# Patient Record
Sex: FEMALE | Race: WHITE | Employment: OTHER | URBAN - METROPOLITAN AREA
[De-identification: names, ages, dates, MRNs, and addresses within clinical notes are randomized per-mention and may not be internally consistent; named-entity substitution may affect disease eponyms.]

---

## 2019-06-05 ENCOUNTER — LAB VISIT (OUTPATIENT)
Dept: LAB | Facility: HOSPITAL | Age: 29
End: 2019-06-05
Attending: OBSTETRICS & GYNECOLOGY
Payer: MEDICARE

## 2019-06-05 ENCOUNTER — OFFICE VISIT (OUTPATIENT)
Dept: OBSTETRICS AND GYNECOLOGY | Facility: CLINIC | Age: 29
End: 2019-06-05
Payer: MEDICARE

## 2019-06-05 VITALS — HEIGHT: 63 IN | WEIGHT: 175.06 LBS | BODY MASS INDEX: 31.02 KG/M2

## 2019-06-05 DIAGNOSIS — N91.4 SECONDARY OLIGOMENORRHEA: Primary | ICD-10-CM

## 2019-06-05 DIAGNOSIS — N91.4 SECONDARY OLIGOMENORRHEA: ICD-10-CM

## 2019-06-05 DIAGNOSIS — N89.8 VAGINAL DISCHARGE: ICD-10-CM

## 2019-06-05 LAB
B-HCG UR QL: NEGATIVE
CTP QC/QA: YES
TSH SERPL DL<=0.005 MIU/L-ACNC: 1.94 UIU/ML (ref 0.4–4)

## 2019-06-05 PROCEDURE — 84146 ASSAY OF PROLACTIN: CPT

## 2019-06-05 PROCEDURE — 88175 CYTOPATH C/V AUTO FLUID REDO: CPT

## 2019-06-05 PROCEDURE — 36415 COLL VENOUS BLD VENIPUNCTURE: CPT

## 2019-06-05 PROCEDURE — 84443 ASSAY THYROID STIM HORMONE: CPT

## 2019-06-05 PROCEDURE — 83001 ASSAY OF GONADOTROPIN (FSH): CPT

## 2019-06-05 PROCEDURE — 99213 OFFICE O/P EST LOW 20 MIN: CPT | Mod: PBBFAC | Performed by: OBSTETRICS & GYNECOLOGY

## 2019-06-05 PROCEDURE — 87624 HPV HI-RISK TYP POOLED RSLT: CPT

## 2019-06-05 PROCEDURE — 99205 OFFICE O/P NEW HI 60 MIN: CPT | Mod: S$PBB,,, | Performed by: OBSTETRICS & GYNECOLOGY

## 2019-06-05 PROCEDURE — 99999 PR PBB SHADOW E&M-EST. PATIENT-LVL III: CPT | Mod: PBBFAC,,, | Performed by: OBSTETRICS & GYNECOLOGY

## 2019-06-05 PROCEDURE — 99999 PR PBB SHADOW E&M-EST. PATIENT-LVL III: ICD-10-PCS | Mod: PBBFAC,,, | Performed by: OBSTETRICS & GYNECOLOGY

## 2019-06-05 PROCEDURE — 99205 PR OFFICE/OUTPT VISIT, NEW, LEVL V, 60-74 MIN: ICD-10-PCS | Mod: S$PBB,,, | Performed by: OBSTETRICS & GYNECOLOGY

## 2019-06-05 PROCEDURE — 87660 TRICHOMONAS VAGIN DIR PROBE: CPT

## 2019-06-05 PROCEDURE — 81025 URINE PREGNANCY TEST: CPT | Mod: PBBFAC | Performed by: OBSTETRICS & GYNECOLOGY

## 2019-06-05 RX ORDER — TOPIRAMATE 200 MG/1
200 TABLET ORAL NIGHTLY
Refills: 5 | COMMUNITY
Start: 2019-03-23 | End: 2022-07-11 | Stop reason: SDUPTHER

## 2019-06-05 RX ORDER — OMEPRAZOLE 40 MG/1
40 CAPSULE, DELAYED RELEASE ORAL DAILY
Refills: 5 | COMMUNITY
Start: 2019-03-09 | End: 2021-01-21

## 2019-06-05 RX ORDER — ARIPIPRAZOLE 5 MG/1
5 TABLET ORAL
COMMUNITY
End: 2021-01-21

## 2019-06-05 RX ORDER — BUPROPION HYDROCHLORIDE 150 MG/1
TABLET, EXTENDED RELEASE ORAL
Refills: 0 | COMMUNITY
Start: 2019-03-25

## 2019-06-05 NOTE — PROGRESS NOTES
"History & Physical  Gynecology      SUBJECTIVE:     Chief Complaint: Amenorrhea       History of Present Illness:  Ms. Akins is a 28 y/o female who presents to clinic to discuss menstrual ireegularirty. She reports that she has not had a period since February.    Periods lasts 4-5 days and are monthly but irregularly. She reports that her periods were very all over the place. She reports that although her periods were monthly they were very all over the place. She could not predict when her periods were going to come. She reports that her cycles were every 28 days up until she was started on an OCP last year which she stopped because she ran out mid . She reports that she ran out because she could not see the gynecologist as he was in [facility]. The OCPs allowed her to have a cycle every 3 months and the flow was not as heavy.       Since then her period has been irregular. In February, her period stopped. She reports that she did not start a new medication. 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 No pertinent surgical history.  OB History        0    Para   0    Term   0       0    AB   0    Living   0       SAB   0    TAB   0    Ectopic   0    Multiple   0    Live Births   0               Family History   Problem Relation Age of Onset    Mental illness Mother      Social History     Tobacco Use    Smoking status: Never Smoker   Substance Use Topics    Alcohol use: Yes     Frequency: Monthly or less     Comment: wine every now and then     Drug use: No       Current Outpatient Medications   Medication Sig    " ARIPiprazole (ABILIFY) 5 MG Tab Take 5 mg by mouth.    buPROPion (WELLBUTRIN SR) 150 MG TBSR 12 hr tablet TAKE 2 TABLETS BY MOUTH EVERY MORNING AND TAKE 1 TABLET EVERY EVENING    omeprazole (PRILOSEC) 40 MG capsule Take 40 mg by mouth once daily.    oxcarbazepine (TRILEPTAL) 300 MG Tab Take 150 mg by mouth 2 (two) times daily.    topiramate (TOPAMAX) 200 MG Tab Take 200 mg by mouth every evening.    venlafaxine (EFFEXOR-XR) 75 MG 24 hr capsule Take 75 mg by mouth once daily.     No current facility-administered medications for this visit.      Review of Systems:  Review of Systems   Constitutional: Negative for chills and fever.   Respiratory: Negative for cough and wheezing.    Cardiovascular: Negative for chest pain and palpitations.   Gastrointestinal: Negative for abdominal pain, nausea and vomiting.   Genitourinary: Positive for menstrual problem. Negative for dysuria, frequency, hematuria, pelvic pain, vaginal bleeding, vaginal discharge and vaginal pain.   Psychiatric/Behavioral: The patient is nervous/anxious.         OBJECTIVE:     Physical Exam:  Physical Exam   Constitutional: She is oriented to person, place, and time. She appears well-developed and well-nourished. No distress.   Cardiovascular: Normal rate.   Pulmonary/Chest: Effort normal.   Genitourinary: Vaginal discharge found.   Genitourinary Comments: Unable to tolerate bimanual exam, thick white vaginal discharge in vault   Neurological: She is alert and oriented to person, place, and time.   Skin: Skin is warm and dry.   Nursing note and vitals reviewed.      ASSESSMENT:       ICD-10-CM ICD-9-CM    1. Secondary oligomenorrhea N91.4 626.1 TSH      POCT urine pregnancy      Prolactin      Follicle stimulating hormone      HPV High Risk Genotypes, PCR      Liquid-based pap smear, screening   2. Vaginal discharge N89.8 623.5 Vaginosis Screen by DNA Probe        Plan:      Patsy Camargo was seen today for amenorrhea.    Diagnoses and all orders for  this visit:    Secondary oligomenorrhea  -     TSH; Future  -     POCT urine pregnancy  -     Prolactin; Future  -     Follicle stimulating hormone; Future  -     HPV High Risk Genotypes, PCR  -     Liquid-based pap smear, screening  - Discussed etiologies of menstrual irregularities. Medications, weight changes, and stress could be the cause of recent cycle changes. Discussed with patient of restarting OCPs to regulate periods if labs are normal.     Vaginal discharge  -     Vaginosis Screen by DNA Probe        Orders Placed This Encounter   Procedures    Vaginosis Screen by DNA Probe    HPV High Risk Genotypes, PCR    TSH    Prolactin    Follicle stimulating hormone    POCT urine pregnancy       Follow up in about 1 month (around 7/3/2019) for WWE.    Counseling time: 30 minutes    Olaf Mckeon

## 2019-06-05 NOTE — PATIENT INSTRUCTIONS
Understanding the Normal Menstrual Cycle  Having a period (menstruation) is a normal, healthy part of being a woman. Its also part of the menstrual cycle, a process that makes it possible for women to become pregnant. The first day of your period is the first day of your menstrual cycle.   A woman who has irregular cycles can become pregnant during bleeding. This may not be a true menstrual period.   An egg is released    Eggs are female reproductive cells stored in the ovaries. During each cycle, a woman's hormones trigger an egg, (usually 1), to mature and be released from an ovary. This is called ovulation. The egg then travels from the ovary to a fallopian tube.  The egg travels through a tube  The egg moves through the fallopian tube toward the uterus. If sperm are present in the tube, the egg may be fertilized, and pregnancy could result.  The uterine lining grows thicker  The lining of the uterus is made up of blood, tissue, and fluid. During each cycle, hormones cause the lining to thicken. This helps prepare the uterus to receive and nourish a fertilized egg.   The egg and lining are shed  If pregnancy doesnt happen, the egg and thickened lining of the uterus are no longer needed. They are then shed through the vagina. This is called a menstrual period.  How long is each cycle?  It is normal for a cycle to take 21 to 34 days. For teenagers, the time between periods might be as much as 45 days. For adults, it will be around a month from the first day of one period to the first day of the next. Thats why you may hear women talk about a monthly cycle, even though cycle length can vary from one month to another, and anywhere from 3 to 5 weeks is normal. Not everyone has a 28-day cycle.    How long does a period last?  Its normal for a period to last 2 to 7 days. Talk to your healthcare provider if your period lasts longer than 7 days for 2 cycles in a row.  Date Last Reviewed: 12/1/2016  © 2260-5174 [employer]. [address] [place] [address], [place] [address]. All rights reserved. This information is not intended as a substitute for professional medical care. Always follow your healthcare professional's instructions.

## 2019-06-06 DIAGNOSIS — B37.31 YEAST VAGINITIS: Primary | ICD-10-CM

## 2019-06-06 LAB
BACTERIAL VAGINOSIS DNA: NEGATIVE
CANDIDA GLABRATA DNA: NEGATIVE
CANDIDA KRUSEI DNA: NEGATIVE
CANDIDA RRNA VAG QL PROBE: POSITIVE
FSH SERPL-ACNC: 5.2 MIU/ML
PROLACTIN SERPL IA-MCNC: 17.6 NG/ML (ref 5.2–26.5)
T VAGINALIS RRNA GENITAL QL PROBE: NEGATIVE

## 2019-06-06 RX ORDER — FLUCONAZOLE 150 MG/1
150 TABLET ORAL DAILY
Qty: 1 TABLET | Refills: 0 | Status: SHIPPED | OUTPATIENT
Start: 2019-06-06 | End: 2019-06-07

## 2019-06-07 ENCOUNTER — TELEPHONE (OUTPATIENT)
Dept: OBSTETRICS AND GYNECOLOGY | Facility: CLINIC | Age: 29
End: 2019-06-07

## 2019-06-07 NOTE — TELEPHONE ENCOUNTER
Patient informed.  Wants the birth control.  Message forwarded.     ----- Message from Olaf Mckeon MD sent at 6/6/2019  5:06 PM CDT -----  Please inform patient that all of her blood work is normal so if she would like to start birth control to regulate her period just please let me know and I will send to her pharmacy.    Dr. Marshall

## 2019-06-07 NOTE — TELEPHONE ENCOUNTER
----- Message from Olaf Mckeon MD sent at 6/6/2019  5:06 PM CDT -----  Please inform patient that all of her blood work is normal so if she would like to start birth control to regulate her period just please let me know and I will send to her pharmacy.    Dr. Marshall

## 2019-06-07 NOTE — TELEPHONE ENCOUNTER
Call attempt to pt unsuccessful regarding normal test reults. LM for pt to return call to office.

## 2019-06-11 LAB
HPV HR 12 DNA CVX QL NAA+PROBE: NEGATIVE
HPV16 AG SPEC QL: NEGATIVE
HPV18 DNA SPEC QL NAA+PROBE: NEGATIVE

## 2019-06-12 DIAGNOSIS — N92.6 MENSTRUAL IRREGULARITY: Primary | ICD-10-CM

## 2019-06-13 ENCOUNTER — TELEPHONE (OUTPATIENT)
Dept: OBSTETRICS AND GYNECOLOGY | Facility: CLINIC | Age: 29
End: 2019-06-13

## 2019-06-13 RX ORDER — NORETHINDRONE ACETATE AND ETHINYL ESTRADIOL .02; 1 MG/1; MG/1
1 TABLET ORAL DAILY
Qty: 90 TABLET | Refills: 3 | Status: SHIPPED | OUTPATIENT
Start: 2019-06-13 | End: 2021-01-21

## 2019-06-13 NOTE — TELEPHONE ENCOUNTER
----- Message from Olaf Mckeon MD sent at 6/13/2019 12:07 AM CDT -----  Please inform patient that ocps have been sent.

## 2019-06-21 ENCOUNTER — TELEPHONE (OUTPATIENT)
Dept: OBSTETRICS AND GYNECOLOGY | Facility: CLINIC | Age: 29
End: 2019-06-21

## 2019-06-21 NOTE — TELEPHONE ENCOUNTER
Called pt per  request wanted to know if pt picked up RX for B/c and if it is helping with the bleeding Lm asking pt to call office

## 2019-08-05 ENCOUNTER — TELEPHONE (OUTPATIENT)
Dept: PSYCHIATRY | Facility: CLINIC | Age: 29
End: 2019-08-05

## 2019-08-05 NOTE — TELEPHONE ENCOUNTER
Patient called to schedule new patient appointment which we are not able to do at this time as she does not have [facility] or establised with any [facility] providers.

## 2019-11-15 ENCOUNTER — HOSPITAL ENCOUNTER (EMERGENCY)
Facility: OTHER | Age: 29
Discharge: PSYCHIATRIC OR SUBSTANCE USE FACILITY | End: 2019-11-15
Attending: EMERGENCY MEDICINE
Payer: MEDICARE

## 2019-11-15 VITALS
WEIGHT: 160 LBS | RESPIRATION RATE: 14 BRPM | BODY MASS INDEX: 28.35 KG/M2 | OXYGEN SATURATION: 99 % | HEIGHT: 63 IN | DIASTOLIC BLOOD PRESSURE: 87 MMHG | TEMPERATURE: 99 F | SYSTOLIC BLOOD PRESSURE: 119 MMHG | HEART RATE: 109 BPM

## 2019-11-15 DIAGNOSIS — R45.851 SUICIDAL IDEATIONS: Primary | ICD-10-CM

## 2019-11-15 DIAGNOSIS — R00.0 TACHYCARDIA: ICD-10-CM

## 2019-11-15 DIAGNOSIS — F31.9 BIPOLAR AFFECTIVE DISORDER, REMISSION STATUS UNSPECIFIED: ICD-10-CM

## 2019-11-15 LAB
ALBUMIN SERPL BCP-MCNC: 3.9 G/DL (ref 3.5–5.2)
ALP SERPL-CCNC: 59 U/L (ref 55–135)
ALT SERPL W/O P-5'-P-CCNC: 34 U/L (ref 10–44)
AMPHET+METHAMPHET UR QL: NEGATIVE
ANION GAP SERPL CALC-SCNC: 13 MMOL/L (ref 8–16)
APAP SERPL-MCNC: <3 UG/ML (ref 10–20)
AST SERPL-CCNC: 20 U/L (ref 10–40)
B-HCG UR QL: NEGATIVE
BARBITURATES UR QL SCN>200 NG/ML: NEGATIVE
BASOPHILS # BLD AUTO: 0.04 K/UL (ref 0–0.2)
BASOPHILS NFR BLD: 0.7 % (ref 0–1.9)
BENZODIAZ UR QL SCN>200 NG/ML: NEGATIVE
BILIRUB SERPL-MCNC: 0.2 MG/DL (ref 0.1–1)
BILIRUB UR QL STRIP: NEGATIVE
BUN SERPL-MCNC: 6 MG/DL (ref 6–20)
BZE UR QL SCN: NEGATIVE
CALCIUM SERPL-MCNC: 9.2 MG/DL (ref 8.7–10.5)
CANNABINOIDS UR QL SCN: NEGATIVE
CHLORIDE SERPL-SCNC: 107 MMOL/L (ref 95–110)
CLARITY UR: ABNORMAL
CO2 SERPL-SCNC: 17 MMOL/L (ref 23–29)
COLOR UR: YELLOW
CREAT SERPL-MCNC: 0.9 MG/DL (ref 0.5–1.4)
CREAT UR-MCNC: 124.8 MG/DL (ref 15–325)
CTP QC/QA: YES
DIFFERENTIAL METHOD: ABNORMAL
EOSINOPHIL # BLD AUTO: 0.1 K/UL (ref 0–0.5)
EOSINOPHIL NFR BLD: 1.8 % (ref 0–8)
ERYTHROCYTE [DISTWIDTH] IN BLOOD BY AUTOMATED COUNT: 15.9 % (ref 11.5–14.5)
EST. GFR  (AFRICAN AMERICAN): >60 ML/MIN/1.73 M^2
EST. GFR  (NON AFRICAN AMERICAN): >60 ML/MIN/1.73 M^2
ETHANOL SERPL-MCNC: <10 MG/DL
GLUCOSE SERPL-MCNC: 91 MG/DL (ref 70–110)
GLUCOSE UR QL STRIP: NEGATIVE
HCT VFR BLD AUTO: 39.3 % (ref 37–48.5)
HGB BLD-MCNC: 12.8 G/DL (ref 12–16)
HGB UR QL STRIP: NEGATIVE
IMM GRANULOCYTES # BLD AUTO: 0.02 K/UL (ref 0–0.04)
IMM GRANULOCYTES NFR BLD AUTO: 0.4 % (ref 0–0.5)
KETONES UR QL STRIP: NEGATIVE
LEUKOCYTE ESTERASE UR QL STRIP: NEGATIVE
LYMPHOCYTES # BLD AUTO: 1.3 K/UL (ref 1–4.8)
LYMPHOCYTES NFR BLD: 23.7 % (ref 18–48)
MCH RBC QN AUTO: 27.4 PG (ref 27–31)
MCHC RBC AUTO-ENTMCNC: 32.6 G/DL (ref 32–36)
MCV RBC AUTO: 84 FL (ref 82–98)
METHADONE UR QL SCN>300 NG/ML: NEGATIVE
MONOCYTES # BLD AUTO: 0.6 K/UL (ref 0.3–1)
MONOCYTES NFR BLD: 11.1 % (ref 4–15)
NEUTROPHILS # BLD AUTO: 3.5 K/UL (ref 1.8–7.7)
NEUTROPHILS NFR BLD: 62.3 % (ref 38–73)
NITRITE UR QL STRIP: NEGATIVE
NRBC BLD-RTO: 0 /100 WBC
OPIATES UR QL SCN: NEGATIVE
PCP UR QL SCN>25 NG/ML: NEGATIVE
PH UR STRIP: 8 [PH] (ref 5–8)
PLATELET # BLD AUTO: 249 K/UL (ref 150–350)
PMV BLD AUTO: 8.1 FL (ref 9.2–12.9)
POTASSIUM SERPL-SCNC: 3.3 MMOL/L (ref 3.5–5.1)
PROT SERPL-MCNC: 7.1 G/DL (ref 6–8.4)
PROT UR QL STRIP: NEGATIVE
RBC # BLD AUTO: 4.68 M/UL (ref 4–5.4)
SODIUM SERPL-SCNC: 137 MMOL/L (ref 136–145)
SP GR UR STRIP: 1.01 (ref 1–1.03)
TOXICOLOGY INFORMATION: NORMAL
TSH SERPL DL<=0.005 MIU/L-ACNC: 1.66 UIU/ML (ref 0.4–4)
URN SPEC COLLECT METH UR: ABNORMAL
UROBILINOGEN UR STRIP-ACNC: 1 EU/DL
WBC # BLD AUTO: 5.61 K/UL (ref 3.9–12.7)

## 2019-11-15 PROCEDURE — 80329 ANALGESICS NON-OPIOID 1 OR 2: CPT

## 2019-11-15 PROCEDURE — 85025 COMPLETE CBC W/AUTO DIFF WBC: CPT

## 2019-11-15 PROCEDURE — 93005 ELECTROCARDIOGRAM TRACING: CPT

## 2019-11-15 PROCEDURE — 84443 ASSAY THYROID STIM HORMONE: CPT

## 2019-11-15 PROCEDURE — 80307 DRUG TEST PRSMV CHEM ANLYZR: CPT

## 2019-11-15 PROCEDURE — 93010 ELECTROCARDIOGRAM REPORT: CPT | Mod: ,,, | Performed by: INTERNAL MEDICINE

## 2019-11-15 PROCEDURE — 93010 EKG 12-LEAD: ICD-10-PCS | Mod: ,,, | Performed by: INTERNAL MEDICINE

## 2019-11-15 PROCEDURE — 81003 URINALYSIS AUTO W/O SCOPE: CPT | Mod: 59

## 2019-11-15 PROCEDURE — 80053 COMPREHEN METABOLIC PANEL: CPT

## 2019-11-15 PROCEDURE — 99285 EMERGENCY DEPT VISIT HI MDM: CPT | Mod: 25

## 2019-11-15 PROCEDURE — 81025 URINE PREGNANCY TEST: CPT | Performed by: EMERGENCY MEDICINE

## 2019-11-15 PROCEDURE — 80320 DRUG SCREEN QUANTALCOHOLS: CPT

## 2019-11-15 PROCEDURE — 25000003 PHARM REV CODE 250: Performed by: EMERGENCY MEDICINE

## 2019-11-15 RX ORDER — LORAZEPAM 1 MG/1
1 TABLET ORAL
Status: COMPLETED | OUTPATIENT
Start: 2019-11-15 | End: 2019-11-15

## 2019-11-15 RX ORDER — PANTOPRAZOLE SODIUM 40 MG/1
40 TABLET, DELAYED RELEASE ORAL
Status: COMPLETED | OUTPATIENT
Start: 2019-11-15 | End: 2019-11-15

## 2019-11-15 RX ORDER — BUPROPION HYDROCHLORIDE 150 MG/1
150 TABLET, EXTENDED RELEASE ORAL
Status: COMPLETED | OUTPATIENT
Start: 2019-11-15 | End: 2019-11-15

## 2019-11-15 RX ADMIN — PANTOPRAZOLE SODIUM 40 MG: 40 TABLET, DELAYED RELEASE ORAL at 02:11

## 2019-11-15 RX ADMIN — LORAZEPAM 1 MG: 1 TABLET ORAL at 12:11

## 2019-11-15 RX ADMIN — BUPROPION HYDROCHLORIDE 150 MG: 150 TABLET, EXTENDED RELEASE ORAL at 03:11

## 2019-11-15 NOTE — ED NOTES
Pt remains in policy paper scrubs, resting in stretcher comfortably. Pt remains calm and cooperative with staff, aaox4, speaking approprietly in clear full sentences at this time. Respirations remain spontaneous, even and non labored. Toileting needs addressed at this time, pt states will call nurse for assistance. Patient denies pains or needs at this time. No signs of distress noted at this time. NELSON Yael remains at bedside in direct visual contact, charting per protocol every 15 minutes. No equipment or belongings are in the patients room. Pt aware of plan of current care. Will continue to monitor closely.

## 2019-11-15 NOTE — ED NOTES
Medications:  [id number] (20tablets)  Nasal spray 50mcg  Omeprazole 40mg (1)  latuda 20mg (30)  topiramate 200mg (54)  Oxcarbazepine 600mg(75)  Oxcarbazepine 600mg(5)  latuda 20mg(2)  Oxcarbazepine 600mg (98)  Estradiol tablets(1)  Estradiol(9)  Bupropion HCL(45)  Lorazepam(113)    Pharmacy ticket number [id number]

## 2019-11-15 NOTE — ED PROVIDER NOTES
"Encounter Date: 11/15/2019    SCRIBE #1 NOTE: I, Elizabeth Vangmayehu, am scribing for, and in the presence of, Dr. Qureshi.       History     Chief Complaint   Patient presents with    Suicidal     Pt reports being suicidal and thinking about different ways to do it such as slitting my wrist or driving my car off of a bridge. Pt denies AH or VH, denies HI     Time seen by provider: 11:44 AM    This is a 29 y.o. female with hx of bipolar disorder with depression who presents due to SI today. She states that she has been thinking of hurting herself for "a while now." She reports that she has been feeling depressed, states that she hasn't wanted to eat and is sleeping more. She states that she is on medications for depression and bipolar disorder, but "they're not helping." Pt reports taking her medications this morning. Pt does see a psychiatrist who she states suggested she go to a hospital. She is requesting to leave now, stating "it's my body, it's my choice."     The history is provided by the patient.     Review of patient's allergies indicates:   Allergen Reactions    Lamictal [lamotrigine]      Past Medical History:   Diagnosis Date    Bipolar affective     Bipolar disorder with depression     Anxiety disorder    Irritable bowel syndrome      No past surgical history on file.  Family History   Problem Relation Age of Onset    Mental illness Mother      Social History     Tobacco Use    Smoking status: Never Smoker   Substance Use Topics    Alcohol use: Yes     Frequency: Monthly or less     Comment: wine every now and then     Drug use: No     Review of Systems   Constitutional: Negative for chills and fever.   HENT: Negative for congestion, rhinorrhea and sore throat.    Respiratory: Negative for cough and shortness of breath.    Cardiovascular: Negative for chest pain.   Gastrointestinal: Negative for abdominal pain, diarrhea, nausea and vomiting.   Endocrine: Negative for polyuria.   Genitourinary: " Negative for decreased urine volume and dysuria.   Musculoskeletal: Negative for back pain.   Skin: Negative for rash.   Allergic/Immunologic: Negative for immunocompromised state.   Neurological: Negative for dizziness and weakness.   Hematological: Does not bruise/bleed easily.   Psychiatric/Behavioral: Positive for suicidal ideas. Negative for confusion and hallucinations.        Positive for feeling depressed.       Physical Exam     Initial Vitals [11/15/19 1114]   BP Pulse Resp Temp SpO2   128/87 (!) 136 18 98.8 °F (37.1 °C) 99 %      MAP       --         Physical Exam    Nursing note and vitals reviewed.  Constitutional: She appears well-developed and well-nourished. No distress.   Tearful.   HENT:   Head: Normocephalic and atraumatic.   Right Ear: External ear normal.   Left Ear: External ear normal.   Eyes: EOM are normal. Pupils are equal, round, and reactive to light. Right eye exhibits no discharge. Left eye exhibits no discharge.   Neck: Normal range of motion. Neck supple.   Cardiovascular: Regular rhythm, normal heart sounds and intact distal pulses. Tachycardia present.    Pulmonary/Chest: Breath sounds normal. No respiratory distress.   Abdominal: Soft. Bowel sounds are normal. She exhibits no distension. There is no tenderness.   Musculoskeletal: Normal range of motion. She exhibits no edema.   Neurological: She is alert and oriented to person, place, and time. She has normal strength. No cranial nerve deficit.   Skin: Skin is warm and dry. No rash noted. No erythema.         ED Course   Procedures  Labs Reviewed   CBC W/ AUTO DIFFERENTIAL - Abnormal; Notable for the following components:       Result Value    RDW 15.9 (*)     MPV 8.1 (*)     All other components within normal limits   COMPREHENSIVE METABOLIC PANEL - Abnormal; Notable for the following components:    Potassium 3.3 (*)     CO2 17 (*)     All other components within normal limits   URINALYSIS, REFLEX TO URINE CULTURE - Abnormal;  Notable for the following components:    Appearance, UA Hazy (*)     All other components within normal limits    Narrative:     Preferred Collection Type->Urine, Clean Catch   ACETAMINOPHEN LEVEL - Abnormal; Notable for the following components:    Acetaminophen (Tylenol), Serum <3.0 (*)     All other components within normal limits   TSH   DRUG SCREEN PANEL, URINE EMERGENCY    Narrative:     Preferred Collection Type->Urine, Clean Catch   ALCOHOL,MEDICAL (ETHANOL)   POCT URINE PREGNANCY     EKG Readings: (Independently Interpreted)   Rhythm: Sinus Tachycardia. Heart Rate: 113.   No arrhythmia. No acute ischemia.         Medical Decision Making:   History:   Old Medical Records: I decided to obtain old medical records.  Initial Assessment:       29-year-old female history of bipolar, depression presents with SI.  Prior to my evaluation, patient became tearful and agitated and requested to leave and required security to direct her back to her room.  I was able to calm her down and she provided limited history, stating that she has had worsening depression despite compliance with her medications, and SI.  She has required psychiatric admission in the past but not in years.  She does follow with a psychiatrist who directed her to the ER for admission.  Patient denies any acute physical complaints, and does not appear intoxicated.  She is now calm and cooperative; she takes Ativan p.r.n. Anxiety, so will give 1 dose now.    Basic labs checked with no acute findings except for mild hypokalemia; no elevated alcohol and no drugs on Utox.   Patient remained cooperative ED with no further episodes of agitation.   She is medically clear for psychiatric evaluation and admission.       Independently Interpreted Test(s):   I have ordered and independently interpreted EKG Reading(s) - see prior notes  Clinical Tests:   Lab Tests: Ordered and Reviewed  Medical Tests: Ordered and Reviewed            Scribe Attestation:   Scribe  #1: I performed the above scribed service and the documentation accurately describes the services I performed. I attest to the accuracy of the note.    Attending Attestation:           Physician Attestation for Scribe:  Physician Attestation Statement for Scribe #1: I, Dr. Quershi, reviewed documentation, as scribed by Elizabeth Tony in my presence, and it is both accurate and complete.                               Clinical Impression:     1. Suicidal ideations    2. Tachycardia    3. Bipolar affective disorder, remission status unspecified                              Ramone Qureshi MD  11/15/19 1649

## 2019-11-15 NOTE — ED NOTES
Pt given ice water and new pair of paper scrub pants. Tom (Risk Sitter) at bedside maintaining direct observation.

## 2019-11-15 NOTE — ED NOTES
Pt explained transfer process. Pt v/u     Pt signed transfer form and pt rights form. Copy given to pt

## 2019-11-15 NOTE — ED NOTES
Pt moved to ED bed 9. All harmful objects removed from room. Pt undressed and changed into paper scrubs per unit policy. All pt belongings and valuables brought to nursing station and given to security. ED sitter Yael at bedside for direct observation. PEC precautions maintained. Will continue to monitor.

## 2019-11-15 NOTE — ED NOTES
Pt escorted to room 2 with triage nurse. ED sitter chris at bedside for direct observation until further evaluation by provider. PEC precautions initiated until further evaluation by provider

## 2019-11-15 NOTE — ED NOTES
"Two patient Identifiers for Patsy Akins checked and correct    Pt presents to the ED for suicidal thoughts. Pt states she keeps having repetative thoughts of need to escape her life and ways to kill herself such as "slit her wrists" or "jump in front of a car". Pt denies previous attempts, intention to act on thoughts or working out the details of a suicide attempt. Pt admits to previous self harm. Pt states her home life such as dying grandmother, unsupportive aunt, and dog barking at her is triggering her stress. Pt states feeling trapped. Pt states she is seeing psych healthcare practitioner regularly, and states she is taking her meds as ordered. Pt agrees to treatment and states familiar the process.     LOC/Affect: Pt A&Ox4. Pt affect is anxious and tearful.   Appearance: Pt in no acute distress at present time. Pt is clean and well groomed.  Skin: Skin warm, dry & intact. Color consistent with ethnicity. Mucous membranes moist. No breakdown or brusing noted.   Musculoskeletal: Patient ROM intact MAEW, no obvious swelling or deformities noted.   Respiratory: Respirations spontaneous, even, and non-labored. Visible chest rise noted. Airway is open and patent. No accessory muscle use noted. Breath sounds CTA  Cardiac: All peripheral pulses present. No Bilateral lower extremity edema. Cap refill is <3 seconds. ST on cardiac monitoring.  Neurologic: Pupils 3mm PERRL. Motor and sensation intact. Speech is clear and appropriate. Eyes open spontaneously, follows commands, facial expression symmetrical.  Abdomen: Abdomen soft, non-tender to palpation. No distention noted. ABS all quads.   : Pt reports no dysuria or hematuria.     Will continue to monitor  "

## 2019-11-15 NOTE — ED NOTES
Pt became very upset, stating she wants to leave. Pt began yelling and crying, then attempted to walk out the room. Dr. Reynolds at bedside along with risk sitter, RNx2, and security outside doorway. Dr. Reynolds able to calm patient and redirect her back into stretcher. Pt continues to cry, but cooperative with MD and agreeing with POC. Urine collected.

## 2019-11-15 NOTE — ED NOTES
Belongings:  Stuffed animal  Several notebooks  Pencil pouch    Coconut oil  Hairbrush  Makeup pouch  Sweater  Earphones  Black purse  Scissors  Sunglasses  Fidget Clicker  Ear plugs  Bra  Pants   shoes    Valuables:  Wallet: ID, debit cards, gift cards,library card.  Keys  Phone    Valuable ticket number [id number]

## 2019-11-15 NOTE — ED NOTES
Pt lying on stretcher calm but slightly tearful. Cooperative with blood draw. Requesting Ativan and states she will try to eat something. MD notified and new medication order received. terry Conley, remains at bedside maintaining direct visual observation.

## 2019-11-16 PROBLEM — I10 ESSENTIAL HYPERTENSION: Status: ACTIVE | Noted: 2019-11-16

## 2019-11-16 PROBLEM — E87.6 HYPOKALEMIA: Status: ACTIVE | Noted: 2019-11-16

## 2019-11-16 PROBLEM — K21.9 GASTROESOPHAGEAL REFLUX DISEASE WITHOUT ESOPHAGITIS: Status: ACTIVE | Noted: 2019-11-16

## 2019-11-16 PROBLEM — K58.8 OTHER IRRITABLE BOWEL SYNDROME: Status: ACTIVE | Noted: 2019-11-16

## 2020-07-01 ENCOUNTER — HOSPITAL ENCOUNTER (EMERGENCY)
Facility: HOSPITAL | Age: 30
Discharge: HOME | End: 2020-07-01
Attending: EMERGENCY MEDICINE
Payer: MEDICARE

## 2020-07-01 VITALS
RESPIRATION RATE: 18 BRPM | HEIGHT: 63 IN | TEMPERATURE: 99 F | OXYGEN SATURATION: 98 % | WEIGHT: 125 LBS | DIASTOLIC BLOOD PRESSURE: 70 MMHG | BODY MASS INDEX: 22.15 KG/M2 | SYSTOLIC BLOOD PRESSURE: 110 MMHG | HEART RATE: 120 BPM

## 2020-07-01 DIAGNOSIS — F41.9 ANXIETY: Primary | ICD-10-CM

## 2020-07-01 DIAGNOSIS — F43.10 POST TRAUMATIC STRESS DISORDER (PTSD): ICD-10-CM

## 2020-07-01 DIAGNOSIS — F31.9 BIPOLAR 1 DISORDER: ICD-10-CM

## 2020-07-01 PROCEDURE — 25000003 PHARM REV CODE 250: Performed by: EMERGENCY MEDICINE

## 2020-07-01 PROCEDURE — 99283 EMERGENCY DEPT VISIT LOW MDM: CPT

## 2020-07-01 RX ORDER — VORTIOXETINE 10 MG/1
10 TABLET, FILM COATED ORAL
COMMUNITY

## 2020-07-01 RX ORDER — LORAZEPAM 0.5 MG/1
0.5 TABLET ORAL
Status: COMPLETED | OUTPATIENT
Start: 2020-07-01 | End: 2020-07-01

## 2020-07-01 RX ORDER — LORAZEPAM 0.5 MG/1
1 TABLET ORAL
Status: DISCONTINUED | OUTPATIENT
Start: 2020-07-01 | End: 2020-07-01

## 2020-07-01 RX ADMIN — LORAZEPAM 0.5 MG: 0.5 TABLET ORAL at 06:07

## 2020-07-01 NOTE — DISCHARGE INSTRUCTIONS
Please follow-up with your psychiatrist, Dr. Mcgill, as soon as possible.  You may also follow up with the clinic above.  Continue your Ativan.  Return immediately if you get worse or if new problems develop.  Rest.
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
## 2020-07-02 NOTE — ED NOTES
Received report from DAFNE Garcia, pt resting comfortably, in no acute distress,pt denies SI or HI.  Bed locked and in lowest position and call light in reach.  Will cont to monitor.

## 2020-07-02 NOTE — ED NOTES
Pt ambulated well with strong steady gait, in no acute distress, denies SI or HI, verbalized understanding of d/c instructions.
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
## 2020-08-01 ENCOUNTER — HOSPITAL ENCOUNTER (EMERGENCY)
Facility: HOSPITAL | Age: 30
Discharge: PSYCHIATRIC OR SUBSTANCE USE FACILITY | End: 2020-08-02
Attending: EMERGENCY MEDICINE
Payer: MEDICARE

## 2020-08-01 VITALS
SYSTOLIC BLOOD PRESSURE: 105 MMHG | OXYGEN SATURATION: 99 % | RESPIRATION RATE: 16 BRPM | HEART RATE: 86 BPM | TEMPERATURE: 98 F | DIASTOLIC BLOOD PRESSURE: 71 MMHG

## 2020-08-01 DIAGNOSIS — E83.51 HYPOCALCEMIA: ICD-10-CM

## 2020-08-01 DIAGNOSIS — R00.0 TACHYCARDIA: ICD-10-CM

## 2020-08-01 DIAGNOSIS — R45.851 SUICIDAL IDEATION: Primary | ICD-10-CM

## 2020-08-01 LAB
ALBUMIN SERPL BCP-MCNC: 3.4 G/DL (ref 3.5–5.2)
ALP SERPL-CCNC: 49 U/L (ref 55–135)
ALT SERPL W/O P-5'-P-CCNC: 11 U/L (ref 10–44)
AMPHET+METHAMPHET UR QL: NEGATIVE
ANION GAP SERPL CALC-SCNC: 6 MMOL/L (ref 8–16)
APAP SERPL-MCNC: <3 UG/ML (ref 10–20)
AST SERPL-CCNC: 13 U/L (ref 10–40)
B-HCG UR QL: NEGATIVE
BARBITURATES UR QL SCN>200 NG/ML: NEGATIVE
BASOPHILS # BLD AUTO: 0.05 K/UL (ref 0–0.2)
BASOPHILS NFR BLD: 1 % (ref 0–1.9)
BENZODIAZ UR QL SCN>200 NG/ML: NEGATIVE
BILIRUB SERPL-MCNC: 0.2 MG/DL (ref 0.1–1)
BILIRUB UR QL STRIP: NEGATIVE
BUN SERPL-MCNC: 5 MG/DL (ref 6–20)
BZE UR QL SCN: NEGATIVE
CALCIUM SERPL-MCNC: 8.3 MG/DL (ref 8.7–10.5)
CANNABINOIDS UR QL SCN: NEGATIVE
CHLORIDE SERPL-SCNC: 107 MMOL/L (ref 95–110)
CLARITY UR REFRACT.AUTO: CLEAR
CO2 SERPL-SCNC: 25 MMOL/L (ref 23–29)
COLOR UR AUTO: ABNORMAL
CREAT SERPL-MCNC: 0.6 MG/DL (ref 0.5–1.4)
CREAT UR-MCNC: 249 MG/DL (ref 15–325)
CTP QC/QA: YES
DIFFERENTIAL METHOD: ABNORMAL
EOSINOPHIL # BLD AUTO: 0.1 K/UL (ref 0–0.5)
EOSINOPHIL NFR BLD: 2 % (ref 0–8)
ERYTHROCYTE [DISTWIDTH] IN BLOOD BY AUTOMATED COUNT: 13.8 % (ref 11.5–14.5)
EST. GFR  (AFRICAN AMERICAN): >60 ML/MIN/1.73 M^2
EST. GFR  (NON AFRICAN AMERICAN): >60 ML/MIN/1.73 M^2
ETHANOL SERPL-MCNC: <10 MG/DL
GLUCOSE SERPL-MCNC: 80 MG/DL (ref 70–110)
GLUCOSE UR QL STRIP: NEGATIVE
HCT VFR BLD AUTO: 36.4 % (ref 37–48.5)
HGB BLD-MCNC: 11.5 G/DL (ref 12–16)
HGB UR QL STRIP: NEGATIVE
IMM GRANULOCYTES # BLD AUTO: 0.01 K/UL (ref 0–0.04)
IMM GRANULOCYTES NFR BLD AUTO: 0.2 % (ref 0–0.5)
KETONES UR QL STRIP: NEGATIVE
LEUKOCYTE ESTERASE UR QL STRIP: ABNORMAL
LYMPHOCYTES # BLD AUTO: 1.8 K/UL (ref 1–4.8)
LYMPHOCYTES NFR BLD: 37 % (ref 18–48)
MCH RBC QN AUTO: 29.6 PG (ref 27–31)
MCHC RBC AUTO-ENTMCNC: 31.6 G/DL (ref 32–36)
MCV RBC AUTO: 94 FL (ref 82–98)
METHADONE UR QL SCN>300 NG/ML: NEGATIVE
MICROSCOPIC COMMENT: NORMAL
MONOCYTES # BLD AUTO: 0.5 K/UL (ref 0.3–1)
MONOCYTES NFR BLD: 9.1 % (ref 4–15)
NEUTROPHILS # BLD AUTO: 2.5 K/UL (ref 1.8–7.7)
NEUTROPHILS NFR BLD: 50.7 % (ref 38–73)
NITRITE UR QL STRIP: NEGATIVE
NRBC BLD-RTO: 0 /100 WBC
OPIATES UR QL SCN: NEGATIVE
PCP UR QL SCN>25 NG/ML: NEGATIVE
PH UR STRIP: 7 [PH] (ref 5–8)
PLATELET # BLD AUTO: 211 K/UL (ref 150–350)
PMV BLD AUTO: 8.3 FL (ref 9.2–12.9)
POTASSIUM SERPL-SCNC: 3.5 MMOL/L (ref 3.5–5.1)
PROT SERPL-MCNC: 6.2 G/DL (ref 6–8.4)
PROT UR QL STRIP: NEGATIVE
RBC # BLD AUTO: 3.88 M/UL (ref 4–5.4)
RBC #/AREA URNS AUTO: 1 /HPF (ref 0–4)
SARS-COV-2 RDRP RESP QL NAA+PROBE: NEGATIVE
SODIUM SERPL-SCNC: 138 MMOL/L (ref 136–145)
SP GR UR STRIP: 1.02 (ref 1–1.03)
SQUAMOUS #/AREA URNS AUTO: 3 /HPF
TOXICOLOGY INFORMATION: NORMAL
TSH SERPL DL<=0.005 MIU/L-ACNC: 1.35 UIU/ML (ref 0.4–4)
URN SPEC COLLECT METH UR: ABNORMAL
WBC # BLD AUTO: 4.95 K/UL (ref 3.9–12.7)
WBC #/AREA URNS AUTO: 1 /HPF (ref 0–5)

## 2020-08-01 PROCEDURE — 80329 ANALGESICS NON-OPIOID 1 OR 2: CPT

## 2020-08-01 PROCEDURE — 81025 URINE PREGNANCY TEST: CPT | Performed by: PHYSICIAN ASSISTANT

## 2020-08-01 PROCEDURE — 80307 DRUG TEST PRSMV CHEM ANLYZR: CPT

## 2020-08-01 PROCEDURE — 81001 URINALYSIS AUTO W/SCOPE: CPT

## 2020-08-01 PROCEDURE — 99285 PR EMERGENCY DEPT VISIT,LEVEL V: ICD-10-PCS | Mod: ,,, | Performed by: PHYSICIAN ASSISTANT

## 2020-08-01 PROCEDURE — U0002 COVID-19 LAB TEST NON-CDC: HCPCS

## 2020-08-01 PROCEDURE — 80320 DRUG SCREEN QUANTALCOHOLS: CPT

## 2020-08-01 PROCEDURE — 93010 EKG 12-LEAD: ICD-10-PCS | Mod: ,,, | Performed by: INTERNAL MEDICINE

## 2020-08-01 PROCEDURE — 93005 ELECTROCARDIOGRAM TRACING: CPT

## 2020-08-01 PROCEDURE — 93010 ELECTROCARDIOGRAM REPORT: CPT | Mod: ,,, | Performed by: INTERNAL MEDICINE

## 2020-08-01 PROCEDURE — 85025 COMPLETE CBC W/AUTO DIFF WBC: CPT

## 2020-08-01 PROCEDURE — 99285 EMERGENCY DEPT VISIT HI MDM: CPT | Mod: 25

## 2020-08-01 PROCEDURE — 84443 ASSAY THYROID STIM HORMONE: CPT

## 2020-08-01 PROCEDURE — 80053 COMPREHEN METABOLIC PANEL: CPT

## 2020-08-01 PROCEDURE — 99285 EMERGENCY DEPT VISIT HI MDM: CPT | Mod: ,,, | Performed by: PHYSICIAN ASSISTANT

## 2020-08-01 RX ORDER — CALCIUM CARBONATE 200(500)MG
200 TABLET,CHEWABLE ORAL
Status: DISCONTINUED | OUTPATIENT
Start: 2020-08-01 | End: 2020-08-02 | Stop reason: HOSPADM
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
## 2020-08-02 NOTE — ED NOTES
Patient transferred to [place] with [facility]. Belongings from [facility] given to [facility]. Sent with paperwork and PEC

## 2020-08-02 NOTE — ED NOTES
Bed: [facility]  Expected date:   Expected time:   Means of arrival:   Comments:
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
## 2021-01-21 ENCOUNTER — TELEPHONE (OUTPATIENT)
Dept: INTERNAL MEDICINE | Facility: CLINIC | Age: 31
End: 2021-01-21

## 2021-01-21 ENCOUNTER — LAB VISIT (OUTPATIENT)
Dept: LAB | Facility: HOSPITAL | Age: 31
End: 2021-01-21
Payer: MEDICARE

## 2021-01-21 ENCOUNTER — OFFICE VISIT (OUTPATIENT)
Dept: INTERNAL MEDICINE | Facility: CLINIC | Age: 31
End: 2021-01-21
Payer: MEDICARE

## 2021-01-21 ENCOUNTER — PATIENT MESSAGE (OUTPATIENT)
Dept: INTERNAL MEDICINE | Facility: CLINIC | Age: 31
End: 2021-01-21

## 2021-01-21 ENCOUNTER — IMMUNIZATION (OUTPATIENT)
Dept: INTERNAL MEDICINE | Facility: CLINIC | Age: 31
End: 2021-01-21
Payer: MEDICARE

## 2021-01-21 VITALS
HEART RATE: 98 BPM | DIASTOLIC BLOOD PRESSURE: 60 MMHG | RESPIRATION RATE: 20 BRPM | OXYGEN SATURATION: 98 % | HEIGHT: 63 IN | SYSTOLIC BLOOD PRESSURE: 108 MMHG | WEIGHT: 116.88 LBS | BODY MASS INDEX: 20.71 KG/M2

## 2021-01-21 DIAGNOSIS — K21.9 GASTROESOPHAGEAL REFLUX DISEASE WITHOUT ESOPHAGITIS: ICD-10-CM

## 2021-01-21 DIAGNOSIS — F31.9 BIPOLAR 1 DISORDER, DEPRESSED: ICD-10-CM

## 2021-01-21 DIAGNOSIS — E55.9 VITAMIN D DEFICIENCY, UNSPECIFIED: ICD-10-CM

## 2021-01-21 DIAGNOSIS — R79.9 ABNORMAL FINDING OF BLOOD CHEMISTRY, UNSPECIFIED: ICD-10-CM

## 2021-01-21 DIAGNOSIS — M54.9 CHRONIC BACK PAIN, UNSPECIFIED BACK LOCATION, UNSPECIFIED BACK PAIN LATERALITY: ICD-10-CM

## 2021-01-21 DIAGNOSIS — Z00.00 PREVENTATIVE HEALTH CARE: Primary | ICD-10-CM

## 2021-01-21 DIAGNOSIS — G89.29 CHRONIC BACK PAIN, UNSPECIFIED BACK LOCATION, UNSPECIFIED BACK PAIN LATERALITY: ICD-10-CM

## 2021-01-21 DIAGNOSIS — Z00.00 PREVENTATIVE HEALTH CARE: ICD-10-CM

## 2021-01-21 DIAGNOSIS — E87.6 HYPOKALEMIA: Primary | ICD-10-CM

## 2021-01-21 PROBLEM — K58.8 OTHER IRRITABLE BOWEL SYNDROME: Status: RESOLVED | Noted: 2019-11-16 | Resolved: 2021-01-21

## 2021-01-21 LAB
25(OH)D3+25(OH)D2 SERPL-MCNC: 30 NG/ML (ref 30–96)
ALBUMIN SERPL BCP-MCNC: 3.9 G/DL (ref 3.5–5.2)
ALP SERPL-CCNC: 58 U/L (ref 55–135)
ALT SERPL W/O P-5'-P-CCNC: 13 U/L (ref 10–44)
ANION GAP SERPL CALC-SCNC: 6 MMOL/L (ref 8–16)
AST SERPL-CCNC: 14 U/L (ref 10–40)
BASOPHILS # BLD AUTO: 0.04 K/UL (ref 0–0.2)
BASOPHILS NFR BLD: 0.9 % (ref 0–1.9)
BILIRUB SERPL-MCNC: 0.3 MG/DL (ref 0.1–1)
BUN SERPL-MCNC: 9 MG/DL (ref 6–20)
CALCIUM SERPL-MCNC: 8.8 MG/DL (ref 8.7–10.5)
CHLORIDE SERPL-SCNC: 107 MMOL/L (ref 95–110)
CHOLEST SERPL-MCNC: 190 MG/DL (ref 120–199)
CHOLEST/HDLC SERPL: 3.3 {RATIO} (ref 2–5)
CO2 SERPL-SCNC: 23 MMOL/L (ref 23–29)
CREAT SERPL-MCNC: 0.8 MG/DL (ref 0.5–1.4)
DIFFERENTIAL METHOD: ABNORMAL
EOSINOPHIL # BLD AUTO: 0.1 K/UL (ref 0–0.5)
EOSINOPHIL NFR BLD: 1.3 % (ref 0–8)
ERYTHROCYTE [DISTWIDTH] IN BLOOD BY AUTOMATED COUNT: 13 % (ref 11.5–14.5)
EST. GFR  (AFRICAN AMERICAN): >60 ML/MIN/1.73 M^2
EST. GFR  (NON AFRICAN AMERICAN): >60 ML/MIN/1.73 M^2
ESTIMATED AVG GLUCOSE: 88 MG/DL (ref 68–131)
GLUCOSE SERPL-MCNC: 80 MG/DL (ref 70–110)
HBA1C MFR BLD HPLC: 4.7 % (ref 4–5.6)
HCT VFR BLD AUTO: 39.2 % (ref 37–48.5)
HCV AB SERPL QL IA: NEGATIVE
HDLC SERPL-MCNC: 58 MG/DL (ref 40–75)
HDLC SERPL: 30.5 % (ref 20–50)
HGB BLD-MCNC: 12.3 G/DL (ref 12–16)
HIV 1+2 AB+HIV1 P24 AG SERPL QL IA: NEGATIVE
IMM GRANULOCYTES # BLD AUTO: 0.01 K/UL (ref 0–0.04)
IMM GRANULOCYTES NFR BLD AUTO: 0.2 % (ref 0–0.5)
LDLC SERPL CALC-MCNC: 111 MG/DL (ref 63–159)
LYMPHOCYTES # BLD AUTO: 1.3 K/UL (ref 1–4.8)
LYMPHOCYTES NFR BLD: 27.7 % (ref 18–48)
MCH RBC QN AUTO: 30.1 PG (ref 27–31)
MCHC RBC AUTO-ENTMCNC: 31.4 G/DL (ref 32–36)
MCV RBC AUTO: 96 FL (ref 82–98)
MONOCYTES # BLD AUTO: 0.5 K/UL (ref 0.3–1)
MONOCYTES NFR BLD: 11.3 % (ref 4–15)
NEUTROPHILS # BLD AUTO: 2.8 K/UL (ref 1.8–7.7)
NEUTROPHILS NFR BLD: 58.6 % (ref 38–73)
NONHDLC SERPL-MCNC: 132 MG/DL
NRBC BLD-RTO: 0 /100 WBC
PLATELET # BLD AUTO: 226 K/UL (ref 150–350)
PMV BLD AUTO: 8.5 FL (ref 9.2–12.9)
POTASSIUM SERPL-SCNC: 3.1 MMOL/L (ref 3.5–5.1)
PROT SERPL-MCNC: 6.9 G/DL (ref 6–8.4)
RBC # BLD AUTO: 4.08 M/UL (ref 4–5.4)
SODIUM SERPL-SCNC: 136 MMOL/L (ref 136–145)
TRIGL SERPL-MCNC: 105 MG/DL (ref 30–150)
TSH SERPL DL<=0.005 MIU/L-ACNC: 1.02 UIU/ML (ref 0.4–4)
WBC # BLD AUTO: 4.7 K/UL (ref 3.9–12.7)

## 2021-01-21 PROCEDURE — 82306 VITAMIN D 25 HYDROXY: CPT

## 2021-01-21 PROCEDURE — 99214 PR OFFICE/OUTPT VISIT, EST, LEVL IV, 30-39 MIN: ICD-10-PCS | Mod: S$PBB,,, | Performed by: NURSE PRACTITIONER

## 2021-01-21 PROCEDURE — 90686 IIV4 VACC NO PRSV 0.5 ML IM: CPT | Mod: PBBFAC

## 2021-01-21 PROCEDURE — 83036 HEMOGLOBIN GLYCOSYLATED A1C: CPT

## 2021-01-21 PROCEDURE — 99999 PR PBB SHADOW E&M-EST. PATIENT-LVL IV: CPT | Mod: PBBFAC,,, | Performed by: NURSE PRACTITIONER

## 2021-01-21 PROCEDURE — 99214 OFFICE O/P EST MOD 30 MIN: CPT | Mod: PBBFAC,25 | Performed by: NURSE PRACTITIONER

## 2021-01-21 PROCEDURE — 85025 COMPLETE CBC W/AUTO DIFF WBC: CPT

## 2021-01-21 PROCEDURE — 80053 COMPREHEN METABOLIC PANEL: CPT

## 2021-01-21 PROCEDURE — 36415 COLL VENOUS BLD VENIPUNCTURE: CPT

## 2021-01-21 PROCEDURE — 99214 OFFICE O/P EST MOD 30 MIN: CPT | Mod: S$PBB,,, | Performed by: NURSE PRACTITIONER

## 2021-01-21 PROCEDURE — 86803 HEPATITIS C AB TEST: CPT

## 2021-01-21 PROCEDURE — 84443 ASSAY THYROID STIM HORMONE: CPT

## 2021-01-21 PROCEDURE — 99999 PR PBB SHADOW E&M-EST. PATIENT-LVL IV: ICD-10-PCS | Mod: PBBFAC,,, | Performed by: NURSE PRACTITIONER

## 2021-01-21 PROCEDURE — 86703 HIV-1/HIV-2 1 RESULT ANTBDY: CPT

## 2021-01-21 PROCEDURE — 80061 LIPID PANEL: CPT

## 2021-01-21 RX ORDER — OMEPRAZOLE 20 MG/1
20 CAPSULE, DELAYED RELEASE ORAL DAILY
Qty: 30 CAPSULE | Refills: 2 | Status: SHIPPED | OUTPATIENT
Start: 2021-01-21 | End: 2021-04-30

## 2021-01-21 RX ORDER — LORAZEPAM 0.5 MG/1
0.5 TABLET ORAL EVERY 6 HOURS PRN
Qty: 20 TABLET | Refills: 0
Start: 2021-01-21 | End: 2023-11-02

## 2021-01-21 RX ORDER — FERROUS SULFATE 325(65) MG
325 TABLET ORAL
Qty: 30 TABLET | Refills: 2 | Status: SHIPPED | OUTPATIENT
Start: 2021-01-21 | End: 2021-04-30

## 2021-01-21 RX ORDER — POTASSIUM CHLORIDE 750 MG/1
TABLET, EXTENDED RELEASE ORAL
Qty: 30 TABLET | Refills: 0 | Status: SHIPPED | OUTPATIENT
Start: 2021-01-21 | End: 2021-02-02

## 2021-02-02 RX ORDER — POTASSIUM CHLORIDE 750 MG/1
10 TABLET, EXTENDED RELEASE ORAL DAILY
Qty: 30 TABLET | Refills: 0 | Status: SHIPPED | OUTPATIENT
Start: 2021-02-02 | End: 2021-02-02

## 2021-02-02 RX ORDER — POTASSIUM CHLORIDE 750 MG/1
10 TABLET, EXTENDED RELEASE ORAL DAILY
Qty: 30 TABLET | Refills: 0 | Status: SHIPPED | OUTPATIENT
Start: 2021-02-02 | End: 2021-04-20

## 2021-02-18 ENCOUNTER — PATIENT MESSAGE (OUTPATIENT)
Dept: INTERNAL MEDICINE | Facility: CLINIC | Age: 31
End: 2021-02-18

## 2021-03-02 ENCOUNTER — TELEPHONE (OUTPATIENT)
Dept: SPINE | Facility: CLINIC | Age: 31
End: 2021-03-02

## 2021-04-15 ENCOUNTER — PATIENT MESSAGE (OUTPATIENT)
Dept: RESEARCH | Facility: HOSPITAL | Age: 31
End: 2021-04-15

## 2021-04-16 ENCOUNTER — PATIENT MESSAGE (OUTPATIENT)
Dept: INTERNAL MEDICINE | Facility: CLINIC | Age: 31
End: 2021-04-16

## 2021-04-20 ENCOUNTER — TELEPHONE (OUTPATIENT)
Dept: INTERNAL MEDICINE | Facility: CLINIC | Age: 31
End: 2021-04-20

## 2021-04-20 ENCOUNTER — LAB VISIT (OUTPATIENT)
Dept: LAB | Facility: HOSPITAL | Age: 31
End: 2021-04-20
Attending: NURSE PRACTITIONER
Payer: MEDICARE

## 2021-04-20 DIAGNOSIS — E87.6 HYPOKALEMIA: ICD-10-CM

## 2021-04-20 LAB — POTASSIUM SERPL-SCNC: 3.8 MMOL/L (ref 3.5–5.1)

## 2021-04-20 PROCEDURE — 36415 COLL VENOUS BLD VENIPUNCTURE: CPT | Performed by: NURSE PRACTITIONER

## 2021-04-20 PROCEDURE — 84132 ASSAY OF SERUM POTASSIUM: CPT | Performed by: NURSE PRACTITIONER

## 2021-04-20 RX ORDER — POTASSIUM CHLORIDE 750 MG/1
10 TABLET, EXTENDED RELEASE ORAL DAILY
Qty: 30 TABLET | Refills: 0 | Status: SHIPPED | OUTPATIENT
Start: 2021-04-20 | End: 2023-11-02

## 2021-04-22 ENCOUNTER — PATIENT MESSAGE (OUTPATIENT)
Dept: INTERNAL MEDICINE | Facility: CLINIC | Age: 31
End: 2021-04-22

## 2021-04-22 RX ORDER — POTASSIUM CHLORIDE 750 MG/1
10 TABLET, EXTENDED RELEASE ORAL DAILY
Qty: 30 TABLET | Refills: 0 | OUTPATIENT
Start: 2021-04-22

## 2021-04-28 ENCOUNTER — PATIENT MESSAGE (OUTPATIENT)
Dept: RESEARCH | Facility: HOSPITAL | Age: 31
End: 2021-04-28

## 2021-05-13 ENCOUNTER — PATIENT MESSAGE (OUTPATIENT)
Dept: INTERNAL MEDICINE | Facility: CLINIC | Age: 31
End: 2021-05-13

## 2021-05-15 ENCOUNTER — PATIENT MESSAGE (OUTPATIENT)
Dept: INTERNAL MEDICINE | Facility: CLINIC | Age: 31
End: 2021-05-15

## 2021-05-16 ENCOUNTER — PATIENT MESSAGE (OUTPATIENT)
Dept: INTERNAL MEDICINE | Facility: CLINIC | Age: 31
End: 2021-05-16

## 2021-05-18 ENCOUNTER — TELEPHONE (OUTPATIENT)
Dept: PRIMARY CARE CLINIC | Facility: CLINIC | Age: 31
End: 2021-05-18

## 2021-05-18 ENCOUNTER — OFFICE VISIT (OUTPATIENT)
Dept: INTERNAL MEDICINE | Facility: CLINIC | Age: 31
End: 2021-05-18
Payer: MEDICARE

## 2021-05-18 ENCOUNTER — OUTPATIENT CASE MANAGEMENT (OUTPATIENT)
Dept: ADMINISTRATIVE | Facility: OTHER | Age: 31
End: 2021-05-18

## 2021-05-18 ENCOUNTER — LAB VISIT (OUTPATIENT)
Dept: LAB | Facility: HOSPITAL | Age: 31
End: 2021-05-18
Payer: MEDICARE

## 2021-05-18 ENCOUNTER — TELEPHONE (OUTPATIENT)
Dept: INTERNAL MEDICINE | Facility: CLINIC | Age: 31
End: 2021-05-18

## 2021-05-18 VITALS — SYSTOLIC BLOOD PRESSURE: 110 MMHG | DIASTOLIC BLOOD PRESSURE: 60 MMHG | BODY MASS INDEX: 20.7 KG/M2 | HEIGHT: 63 IN

## 2021-05-18 DIAGNOSIS — I10 ESSENTIAL HYPERTENSION: Primary | ICD-10-CM

## 2021-05-18 DIAGNOSIS — R79.89 OTHER SPECIFIED ABNORMAL FINDINGS OF BLOOD CHEMISTRY: ICD-10-CM

## 2021-05-18 DIAGNOSIS — Z59.9 FINANCIAL DIFFICULTIES: ICD-10-CM

## 2021-05-18 DIAGNOSIS — I10 ESSENTIAL HYPERTENSION: ICD-10-CM

## 2021-05-18 DIAGNOSIS — F31.9 BIPOLAR 1 DISORDER, DEPRESSED: ICD-10-CM

## 2021-05-18 LAB
ANION GAP SERPL CALC-SCNC: 11 MMOL/L (ref 8–16)
BASOPHILS # BLD AUTO: 0.06 K/UL (ref 0–0.2)
BASOPHILS NFR BLD: 0.7 % (ref 0–1.9)
BUN SERPL-MCNC: 9 MG/DL (ref 6–20)
CALCIUM SERPL-MCNC: 9.9 MG/DL (ref 8.7–10.5)
CHLORIDE SERPL-SCNC: 106 MMOL/L (ref 95–110)
CO2 SERPL-SCNC: 21 MMOL/L (ref 23–29)
CREAT SERPL-MCNC: 0.8 MG/DL (ref 0.5–1.4)
DIFFERENTIAL METHOD: ABNORMAL
EOSINOPHIL # BLD AUTO: 0.1 K/UL (ref 0–0.5)
EOSINOPHIL NFR BLD: 0.9 % (ref 0–8)
ERYTHROCYTE [DISTWIDTH] IN BLOOD BY AUTOMATED COUNT: 12.3 % (ref 11.5–14.5)
EST. GFR  (AFRICAN AMERICAN): >60 ML/MIN/1.73 M^2
EST. GFR  (NON AFRICAN AMERICAN): >60 ML/MIN/1.73 M^2
GLUCOSE SERPL-MCNC: 86 MG/DL (ref 70–110)
HCT VFR BLD AUTO: 42 % (ref 37–48.5)
HGB BLD-MCNC: 14.4 G/DL (ref 12–16)
IMM GRANULOCYTES # BLD AUTO: 0.03 K/UL (ref 0–0.04)
IMM GRANULOCYTES NFR BLD AUTO: 0.3 % (ref 0–0.5)
IRON SERPL-MCNC: 185 UG/DL (ref 30–160)
LYMPHOCYTES # BLD AUTO: 1.7 K/UL (ref 1–4.8)
LYMPHOCYTES NFR BLD: 20.1 % (ref 18–48)
MCH RBC QN AUTO: 33 PG (ref 27–31)
MCHC RBC AUTO-ENTMCNC: 34.3 G/DL (ref 32–36)
MCV RBC AUTO: 96 FL (ref 82–98)
MONOCYTES # BLD AUTO: 0.4 K/UL (ref 0.3–1)
MONOCYTES NFR BLD: 4.1 % (ref 4–15)
NEUTROPHILS # BLD AUTO: 6.4 K/UL (ref 1.8–7.7)
NEUTROPHILS NFR BLD: 73.9 % (ref 38–73)
NRBC BLD-RTO: 0 /100 WBC
PLATELET # BLD AUTO: 260 K/UL (ref 150–450)
PMV BLD AUTO: 8.5 FL (ref 9.2–12.9)
POTASSIUM SERPL-SCNC: 3.9 MMOL/L (ref 3.5–5.1)
RBC # BLD AUTO: 4.37 M/UL (ref 4–5.4)
SATURATED IRON: 55 % (ref 20–50)
SODIUM SERPL-SCNC: 138 MMOL/L (ref 136–145)
TOTAL IRON BINDING CAPACITY: 337 UG/DL (ref 250–450)
TRANSFERRIN SERPL-MCNC: 228 MG/DL (ref 200–375)
WBC # BLD AUTO: 8.64 K/UL (ref 3.9–12.7)

## 2021-05-18 PROCEDURE — 85025 COMPLETE CBC W/AUTO DIFF WBC: CPT | Performed by: NURSE PRACTITIONER

## 2021-05-18 PROCEDURE — 99214 PR OFFICE/OUTPT VISIT, EST, LEVL IV, 30-39 MIN: ICD-10-PCS | Mod: S$PBB,,, | Performed by: NURSE PRACTITIONER

## 2021-05-18 PROCEDURE — 80048 BASIC METABOLIC PNL TOTAL CA: CPT | Performed by: NURSE PRACTITIONER

## 2021-05-18 PROCEDURE — 36415 COLL VENOUS BLD VENIPUNCTURE: CPT | Performed by: NURSE PRACTITIONER

## 2021-05-18 PROCEDURE — 84466 ASSAY OF TRANSFERRIN: CPT | Performed by: NURSE PRACTITIONER

## 2021-05-18 PROCEDURE — 99999 PR PBB SHADOW E&M-EST. PATIENT-LVL IV: ICD-10-PCS | Mod: PBBFAC,,, | Performed by: NURSE PRACTITIONER

## 2021-05-18 PROCEDURE — 99214 OFFICE O/P EST MOD 30 MIN: CPT | Mod: PBBFAC | Performed by: NURSE PRACTITIONER

## 2021-05-18 PROCEDURE — 99214 OFFICE O/P EST MOD 30 MIN: CPT | Mod: S$PBB,,, | Performed by: NURSE PRACTITIONER

## 2021-05-18 PROCEDURE — 99999 PR PBB SHADOW E&M-EST. PATIENT-LVL IV: CPT | Mod: PBBFAC,,, | Performed by: NURSE PRACTITIONER

## 2021-05-18 SDOH — SOCIAL DETERMINANTS OF HEALTH (SDOH): PROBLEM RELATED TO HOUSING AND ECONOMIC CIRCUMSTANCES, UNSPECIFIED: Z59.9

## 2021-05-19 ENCOUNTER — TELEPHONE (OUTPATIENT)
Dept: INTERNAL MEDICINE | Facility: CLINIC | Age: 31
End: 2021-05-19

## 2021-05-19 ENCOUNTER — PATIENT MESSAGE (OUTPATIENT)
Dept: INTERNAL MEDICINE | Facility: CLINIC | Age: 31
End: 2021-05-19

## 2021-05-20 ENCOUNTER — PATIENT MESSAGE (OUTPATIENT)
Dept: INTERNAL MEDICINE | Facility: CLINIC | Age: 31
End: 2021-05-20

## 2021-05-24 ENCOUNTER — IMMUNIZATION (OUTPATIENT)
Dept: INTERNAL MEDICINE | Facility: CLINIC | Age: 31
End: 2021-05-24
Payer: MEDICARE

## 2021-05-24 DIAGNOSIS — Z23 NEED FOR VACCINATION: Primary | ICD-10-CM

## 2021-05-24 PROCEDURE — 0001A COVID-19, MRNA, LNP-S, PF, 30 MCG/0.3 ML DOSE VACCINE: CPT | Mod: PBBFAC

## 2021-05-26 ENCOUNTER — PATIENT MESSAGE (OUTPATIENT)
Dept: INTERNAL MEDICINE | Facility: CLINIC | Age: 31
End: 2021-05-26

## 2021-05-26 ENCOUNTER — PATIENT MESSAGE (OUTPATIENT)
Dept: ADMINISTRATIVE | Facility: OTHER | Age: 31
End: 2021-05-26

## 2021-05-26 ENCOUNTER — OUTPATIENT CASE MANAGEMENT (OUTPATIENT)
Dept: ADMINISTRATIVE | Facility: OTHER | Age: 31
End: 2021-05-26

## 2021-05-27 ENCOUNTER — OFFICE VISIT (OUTPATIENT)
Dept: INTERNAL MEDICINE | Facility: CLINIC | Age: 31
End: 2021-05-27
Payer: MEDICARE

## 2021-05-27 VITALS
HEIGHT: 63 IN | WEIGHT: 118.38 LBS | BODY MASS INDEX: 20.98 KG/M2 | HEART RATE: 68 BPM | DIASTOLIC BLOOD PRESSURE: 80 MMHG | SYSTOLIC BLOOD PRESSURE: 110 MMHG

## 2021-05-27 DIAGNOSIS — M54.9 UPPER BACK PAIN: ICD-10-CM

## 2021-05-27 DIAGNOSIS — F31.9 BIPOLAR 1 DISORDER, DEPRESSED: ICD-10-CM

## 2021-05-27 DIAGNOSIS — K21.9 GASTROESOPHAGEAL REFLUX DISEASE WITHOUT ESOPHAGITIS: ICD-10-CM

## 2021-05-27 DIAGNOSIS — I10 ESSENTIAL HYPERTENSION: Primary | ICD-10-CM

## 2021-05-27 PROCEDURE — 99999 PR PBB SHADOW E&M-EST. PATIENT-LVL III: ICD-10-PCS | Mod: PBBFAC,,, | Performed by: INTERNAL MEDICINE

## 2021-05-27 PROCEDURE — 99214 OFFICE O/P EST MOD 30 MIN: CPT | Mod: S$PBB,,, | Performed by: INTERNAL MEDICINE

## 2021-05-27 PROCEDURE — 99213 OFFICE O/P EST LOW 20 MIN: CPT | Mod: PBBFAC | Performed by: INTERNAL MEDICINE

## 2021-05-27 PROCEDURE — 99214 PR OFFICE/OUTPT VISIT, EST, LEVL IV, 30-39 MIN: ICD-10-PCS | Mod: S$PBB,,, | Performed by: INTERNAL MEDICINE

## 2021-05-27 PROCEDURE — 99999 PR PBB SHADOW E&M-EST. PATIENT-LVL III: CPT | Mod: PBBFAC,,, | Performed by: INTERNAL MEDICINE

## 2021-05-28 ENCOUNTER — OUTPATIENT CASE MANAGEMENT (OUTPATIENT)
Dept: ADMINISTRATIVE | Facility: OTHER | Age: 31
End: 2021-05-28

## 2021-06-02 ENCOUNTER — OUTPATIENT CASE MANAGEMENT (OUTPATIENT)
Dept: ADMINISTRATIVE | Facility: OTHER | Age: 31
End: 2021-06-02

## 2021-06-08 ENCOUNTER — PATIENT MESSAGE (OUTPATIENT)
Dept: ADMINISTRATIVE | Facility: OTHER | Age: 31
End: 2021-06-08

## 2021-06-09 ENCOUNTER — OUTPATIENT CASE MANAGEMENT (OUTPATIENT)
Dept: ADMINISTRATIVE | Facility: OTHER | Age: 31
End: 2021-06-09

## 2021-06-21 ENCOUNTER — IMMUNIZATION (OUTPATIENT)
Dept: INTERNAL MEDICINE | Facility: CLINIC | Age: 31
End: 2021-06-21
Payer: MEDICARE

## 2021-06-21 DIAGNOSIS — Z23 NEED FOR VACCINATION: Primary | ICD-10-CM

## 2021-06-21 PROCEDURE — 91300 COVID-19, MRNA, LNP-S, PF, 30 MCG/0.3 ML DOSE VACCINE: CPT | Mod: PBBFAC

## 2021-06-21 PROCEDURE — 0002A COVID-19, MRNA, LNP-S, PF, 30 MCG/0.3 ML DOSE VACCINE: CPT | Mod: PBBFAC

## 2021-06-22 ENCOUNTER — OUTPATIENT CASE MANAGEMENT (OUTPATIENT)
Dept: ADMINISTRATIVE | Facility: OTHER | Age: 31
End: 2021-06-22

## 2021-06-24 ENCOUNTER — OUTPATIENT CASE MANAGEMENT (OUTPATIENT)
Dept: ADMINISTRATIVE | Facility: OTHER | Age: 31
End: 2021-06-24

## 2021-06-28 ENCOUNTER — OUTPATIENT CASE MANAGEMENT (OUTPATIENT)
Dept: ADMINISTRATIVE | Facility: OTHER | Age: 31
End: 2021-06-28

## 2021-06-29 ENCOUNTER — OUTPATIENT CASE MANAGEMENT (OUTPATIENT)
Dept: ADMINISTRATIVE | Facility: OTHER | Age: 31
End: 2021-06-29

## 2021-06-30 ENCOUNTER — OUTPATIENT CASE MANAGEMENT (OUTPATIENT)
Dept: ADMINISTRATIVE | Facility: OTHER | Age: 31
End: 2021-06-30

## 2021-07-01 ENCOUNTER — OUTPATIENT CASE MANAGEMENT (OUTPATIENT)
Dept: ADMINISTRATIVE | Facility: OTHER | Age: 31
End: 2021-07-01

## 2021-07-12 ENCOUNTER — OUTPATIENT CASE MANAGEMENT (OUTPATIENT)
Dept: ADMINISTRATIVE | Facility: OTHER | Age: 31
End: 2021-07-12

## 2021-07-19 ENCOUNTER — OUTPATIENT CASE MANAGEMENT (OUTPATIENT)
Dept: ADMINISTRATIVE | Facility: OTHER | Age: 31
End: 2021-07-19

## 2021-07-20 ENCOUNTER — OUTPATIENT CASE MANAGEMENT (OUTPATIENT)
Dept: ADMINISTRATIVE | Facility: OTHER | Age: 31
End: 2021-07-20

## 2021-07-23 ENCOUNTER — OUTPATIENT CASE MANAGEMENT (OUTPATIENT)
Dept: ADMINISTRATIVE | Facility: OTHER | Age: 31
End: 2021-07-23

## 2021-07-26 ENCOUNTER — OUTPATIENT CASE MANAGEMENT (OUTPATIENT)
Dept: ADMINISTRATIVE | Facility: OTHER | Age: 31
End: 2021-07-26

## 2021-07-28 ENCOUNTER — OUTPATIENT CASE MANAGEMENT (OUTPATIENT)
Dept: ADMINISTRATIVE | Facility: OTHER | Age: 31
End: 2021-07-28

## 2021-08-02 ENCOUNTER — PATIENT MESSAGE (OUTPATIENT)
Dept: INTERNAL MEDICINE | Facility: CLINIC | Age: 31
End: 2021-08-02

## 2021-08-02 ENCOUNTER — OUTPATIENT CASE MANAGEMENT (OUTPATIENT)
Dept: ADMINISTRATIVE | Facility: OTHER | Age: 31
End: 2021-08-02

## 2021-08-03 RX ORDER — OMEPRAZOLE 20 MG/1
20 CAPSULE, DELAYED RELEASE ORAL DAILY
Qty: 90 CAPSULE | Refills: 0 | OUTPATIENT
Start: 2021-08-03

## 2021-08-09 ENCOUNTER — OUTPATIENT CASE MANAGEMENT (OUTPATIENT)
Dept: ADMINISTRATIVE | Facility: OTHER | Age: 31
End: 2021-08-09

## 2021-08-13 ENCOUNTER — PATIENT MESSAGE (OUTPATIENT)
Dept: INTERNAL MEDICINE | Facility: CLINIC | Age: 31
End: 2021-08-13

## 2021-08-13 ENCOUNTER — OUTPATIENT CASE MANAGEMENT (OUTPATIENT)
Dept: ADMINISTRATIVE | Facility: OTHER | Age: 31
End: 2021-08-13

## 2021-08-16 ENCOUNTER — PATIENT MESSAGE (OUTPATIENT)
Dept: INTERNAL MEDICINE | Facility: CLINIC | Age: 31
End: 2021-08-16

## 2021-08-17 ENCOUNTER — TELEPHONE (OUTPATIENT)
Dept: INTERNAL MEDICINE | Facility: CLINIC | Age: 31
End: 2021-08-17

## 2021-08-23 ENCOUNTER — PATIENT MESSAGE (OUTPATIENT)
Dept: INTERNAL MEDICINE | Facility: CLINIC | Age: 31
End: 2021-08-23

## 2021-08-23 ENCOUNTER — OUTPATIENT CASE MANAGEMENT (OUTPATIENT)
Dept: ADMINISTRATIVE | Facility: OTHER | Age: 31
End: 2021-08-23

## 2021-08-24 RX ORDER — OMEPRAZOLE 20 MG/1
20 CAPSULE, DELAYED RELEASE ORAL DAILY
Qty: 90 CAPSULE | Refills: 0 | Status: SHIPPED | OUTPATIENT
Start: 2021-08-24 | End: 2021-08-27 | Stop reason: SDUPTHER

## 2021-08-25 ENCOUNTER — DOCUMENTATION ONLY (OUTPATIENT)
Dept: ADMINISTRATIVE | Facility: OTHER | Age: 31
End: 2021-08-25

## 2021-08-27 ENCOUNTER — OFFICE VISIT (OUTPATIENT)
Dept: INTERNAL MEDICINE | Facility: CLINIC | Age: 31
End: 2021-08-27
Payer: MEDICARE

## 2021-08-27 ENCOUNTER — PATIENT MESSAGE (OUTPATIENT)
Dept: INTERNAL MEDICINE | Facility: CLINIC | Age: 31
End: 2021-08-27

## 2021-08-27 VITALS
HEIGHT: 63 IN | SYSTOLIC BLOOD PRESSURE: 125 MMHG | HEART RATE: 116 BPM | BODY MASS INDEX: 20.94 KG/M2 | DIASTOLIC BLOOD PRESSURE: 84 MMHG | WEIGHT: 118.19 LBS

## 2021-08-27 DIAGNOSIS — F31.9 BIPOLAR 1 DISORDER, DEPRESSED: ICD-10-CM

## 2021-08-27 DIAGNOSIS — M54.40 CHRONIC LOW BACK PAIN WITH SCIATICA, SCIATICA LATERALITY UNSPECIFIED, UNSPECIFIED BACK PAIN LATERALITY: ICD-10-CM

## 2021-08-27 DIAGNOSIS — R00.0 TACHYCARDIA: ICD-10-CM

## 2021-08-27 DIAGNOSIS — G89.29 CHRONIC LOW BACK PAIN WITH SCIATICA, SCIATICA LATERALITY UNSPECIFIED, UNSPECIFIED BACK PAIN LATERALITY: ICD-10-CM

## 2021-08-27 DIAGNOSIS — R06.02 SOB (SHORTNESS OF BREATH): Primary | ICD-10-CM

## 2021-08-27 DIAGNOSIS — I10 ESSENTIAL HYPERTENSION: ICD-10-CM

## 2021-08-27 DIAGNOSIS — R53.83 FATIGUE, UNSPECIFIED TYPE: ICD-10-CM

## 2021-08-27 PROCEDURE — 99999 PR PBB SHADOW E&M-EST. PATIENT-LVL III: ICD-10-PCS | Mod: PBBFAC,,, | Performed by: INTERNAL MEDICINE

## 2021-08-27 PROCEDURE — 99214 PR OFFICE/OUTPT VISIT, EST, LEVL IV, 30-39 MIN: ICD-10-PCS | Mod: S$PBB,,, | Performed by: INTERNAL MEDICINE

## 2021-08-27 PROCEDURE — 99214 OFFICE O/P EST MOD 30 MIN: CPT | Mod: S$PBB,,, | Performed by: INTERNAL MEDICINE

## 2021-08-27 PROCEDURE — 99213 OFFICE O/P EST LOW 20 MIN: CPT | Mod: PBBFAC | Performed by: INTERNAL MEDICINE

## 2021-08-27 PROCEDURE — 99999 PR PBB SHADOW E&M-EST. PATIENT-LVL III: CPT | Mod: PBBFAC,,, | Performed by: INTERNAL MEDICINE

## 2021-09-07 ENCOUNTER — OUTPATIENT CASE MANAGEMENT (OUTPATIENT)
Dept: ADMINISTRATIVE | Facility: OTHER | Age: 31
End: 2021-09-07

## 2021-09-08 ENCOUNTER — OUTPATIENT CASE MANAGEMENT (OUTPATIENT)
Dept: ADMINISTRATIVE | Facility: OTHER | Age: 31
End: 2021-09-08

## 2021-09-09 ENCOUNTER — PATIENT MESSAGE (OUTPATIENT)
Dept: INTERNAL MEDICINE | Facility: CLINIC | Age: 31
End: 2021-09-09

## 2021-09-20 ENCOUNTER — PATIENT MESSAGE (OUTPATIENT)
Dept: INTERNAL MEDICINE | Facility: CLINIC | Age: 31
End: 2021-09-20

## 2021-09-20 DIAGNOSIS — R21 RASH: Primary | ICD-10-CM

## 2021-09-21 ENCOUNTER — TELEPHONE (OUTPATIENT)
Dept: DERMATOLOGY | Facility: CLINIC | Age: 31
End: 2021-09-21

## 2021-09-22 ENCOUNTER — PATIENT MESSAGE (OUTPATIENT)
Dept: INTERNAL MEDICINE | Facility: CLINIC | Age: 31
End: 2021-09-22

## 2021-09-22 RX ORDER — HYDROXYZINE HYDROCHLORIDE 25 MG/1
25 TABLET, FILM COATED ORAL 3 TIMES DAILY PRN
Qty: 20 TABLET | Refills: 0 | Status: SHIPPED | OUTPATIENT
Start: 2021-09-22 | End: 2021-09-22 | Stop reason: SDUPTHER

## 2021-09-23 ENCOUNTER — OUTPATIENT CASE MANAGEMENT (OUTPATIENT)
Dept: ADMINISTRATIVE | Facility: OTHER | Age: 31
End: 2021-09-23

## 2021-09-27 ENCOUNTER — PATIENT MESSAGE (OUTPATIENT)
Dept: INTERNAL MEDICINE | Facility: CLINIC | Age: 31
End: 2021-09-27

## 2021-09-27 ENCOUNTER — HOSPITAL ENCOUNTER (OUTPATIENT)
Dept: RADIOLOGY | Facility: HOSPITAL | Age: 31
Discharge: HOME | End: 2021-09-27
Attending: INTERNAL MEDICINE
Payer: MEDICARE

## 2021-09-27 ENCOUNTER — HOSPITAL ENCOUNTER (OUTPATIENT)
Dept: CARDIOLOGY | Facility: HOSPITAL | Age: 31
Discharge: HOME | End: 2021-09-27
Attending: INTERNAL MEDICINE
Payer: MEDICARE

## 2021-09-27 VITALS
WEIGHT: 116 LBS | BODY MASS INDEX: 20.55 KG/M2 | SYSTOLIC BLOOD PRESSURE: 98 MMHG | DIASTOLIC BLOOD PRESSURE: 70 MMHG | HEART RATE: 94 BPM | HEIGHT: 63 IN

## 2021-09-27 DIAGNOSIS — I10 ESSENTIAL HYPERTENSION: ICD-10-CM

## 2021-09-27 DIAGNOSIS — R53.83 FATIGUE, UNSPECIFIED TYPE: ICD-10-CM

## 2021-09-27 DIAGNOSIS — R06.02 SOB (SHORTNESS OF BREATH): ICD-10-CM

## 2021-09-27 LAB
ASCENDING AORTA: 2.58 CM
AV INDEX (PROSTH): 0.69
AV MEAN GRADIENT: 3 MMHG
AV PEAK GRADIENT: 5 MMHG
AV VALVE AREA: 1.91 CM2
AV VELOCITY RATIO: 0.72
BSA FOR ECHO PROCEDURE: 1.53 M2
CV ECHO LV RWT: 0.32 CM
DOP CALC AO PEAK VEL: 1.17 M/S
DOP CALC AO VTI: 21.32 CM
DOP CALC LVOT AREA: 2.8 CM2
DOP CALC LVOT DIAMETER: 1.88 CM
DOP CALC LVOT PEAK VEL: 0.84 M/S
DOP CALC LVOT STROKE VOLUME: 40.73 CM3
DOP CALCLVOT PEAK VEL VTI: 14.68 CM
E WAVE DECELERATION TIME: 174.68 MSEC
E/A RATIO: 0.97
E/E' RATIO: 6.2 M/S
ECHO LV POSTERIOR WALL: 0.64 CM (ref 0.6–1.1)
EJECTION FRACTION: 55 %
FRACTIONAL SHORTENING: 29 % (ref 28–44)
INTERVENTRICULAR SEPTUM: 0.61 CM (ref 0.6–1.1)
IVRT: 79.92 MSEC
LA MAJOR: 3.77 CM
LA MINOR: 3.91 CM
LA WIDTH: 2.84 CM
LEFT ATRIUM SIZE: 2.4 CM
LEFT ATRIUM VOLUME INDEX MOD: 13.2 ML/M2
LEFT ATRIUM VOLUME INDEX: 14.5 ML/M2
LEFT ATRIUM VOLUME MOD: 20.17 CM3
LEFT ATRIUM VOLUME: 22.24 CM3
LEFT INTERNAL DIMENSION IN SYSTOLE: 2.8 CM (ref 2.1–4)
LEFT VENTRICLE DIASTOLIC VOLUME INDEX: 44.7 ML/M2
LEFT VENTRICLE DIASTOLIC VOLUME: 68.39 ML
LEFT VENTRICLE MASS INDEX: 44 G/M2
LEFT VENTRICLE SYSTOLIC VOLUME INDEX: 19.4 ML/M2
LEFT VENTRICLE SYSTOLIC VOLUME: 29.66 ML
LEFT VENTRICULAR INTERNAL DIMENSION IN DIASTOLE: 3.96 CM (ref 3.5–6)
LEFT VENTRICULAR MASS: 66.6 G
LV LATERAL E/E' RATIO: 5.17 M/S
LV SEPTAL E/E' RATIO: 7.75 M/S
MV A" WAVE DURATION": 7.99 MSEC
MV PEAK A VEL: 0.64 M/S
MV PEAK E VEL: 0.62 M/S
MV STENOSIS PRESSURE HALF TIME: 50.66 MS
MV VALVE AREA P 1/2 METHOD: 4.34 CM2
PULM VEIN S/D RATIO: 1.02
PV PEAK D VEL: 0.48 M/S
PV PEAK S VEL: 0.49 M/S
QEF: 57 %
RA MAJOR: 3.66 CM
RA PRESSURE: 3 MMHG
RA WIDTH: 2.73 CM
RIGHT VENTRICULAR END-DIASTOLIC DIMENSION: 2.68 CM
RV TISSUE DOPPLER FREE WALL SYSTOLIC VELOCITY 1 (APICAL 4 CHAMBER VIEW): 10.55 CM/S
SINUS: 2.63 CM
STJ: 2.08 CM
TDI LATERAL: 0.12 M/S
TDI SEPTAL: 0.08 M/S
TDI: 0.1 M/S
TRICUSPID ANNULAR PLANE SYSTOLIC EXCURSION: 1.74 CM

## 2021-09-27 PROCEDURE — 71046 X-RAY EXAM CHEST 2 VIEWS: CPT | Mod: 26,,, | Performed by: INTERNAL MEDICINE

## 2021-09-27 PROCEDURE — 71046 XR CHEST PA AND LATERAL: ICD-10-PCS | Mod: 26,,, | Performed by: INTERNAL MEDICINE

## 2021-09-27 PROCEDURE — 93356 ECHO (CUPID ONLY): ICD-10-PCS | Mod: ,,, | Performed by: INTERNAL MEDICINE

## 2021-09-27 PROCEDURE — 93306 ECHO (CUPID ONLY): ICD-10-PCS | Mod: 26,,, | Performed by: INTERNAL MEDICINE

## 2021-09-27 PROCEDURE — 93306 TTE W/DOPPLER COMPLETE: CPT

## 2021-09-27 PROCEDURE — 93306 TTE W/DOPPLER COMPLETE: CPT | Mod: 26,,, | Performed by: INTERNAL MEDICINE

## 2021-09-27 PROCEDURE — 71046 X-RAY EXAM CHEST 2 VIEWS: CPT | Mod: TC,FY

## 2021-09-27 PROCEDURE — 93356 MYOCRD STRAIN IMG SPCKL TRCK: CPT | Mod: ,,, | Performed by: INTERNAL MEDICINE

## 2021-09-28 ENCOUNTER — PATIENT MESSAGE (OUTPATIENT)
Dept: INTERNAL MEDICINE | Facility: CLINIC | Age: 31
End: 2021-09-28

## 2021-10-01 ENCOUNTER — PATIENT MESSAGE (OUTPATIENT)
Dept: INTERNAL MEDICINE | Facility: CLINIC | Age: 31
End: 2021-10-01

## 2021-11-19 ENCOUNTER — PATIENT MESSAGE (OUTPATIENT)
Dept: INTERNAL MEDICINE | Facility: CLINIC | Age: 31
End: 2021-11-19
Payer: MEDICARE

## 2021-11-19 DIAGNOSIS — H53.9 VISUAL DISTURBANCE: Primary | ICD-10-CM

## 2021-11-25 ENCOUNTER — PATIENT MESSAGE (OUTPATIENT)
Dept: INTERNAL MEDICINE | Facility: CLINIC | Age: 31
End: 2021-11-25
Payer: MEDICARE

## 2021-11-29 ENCOUNTER — PATIENT MESSAGE (OUTPATIENT)
Dept: INTERNAL MEDICINE | Facility: CLINIC | Age: 31
End: 2021-11-29
Payer: MEDICARE

## 2022-01-18 ENCOUNTER — PATIENT MESSAGE (OUTPATIENT)
Dept: INTERNAL MEDICINE | Facility: CLINIC | Age: 32
End: 2022-01-18
Payer: MEDICARE

## 2022-01-18 RX ORDER — OXCARBAZEPINE 300 MG/1
150 TABLET, FILM COATED ORAL 2 TIMES DAILY
Status: CANCELLED | OUTPATIENT
Start: 2022-01-18

## 2022-01-19 ENCOUNTER — PATIENT MESSAGE (OUTPATIENT)
Dept: INTERNAL MEDICINE | Facility: CLINIC | Age: 32
End: 2022-01-19
Payer: MEDICARE

## 2022-02-28 ENCOUNTER — PES CALL (OUTPATIENT)
Dept: ADMINISTRATIVE | Facility: CLINIC | Age: 32
End: 2022-02-28
Payer: MEDICARE

## 2022-03-11 ENCOUNTER — TELEPHONE (OUTPATIENT)
Dept: ADMINISTRATIVE | Facility: CLINIC | Age: 32
End: 2022-03-11
Payer: MEDICARE

## 2022-03-14 ENCOUNTER — OFFICE VISIT (OUTPATIENT)
Dept: INTERNAL MEDICINE | Facility: CLINIC | Age: 32
End: 2022-03-14
Payer: MEDICARE

## 2022-03-14 VITALS
SYSTOLIC BLOOD PRESSURE: 96 MMHG | HEART RATE: 85 BPM | BODY MASS INDEX: 20.28 KG/M2 | WEIGHT: 114.44 LBS | RESPIRATION RATE: 16 BRPM | HEIGHT: 63 IN | DIASTOLIC BLOOD PRESSURE: 60 MMHG

## 2022-03-14 DIAGNOSIS — K21.9 GASTROESOPHAGEAL REFLUX DISEASE WITHOUT ESOPHAGITIS: ICD-10-CM

## 2022-03-14 DIAGNOSIS — I10 ESSENTIAL HYPERTENSION: ICD-10-CM

## 2022-03-14 DIAGNOSIS — F31.9 BIPOLAR 1 DISORDER, DEPRESSED: ICD-10-CM

## 2022-03-14 DIAGNOSIS — G89.29 CHRONIC BACK PAIN, UNSPECIFIED BACK LOCATION, UNSPECIFIED BACK PAIN LATERALITY: ICD-10-CM

## 2022-03-14 DIAGNOSIS — Z00.00 ENCOUNTER FOR PREVENTIVE HEALTH EXAMINATION: Primary | ICD-10-CM

## 2022-03-14 DIAGNOSIS — M54.9 CHRONIC BACK PAIN, UNSPECIFIED BACK LOCATION, UNSPECIFIED BACK PAIN LATERALITY: ICD-10-CM

## 2022-03-14 PROCEDURE — 99999 PR PBB SHADOW E&M-EST. PATIENT-LVL IV: CPT | Mod: PBBFAC,,, | Performed by: NURSE PRACTITIONER

## 2022-03-14 PROCEDURE — 99214 OFFICE O/P EST MOD 30 MIN: CPT | Mod: PBBFAC | Performed by: NURSE PRACTITIONER

## 2022-03-14 PROCEDURE — G0439 PPPS, SUBSEQ VISIT: HCPCS | Mod: ,,, | Performed by: NURSE PRACTITIONER

## 2022-03-14 PROCEDURE — 99999 PR PBB SHADOW E&M-EST. PATIENT-LVL IV: ICD-10-PCS | Mod: PBBFAC,,, | Performed by: NURSE PRACTITIONER

## 2022-03-14 PROCEDURE — G0439 PR MEDICARE ANNUAL WELLNESS SUBSEQUENT VISIT: ICD-10-PCS | Mod: ,,, | Performed by: NURSE PRACTITIONER

## 2022-03-14 RX ORDER — IBUPROFEN 800 MG/1
800 TABLET ORAL 3 TIMES DAILY
COMMUNITY
Start: 2021-11-17 | End: 2022-11-22

## 2022-03-14 RX ORDER — AMMONIUM LACTATE 12 G/100G
CREAM TOPICAL DAILY
COMMUNITY
Start: 2022-02-23 | End: 2022-11-22

## 2022-03-14 NOTE — PATIENT INSTRUCTIONS
1. Schedule annual with Dr. Ghanshyam Ramos MD around May 2022.    2. Tetanus booster if you have an accident.     Counseling and Referral of Other Preventative  (Italic type indicates deductible and co-insurance are waived)    Patient Name: Patsy Akins  Today's Date: 3/14/2022    Health Maintenance         Date Due Completion Date    TETANUS VACCINE Never done ---    Influenza Vaccine (1) 09/01/2021 1/21/2021    Cervical Cancer Screening 06/05/2024 6/5/2019          No orders of the defined types were placed in this encounter.    The following information is provided to all patients.  This information is to help you find resources for any of the problems found today that may be affecting your health:                Living healthy guide: www.[facility].[place].gov      Understanding Diabetes: www.diabetes.org      Eating healthy: www.cdc.gov/healthyweight      [facility] home safety checklist: www.cdc.gov/steadi/patient.html      Agency on Aging: www.goea.[place].[facility]      Alcoholics anonymous (AA): www.aa.org      Physical Activity: www.elbert.nih.gov/kk8drma      Tobacco use: www.quitwithusla.org

## 2022-03-14 NOTE — PROGRESS NOTES
"Patsy Akins presented for a  Medicare AWV and comprehensive Health Risk Assessment today. The following components were reviewed and updated:    · Medical history  · Family History  · Social history  · Allergies and Current Medications  · Health Risk Assessment  · Health Maintenance  · Care Team         ** See Completed Assessments for Annual Wellness Visit within the encounter summary.**         The following assessments were completed:  · Living Situation  · CAGE  · Depression Screening  · Timed Get Up and Go  · Whisper Test  · Cognitive Function Screening - abnormal clock drawing, of note: pt states she has dyslexia. Recalled 3/3 words at 3 minutes.    · Nutrition Screening  · ADL Screening  · PAQ Screening        Vitals:    03/14/22 1434   BP: 96/60   BP Location: Right arm   Patient Position: Sitting   BP Method: Medium (Manual)   Pulse: 85   Resp: 16   Weight: 51.9 kg (114 lb 6.7 oz)   Height: 5' 3" (1.6 m)     Body mass index is 20.27 kg/m².     Physical Exam  Vitals reviewed.   Constitutional:       Appearance: Normal appearance.   HENT:      Head: Normocephalic.   Cardiovascular:      Rate and Rhythm: Regular rhythm. Tachycardia present.   Pulmonary:      Effort: Pulmonary effort is normal.      Breath sounds: Normal breath sounds.   Abdominal:      General: Bowel sounds are normal.   Musculoskeletal:         General: Normal range of motion.      Cervical back: Normal range of motion.      Right lower leg: No edema.      Left lower leg: No edema.   Skin:     General: Skin is warm and dry.      Capillary Refill: Capillary refill takes less than 2 seconds.   Neurological:      Mental Status: She is alert and oriented to person, place, and time.   Psychiatric:      Comments: Appears anxious, pressured speech.               Diagnoses and health risks identified today and associated recommendations/orders:    1. Encounter for preventive health examination  Assessments completed.   recommendations " reviewed. Tetanus and flu vaccines as discussed.  Schedule annual with PCP around May 2022.    2. Bipolar 1 disorder, depressed  Chronic, stable on current regimen. Followed by psychiatry. States she feels like mood is on the upswing, having more good days than bad.     3. Essential hypertension  Chronic, stable on current regimen. Followed by PCP.    4. Gastroesophageal reflux disease without esophagitis  Chronic, stable on current regimen. Followed by PCP.    5. Chronic back pain, unspecified back location, unspecified back pain laterality  Chronic, stable on current regimen. Followed by PCP.       Provided Patsy Camargo with a 5-10 year written screening schedule and personal prevention plan. Recommendations were developed using the USPSTF age appropriate recommendations. Education, counseling, and referrals were provided as needed. After Visit Summary printed and given to patient which includes a list of additional screenings\tests needed.    Follow up in about 1 year (around 3/14/2023) for Medicare AWV and with PCP as instructed.       Haley Kwok, BERTIN     I offered to discuss advanced care planning, including how to pick a person who would make decisions for you if you were unable to make them for yourself, called a health care power of , and what kind of decisions you might make such as use of life sustaining treatments such as ventilators and tube feeding when faced with a life limiting illness recorded on a living will that they will need to know. (How you want to be cared for as you near the end of your natural life)     X  Patient is unable to engage in a discussion regarding advanced directives due to severe physical and/or cognitive impairment.
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
## 2022-07-11 ENCOUNTER — HOSPITAL ENCOUNTER (EMERGENCY)
Facility: HOSPITAL | Age: 32
Discharge: HOME | End: 2022-07-11
Attending: EMERGENCY MEDICINE
Payer: MEDICARE

## 2022-07-11 ENCOUNTER — NURSE TRIAGE (OUTPATIENT)
Dept: ADMINISTRATIVE | Facility: CLINIC | Age: 32
End: 2022-07-11
Payer: MEDICARE

## 2022-07-11 ENCOUNTER — PATIENT MESSAGE (OUTPATIENT)
Dept: INTERNAL MEDICINE | Facility: CLINIC | Age: 32
End: 2022-07-11
Payer: MEDICARE

## 2022-07-11 VITALS
OXYGEN SATURATION: 97 % | BODY MASS INDEX: 20.2 KG/M2 | HEART RATE: 96 BPM | WEIGHT: 114 LBS | RESPIRATION RATE: 16 BRPM | DIASTOLIC BLOOD PRESSURE: 68 MMHG | TEMPERATURE: 98 F | HEIGHT: 63 IN | SYSTOLIC BLOOD PRESSURE: 107 MMHG

## 2022-07-11 DIAGNOSIS — M62.838 MUSCLE SPASMS OF NECK: Primary | ICD-10-CM

## 2022-07-11 DIAGNOSIS — R56.9 SEIZURE-LIKE ACTIVITY: ICD-10-CM

## 2022-07-11 DIAGNOSIS — G43.809 OTHER MIGRAINE WITHOUT STATUS MIGRAINOSUS, NOT INTRACTABLE: ICD-10-CM

## 2022-07-11 DIAGNOSIS — R53.83 FATIGUE: ICD-10-CM

## 2022-07-11 DIAGNOSIS — R00.0 TACHYCARDIA: ICD-10-CM

## 2022-07-11 LAB
ALBUMIN SERPL BCP-MCNC: 3.7 G/DL (ref 3.5–5.2)
ALP SERPL-CCNC: 65 U/L (ref 55–135)
ALT SERPL W/O P-5'-P-CCNC: 17 U/L (ref 10–44)
AMPHET+METHAMPHET UR QL: NEGATIVE
ANION GAP SERPL CALC-SCNC: 9 MMOL/L (ref 8–16)
APAP SERPL-MCNC: <3 UG/ML (ref 10–20)
AST SERPL-CCNC: 19 U/L (ref 10–40)
B-HCG UR QL: NEGATIVE
BARBITURATES UR QL SCN>200 NG/ML: NEGATIVE
BASOPHILS # BLD AUTO: 0.04 K/UL (ref 0–0.2)
BASOPHILS NFR BLD: 0.5 % (ref 0–1.9)
BENZODIAZ UR QL SCN>200 NG/ML: NEGATIVE
BILIRUB SERPL-MCNC: 0.2 MG/DL (ref 0.1–1)
BILIRUB UR QL STRIP: NEGATIVE
BUN SERPL-MCNC: 11 MG/DL (ref 6–20)
BZE UR QL SCN: NEGATIVE
CALCIUM SERPL-MCNC: 8.9 MG/DL (ref 8.7–10.5)
CANNABINOIDS UR QL SCN: NEGATIVE
CHLORIDE SERPL-SCNC: 104 MMOL/L (ref 95–110)
CLARITY UR: CLEAR
CO2 SERPL-SCNC: 25 MMOL/L (ref 23–29)
COLOR UR: YELLOW
CREAT SERPL-MCNC: 0.8 MG/DL (ref 0.5–1.4)
CREAT UR-MCNC: 91.6 MG/DL (ref 15–325)
CTP QC/QA: YES
DIFFERENTIAL METHOD: ABNORMAL
EOSINOPHIL # BLD AUTO: 0.1 K/UL (ref 0–0.5)
EOSINOPHIL NFR BLD: 1 % (ref 0–8)
ERYTHROCYTE [DISTWIDTH] IN BLOOD BY AUTOMATED COUNT: 12.4 % (ref 11.5–14.5)
EST. GFR  (AFRICAN AMERICAN): >60 ML/MIN/1.73 M^2
EST. GFR  (NON AFRICAN AMERICAN): >60 ML/MIN/1.73 M^2
ETHANOL SERPL-MCNC: <10 MG/DL
GLUCOSE SERPL-MCNC: 103 MG/DL (ref 70–110)
GLUCOSE UR QL STRIP: NEGATIVE
HCT VFR BLD AUTO: 36.8 % (ref 37–48.5)
HGB BLD-MCNC: 12.3 G/DL (ref 12–16)
HGB UR QL STRIP: NEGATIVE
IMM GRANULOCYTES # BLD AUTO: 0.03 K/UL (ref 0–0.04)
IMM GRANULOCYTES NFR BLD AUTO: 0.4 % (ref 0–0.5)
KETONES UR QL STRIP: NEGATIVE
LEUKOCYTE ESTERASE UR QL STRIP: NEGATIVE
LYMPHOCYTES # BLD AUTO: 1.5 K/UL (ref 1–4.8)
LYMPHOCYTES NFR BLD: 18.7 % (ref 18–48)
MCH RBC QN AUTO: 29.4 PG (ref 27–31)
MCHC RBC AUTO-ENTMCNC: 33.4 G/DL (ref 32–36)
MCV RBC AUTO: 88 FL (ref 82–98)
METHADONE UR QL SCN>300 NG/ML: NEGATIVE
MONOCYTES # BLD AUTO: 0.5 K/UL (ref 0.3–1)
MONOCYTES NFR BLD: 6.8 % (ref 4–15)
NEUTROPHILS # BLD AUTO: 5.7 K/UL (ref 1.8–7.7)
NEUTROPHILS NFR BLD: 72.6 % (ref 38–73)
NITRITE UR QL STRIP: NEGATIVE
NRBC BLD-RTO: 0 /100 WBC
OPIATES UR QL SCN: NEGATIVE
PCP UR QL SCN>25 NG/ML: NEGATIVE
PH UR STRIP: 6 [PH] (ref 5–8)
PLATELET # BLD AUTO: 222 K/UL (ref 150–450)
PMV BLD AUTO: 7.8 FL (ref 9.2–12.9)
POCT GLUCOSE: 106 MG/DL (ref 70–110)
POCT GLUCOSE: 131 MG/DL (ref 70–110)
POTASSIUM SERPL-SCNC: 3.7 MMOL/L (ref 3.5–5.1)
PROT SERPL-MCNC: 6.8 G/DL (ref 6–8.4)
PROT UR QL STRIP: NEGATIVE
RBC # BLD AUTO: 4.19 M/UL (ref 4–5.4)
SALICYLATES SERPL-MCNC: <5 MG/DL (ref 15–30)
SODIUM SERPL-SCNC: 138 MMOL/L (ref 136–145)
SP GR UR STRIP: 1.01 (ref 1–1.03)
TOXICOLOGY INFORMATION: NORMAL
URN SPEC COLLECT METH UR: NORMAL
UROBILINOGEN UR STRIP-ACNC: NEGATIVE EU/DL
WBC # BLD AUTO: 7.91 K/UL (ref 3.9–12.7)

## 2022-07-11 PROCEDURE — 63600175 PHARM REV CODE 636 W HCPCS: Performed by: PHYSICIAN ASSISTANT

## 2022-07-11 PROCEDURE — 93010 ELECTROCARDIOGRAM REPORT: CPT | Mod: ,,, | Performed by: INTERNAL MEDICINE

## 2022-07-11 PROCEDURE — 80053 COMPREHEN METABOLIC PANEL: CPT | Performed by: EMERGENCY MEDICINE

## 2022-07-11 PROCEDURE — 80307 DRUG TEST PRSMV CHEM ANLYZR: CPT | Performed by: EMERGENCY MEDICINE

## 2022-07-11 PROCEDURE — 99285 EMERGENCY DEPT VISIT HI MDM: CPT | Mod: 25

## 2022-07-11 PROCEDURE — 96374 THER/PROPH/DIAG INJ IV PUSH: CPT

## 2022-07-11 PROCEDURE — 85025 COMPLETE CBC W/AUTO DIFF WBC: CPT | Performed by: EMERGENCY MEDICINE

## 2022-07-11 PROCEDURE — 93010 EKG 12-LEAD: ICD-10-PCS | Mod: ,,, | Performed by: INTERNAL MEDICINE

## 2022-07-11 PROCEDURE — 81003 URINALYSIS AUTO W/O SCOPE: CPT | Mod: 59 | Performed by: EMERGENCY MEDICINE

## 2022-07-11 PROCEDURE — 96361 HYDRATE IV INFUSION ADD-ON: CPT

## 2022-07-11 PROCEDURE — 25000003 PHARM REV CODE 250: Performed by: EMERGENCY MEDICINE

## 2022-07-11 PROCEDURE — 82077 ASSAY SPEC XCP UR&BREATH IA: CPT | Performed by: EMERGENCY MEDICINE

## 2022-07-11 PROCEDURE — 93010 ELECTROCARDIOGRAM REPORT: CPT | Mod: 76,,, | Performed by: INTERNAL MEDICINE

## 2022-07-11 PROCEDURE — 25000003 PHARM REV CODE 250: Performed by: PHYSICIAN ASSISTANT

## 2022-07-11 PROCEDURE — 80143 DRUG ASSAY ACETAMINOPHEN: CPT | Performed by: EMERGENCY MEDICINE

## 2022-07-11 PROCEDURE — 80179 DRUG ASSAY SALICYLATE: CPT | Performed by: EMERGENCY MEDICINE

## 2022-07-11 PROCEDURE — 81025 URINE PREGNANCY TEST: CPT | Performed by: EMERGENCY MEDICINE

## 2022-07-11 PROCEDURE — 93005 ELECTROCARDIOGRAM TRACING: CPT

## 2022-07-11 PROCEDURE — 82962 GLUCOSE BLOOD TEST: CPT | Mod: 91

## 2022-07-11 RX ORDER — TOPIRAMATE 100 MG/1
200 TABLET, FILM COATED ORAL
Status: COMPLETED | OUTPATIENT
Start: 2022-07-11 | End: 2022-07-11

## 2022-07-11 RX ORDER — CYCLOBENZAPRINE HCL 10 MG
10 TABLET ORAL
Status: COMPLETED | OUTPATIENT
Start: 2022-07-11 | End: 2022-07-11

## 2022-07-11 RX ORDER — KETOROLAC TROMETHAMINE 30 MG/ML
15 INJECTION, SOLUTION INTRAMUSCULAR; INTRAVENOUS
Status: COMPLETED | OUTPATIENT
Start: 2022-07-11 | End: 2022-07-11

## 2022-07-11 RX ORDER — TOPIRAMATE 200 MG/1
200 TABLET ORAL NIGHTLY
Qty: 30 TABLET | Refills: 1 | OUTPATIENT
Start: 2022-07-11 | End: 2022-07-11

## 2022-07-11 RX ORDER — TOPIRAMATE 200 MG/1
200 TABLET ORAL NIGHTLY
Qty: 30 TABLET | Refills: 1 | Status: SHIPPED | OUTPATIENT
Start: 2022-07-11 | End: 2022-07-11 | Stop reason: SDUPTHER

## 2022-07-11 RX ORDER — DIPHENHYDRAMINE HYDROCHLORIDE 50 MG/ML
25 INJECTION INTRAMUSCULAR; INTRAVENOUS
Status: COMPLETED | OUTPATIENT
Start: 2022-07-11 | End: 2022-07-11

## 2022-07-11 RX ORDER — TOPIRAMATE 200 MG/1
200 TABLET ORAL NIGHTLY
Qty: 30 TABLET | Refills: 1 | Status: SHIPPED | OUTPATIENT
Start: 2022-07-11 | End: 2022-07-12 | Stop reason: SDUPTHER

## 2022-07-11 RX ADMIN — KETOROLAC TROMETHAMINE 15 MG: 30 INJECTION, SOLUTION INTRAMUSCULAR at 08:07

## 2022-07-11 RX ADMIN — SODIUM CHLORIDE 1000 ML: 0.9 INJECTION, SOLUTION INTRAVENOUS at 09:07

## 2022-07-11 RX ADMIN — DIPHENHYDRAMINE HYDROCHLORIDE 25 MG: 50 INJECTION, SOLUTION INTRAMUSCULAR; INTRAVENOUS at 08:07

## 2022-07-11 RX ADMIN — TOPIRAMATE 200 MG: 100 TABLET, FILM COATED ORAL at 07:07

## 2022-07-11 RX ADMIN — CYCLOBENZAPRINE 10 MG: 10 TABLET, FILM COATED ORAL at 08:07

## 2022-07-11 NOTE — TELEPHONE ENCOUNTER
Patient concerned about possible topamax withdrawals. C/O of twitching/jerking, generalized numbness and feeling dehydrated. Advised to go to ED now per protocol. Patient stated she does not have a ride. Advised to call EMS/911. Patient verbalized understanding. Encounter routed to PCP.    Reason for Disposition   [1] New-onset muscle jerks AND [2] present now    Additional Information   Negative: Sounds like a life-threatening emergency to the triager   Negative: Stiff neck (can't touch chin to chest)   Negative: [1] Muscle rigidity or tightness AND [2] present now    Protocols used: MUSCLE JERKS - TICS - [id number]

## 2022-07-12 ENCOUNTER — TELEPHONE (OUTPATIENT)
Dept: INTERNAL MEDICINE | Facility: CLINIC | Age: 32
End: 2022-07-12
Payer: MEDICARE

## 2022-07-12 ENCOUNTER — PATIENT MESSAGE (OUTPATIENT)
Dept: INTERNAL MEDICINE | Facility: CLINIC | Age: 32
End: 2022-07-12
Payer: MEDICARE

## 2022-07-12 ENCOUNTER — NURSE TRIAGE (OUTPATIENT)
Dept: ADMINISTRATIVE | Facility: CLINIC | Age: 32
End: 2022-07-12
Payer: MEDICARE

## 2022-07-12 RX ORDER — TOPIRAMATE 200 MG/1
200 TABLET ORAL NIGHTLY
Qty: 30 TABLET | Refills: 1 | Status: SHIPPED | OUTPATIENT
Start: 2022-07-12 | End: 2022-08-03

## 2022-07-12 NOTE — ED PROVIDER NOTES
Encounter Date: 7/11/2022    SCRIBE #1 NOTE: I, Cheryl Friedman, am scribing for, and in the presence of,  Terri Sagastume PA-C. I have scribed the following portions of the note - Other sections scribed: HPI, ROS, PE.       History     Chief Complaint   Patient presents with    Headache     EMS called out for a female that has been out of her topomax x3 days and having tremors.     The patient, Patsy Akins, is a 32 y.o. female with past medical history of Bipolar Disorder with Depression (Anxiety Disorder), Hypokalemia, Bipolar Affective, and PTSD who presents to the ED with a chief complaint of muscle spasms onset today prior to arrival. The EMS was called because the patient was out of Topamax 200mg and had been having headaches along with tremors. The patient reports that she was out/stopped taking Topamax about four days ago due to her moving to [place] from [place]. She has an appointment scheduled with Dr. Ramos, PCP on Thursday, who electronically sent a refill of her topamax today.    Pt reports she has been taking topamax for years. She has been compliant with buproprion, trileptal, trintillex daily as prescribed and valium PRN but reports she is unsure if she has missed a few doses in the last couple of days. No recent dosage changes. Denies alcohol or illicit drug use.     The patient states that she has been having intermittent muscle spasms prior to arrival at this ED along with upper right neck/back pressure and tightening. She states having additional symptoms of cough, dry throat, and visual disturbance (seeing red on the way to the ED). The patient also reports feeling anxious. No exacerbating or alleviating factors. Denies abdominal pain, SOB, CP, HI, SI, fever, chills, seizures, dizziness, lightheadedness, nausea, vomiting and other associated factors.The patient is allergic to Lamotrigine.    The history is provided by the patient. No  was used.     Review of  "patient's allergies indicates:   Allergen Reactions    Lamotrigine Hives and Itching     Past Medical History:   Diagnosis Date    Bipolar affective     Bipolar disorder with depression     Anxiety disorder    Chronic back pain     Hypokalemia     Irritable bowel syndrome      History reviewed. No pertinent surgical history.  Family History   Problem Relation Age of Onset    Mental illness Mother     Alcohol abuse Mother     Depression Mother     Alcohol abuse Father     Depression Father     Alcohol abuse Maternal Aunt     Alcohol abuse Maternal Grandmother     Arthritis Maternal Grandmother     Stroke Maternal Grandmother     Asthma Brother     Depression Brother     Learning disabilities Brother     Mental illness Brother     Depression Paternal Grandmother     Diabetes Paternal Grandmother     Stroke Paternal Grandmother     Mental illness Paternal Aunt     Miscarriages / Stillbirths Paternal Aunt     Stroke Paternal Grandfather      Social History     Tobacco Use    Smoking status: Never Smoker    Smokeless tobacco: Never Used    Tobacco comment: Don't like it.   Substance Use Topics    Alcohol use: Not Currently     Alcohol/week: 0.0 standard drinks     Comment: Don't drink.    Drug use: No     Review of Systems   Constitutional: Negative for chills, diaphoresis and fever.   HENT: Positive for sore throat ("dry"). Negative for congestion, ear pain, nosebleeds, rhinorrhea and trouble swallowing.    Eyes: Positive for visual disturbance ("seeing red"). Negative for photophobia and redness.   Respiratory: Positive for cough. Negative for shortness of breath and stridor.    Cardiovascular: Negative for chest pain and leg swelling.   Gastrointestinal: Negative for abdominal pain, blood in stool, constipation, diarrhea, nausea and vomiting.   Genitourinary: Negative for decreased urine volume, dysuria, flank pain, frequency, hematuria and urgency.   Musculoskeletal: Positive for neck " pain (posterior, pressure, tightness). Negative for back pain and neck stiffness.   Skin: Negative for rash and wound.   Neurological: Positive for tremors and headaches. Negative for dizziness, seizures, speech difficulty, weakness, light-headedness and numbness.   Psychiatric/Behavioral: Negative for confusion, self-injury and suicidal ideas. The patient is nervous/anxious.        Physical Exam     Initial Vitals [07/11/22 1738]   BP Pulse Resp Temp SpO2   136/84 88 18 98.2 °F (36.8 °C) 98 %      MAP       --         Physical Exam    Nursing note and vitals reviewed.  Constitutional: Vital signs are normal. She appears well-developed and well-nourished. She is not diaphoretic.  Non-toxic appearance. No distress.   HENT:   Head: Normocephalic and atraumatic.   Right Ear: External ear normal.   Left Ear: External ear normal.   Mouth/Throat: Uvula is midline, oropharynx is clear and moist and mucous membranes are normal.   Eyes: Conjunctivae and EOM are normal. Pupils are equal, round, and reactive to light. Right eye exhibits no discharge. Left eye exhibits no discharge. No scleral icterus.   Neck: Neck supple. No tracheal deviation present.   Normal range of motion.  Cardiovascular: Normal rate and regular rhythm. Exam reveals no gallop and no friction rub.    No murmur heard.  Pulmonary/Chest: No stridor. No respiratory distress. She has no wheezes. She has no rhonchi. She has no rales. She exhibits no tenderness.   Abdominal: Abdomen is soft. Bowel sounds are normal. She exhibits no distension. There is no abdominal tenderness. There is no rebound and no guarding.   Musculoskeletal:         General: Normal range of motion.      Cervical back: Normal range of motion and neck supple. No rigidity. No spinous process tenderness.      Comments: Intermittent involuntary spasms of the right shoulder.      Neurological: She is alert. She has normal strength. No sensory deficit. She displays a negative Romberg sign.    Skin: Skin is warm and dry. No rash noted. No erythema.   Psychiatric: Her behavior is normal. Judgment and thought content normal. Her mood appears anxious. She expresses no homicidal and no suicidal ideation. She expresses no suicidal plans and no homicidal plans.         ED Course   Procedures  Labs Reviewed   CBC W/ AUTO DIFFERENTIAL - Abnormal; Notable for the following components:       Result Value    Hematocrit 36.8 (*)     MPV 7.8 (*)     All other components within normal limits   SALICYLATE LEVEL - Abnormal; Notable for the following components:    Salicylate Lvl <5.0 (*)     All other components within normal limits   ACETAMINOPHEN LEVEL - Abnormal; Notable for the following components:    Acetaminophen (Tylenol), Serum <3.0 (*)     All other components within normal limits   POCT GLUCOSE - Abnormal; Notable for the following components:    POCT Glucose 131 (*)     All other components within normal limits   COMPREHENSIVE METABOLIC PANEL   URINALYSIS, REFLEX TO URINE CULTURE    Narrative:     Specimen Source->Urine   DRUG SCREEN PANEL, URINE EMERGENCY    Narrative:     Specimen Source->Urine   ALCOHOL,MEDICAL (ETHANOL)   POCT URINE PREGNANCY   POCT GLUCOSE     EKG Readings: (Independently Interpreted)   Initial Reading: No STEMI. Rhythm: Normal Sinus Rhythm. Heart Rate: 100. Ectopy: No Ectopy. Conduction: Normal. Axis: Right Axis Deviation. Other Impression:      ECG Results          EKG 12-lead (Final result)  Result time 07/12/22 18:14:46    Final result by Interface, Lab In [facility] (07/12/22 18:14:46)                 Narrative:    Test Reason : R53.83,    Vent. Rate : 150 BPM     Atrial Rate : 150 BPM     P-R Int : 104 ms          QRS Dur : 076 ms      QT Int : 332 ms       P-R-T Axes : 076 077 066 degrees     QTc Int : 524 ms    Sinus tachycardia with short NJ  Nonspecific ST and T wave abnormality  Abnormal ECG  When compared with ECG of 11-JUL-2022 21:03,  No significant change was  found  Confirmed by Joe Cole MD ([id number]) on 7/12/2022 6:14:39 PM    Referred By: [email or web address]   SELF           Confirmed By:Joe Cole MD                  ED Interpretation by Erica Hernandez MD (07/11/22 21:59:19, [facility] Emergency Dept, Emergency Medicine)    Sinus tachycardia, rate 138 beats per minute, normal VT interval,  milliseconds.  No STEMI.                             EKG 12-lead (Final result)  Result time 07/12/22 18:14:44    Final result by Almita, Lab In [facility] (07/12/22 18:14:44)                 Narrative:    Test Reason : R00.0,    Vent. Rate : 138 BPM     Atrial Rate : 138 BPM     P-R Int : 096 ms          QRS Dur : 082 ms      QT Int : 374 ms       P-R-T Axes : 000 087 065 degrees     QTc Int : 566 ms    Sinus tachycardia with short VT  Nonspecific ST abnormality  Abnormal ECG  When compared with ECG of 01-AUG-2020 23:23,  Significant changes have occurred  Confirmed by Joe Cole MD ([id number]) on 7/12/2022 6:14:35 PM    Referred By: AAAREFERR   SELF           Confirmed By:Joe Cole MD                  ED Interpretation by Erica Hernandez MD (07/11/22 21:59:44, [facility] Emergency Dept, Emergency Medicine)    Sinus tachycardia, rate 138 beats per minute,  milliseconds.                            Imaging Results          CT Head Without Contrast (Final result)  Result time 07/11/22 21:49:38    Final result by Chacha Krueger MD (07/11/22 21:49:38)                 Impression:      No acute intracranial abnormality detected.      Electronically signed by: Chacha Krueger  Date:    07/11/2022  Time:    21:49             Narrative:    EXAMINATION:  CT OF THE HEAD WITHOUT    CLINICAL HISTORY:  Seizure, new-onset, no history of trauma;    TECHNIQUE:  5 mm unenhanced axial images were obtained from the skull base to the vertex.    COMPARISON:  None.    FINDINGS:  The ventricles, basal cisterns, and cortical sulci are within normal limits for patient's stated age.  There is no acute intracranial hemorrhage, territorial infarct or mass effect, or midline shift. The visualized paranasal sinuses and mastoid air cells are clear.                                 Medications   topiramate tablet 200 mg (200 mg Oral Given 7/11/22 1936)   diphenhydrAMINE injection 25 mg (25 mg Intravenous Given 7/11/22 2035)   cyclobenzaprine tablet 10 mg (10 mg Oral Given 7/11/22 2035)   ketorolac injection 15 mg (15 mg Intravenous Given 7/11/22 2035)   sodium chloride 0.9% bolus 1,000 mL (0 mLs Intravenous Stopped 7/11/22 2348)     Medical Decision Making:   History:   Old Medical Records: I decided to obtain old medical records.  Independently Interpreted Test(s):   I have ordered and independently interpreted EKG Reading(s) - see prior notes  Clinical Tests:   Lab Tests: Ordered and Reviewed  Medical Tests: Ordered and Reviewed  ED Management:  32-year-old female with history of bipolar disorder, anxiety presenting for spasms and tremors after running out of topamax 4 days ago.   Exam above. Pt given topamax in ED.     Labs unremarkable.  No significant electrolyte abnormality renal insufficiency to explain patient's spasms.urine drug screen, etoh, salicyclate, apap negative.  EKG with NSR. Pt given iv benadryl and toradol and PO flexeril for symptomatic treatment.   pcp follow up. Return to ER for worsening symptoms rob s needed.           Scribe Attestation:   Scribe #1: I performed the above scribed service and the documentation accurately describes the services I performed. I attest to the accuracy of the note.        ED Course as of 07/14/22 1339   Mon Jul 11, 2022   2314 HR 92, BP 92/51. Case reviewed with Dr. Tamra grissom for discharge home with seizure precautions, topamax refill, follow up in clinic.  [LH]      ED Course User Index  [LH] Erica Hernandez MD             Clinical Impression:   Final diagnoses:  [R53.83] Fatigue  [M62.838] Muscle spasms of neck (Primary)  [R00.0]  Tachycardia  [G43.809] Other migraine without status migrainosus, not intractable  [R56.9] Seizure-like activity          ED Disposition Condition    Discharge Stable       I, Terri Sagastume PA-C personally performed the services described in this documentation. All medical record entries made by the scribe were at my direction and in my presence. I have reviewed the chart and agree that the record reflects my personal performance and is accurate and complete.  ED Prescriptions     Medication Sig Dispense Start Date End Date Auth. Provider    topiramate (TOPAMAX) 200 MG Tab  (Status: Discontinued) Take 1 tablet (200 mg total) by mouth every evening. 30 tablet 2022 Erica Hernandez MD    topiramate (TOPAMAX) 200 MG Tab () Take 1 tablet (200 mg total) by mouth every evening. 30 tablet 2022 Erica Hernandez MD        Follow-up Information     Follow up With Specialties Details Why Contact Info    Ghanshyam Ramos MD Internal Medicine Schedule an appointment as soon as possible for a visit in 2 days for follow up [address]  [place] [address]  [phone]      [facility] Emergency Dept Emergency Medicine Go to  As needed, If symptoms worsen [address] [facility] [address] [phone]    Gil Escalante MD Neurology Schedule an appointment as soon as possible for a visit   [address]  [address]  [facility] [address]  [phone]             Terri Sagastume PA-C  [phone]       Erica Hernandez MD  [phone]

## 2022-07-12 NOTE — TELEPHONE ENCOUNTER
----- Message from Mattie Viera sent at 7/12/2022  9:19 AM CDT -----  Contact: PT - [phone]  Caller:  PT - [phone]    Reason:  requesting refill - still waiting -   topiramate (TOPAMAX) 200 MG Tab 30 tablet       [facility] [place] [id number] [facility]  [place] [address]  Phone: [phone] Fax: [phone]

## 2022-07-12 NOTE — ED TRIAGE NOTES
Pt presents to the ED via EMS  Pt reports calling EMS because she has been out of her Topomax for the last 3 days  Pt states that she is having tremors  Pt denies any other symptoms

## 2022-07-12 NOTE — ED PROVIDER NOTES
Encounter Date: 7/11/2022    SCRIBE #1 NOTE: I, Oneida Simms, am scribing for, and in the presence of,  Erica Hernandez MD. I have scribed the following portions of the note - Other sections scribed: HPI, ROS, PE.       History     Chief Complaint   Patient presents with    Headache     EMS called out for a female that has been out of her topomax x3 days and having tremors.     Time seen by provider: 8:55 PM.    Patsy Akins is a 32 y.o. female, with a PMHx of Bipolar Disorder with Depression, anxiety, Hypokalemia, Bipolar Affective, and PTSD, who presents to the ED following a witnessed seizure. Patient was just discharged from this ED for an evaluation of headache , muscle spasms, and tremors, and after running out of her Topamax medications for 4 days. She was just given cyclobenzene 10 mg, benadryl 25 mg, Toradol 15 mg at 8:35 PM. She was given Topamax 200 mg at 7:36 PM. Following her discharge in the ED, she was found actively seizing outside the ED, and a bystander prevented her from falling to the ground. ED staff witnessed her seizing. This is the extent of the patient's history today in the Emergency Department.       After observing patient in ED she returned to normal mental status. She states she has been on Topamax for many years and confirms she ran out of it several days ago. She reports feeling improved, she has an appointment with her PCP on Thursday.     The history is limited by the condition of the patient.     Review of patient's allergies indicates:   Allergen Reactions    Lamotrigine Hives and Itching     Past Medical History:   Diagnosis Date    Bipolar affective     Bipolar disorder with depression     Anxiety disorder    Chronic back pain     Hypokalemia     Irritable bowel syndrome      History reviewed. No pertinent surgical history.  Family History   Problem Relation Age of Onset    Mental illness Mother     Alcohol abuse Mother     Depression Mother     Alcohol abuse  Father     Depression Father     Alcohol abuse Maternal Aunt     Alcohol abuse Maternal Grandmother     Arthritis Maternal Grandmother     Stroke Maternal Grandmother     Asthma Brother     Depression Brother     Learning disabilities Brother     Mental illness Brother     Depression Paternal Grandmother     Diabetes Paternal Grandmother     Stroke Paternal Grandmother     Mental illness Paternal Aunt     Miscarriages / Stillbirths Paternal Aunt     Stroke Paternal Grandfather      Social History     Tobacco Use    Smoking status: Never Smoker    Smokeless tobacco: Never Used    Tobacco comment: Don't like it.   Substance Use Topics    Alcohol use: Not Currently     Alcohol/week: 0.0 standard drinks     Comment: Don't drink.    Drug use: No     Review of Systems   Unable to perform ROS: Acuity of condition       Physical Exam     Initial Vitals [07/11/22 1738]   BP Pulse Resp Temp SpO2   136/84 88 18 98.2 °F (36.8 °C) 98 %      MAP       --         Physical Exam    Nursing note and vitals reviewed.  Constitutional: She appears well-developed and well-nourished. She is not diaphoretic. No distress.   HENT:   Mouth/Throat: Oropharynx is clear and moist.   Eyes: Conjunctivae are normal. Pupils are equal, round, and reactive to light.   No pinpoint pupils.   Neck: Neck supple.   Cardiovascular: Regular rhythm. Tachycardia present.    Pulmonary/Chest: Breath sounds normal.   Abdominal: Abdomen is soft. There is no abdominal tenderness.   Musculoskeletal:         General: No edema.      Cervical back: Neck supple.     Neurological: She is alert.   Postictal, moves all four extremities.    Skin: Skin is warm and dry.         ED Course   Procedures  Labs Reviewed   CBC W/ AUTO DIFFERENTIAL - Abnormal; Notable for the following components:       Result Value    Hematocrit 36.8 (*)     MPV 7.8 (*)     All other components within normal limits   SALICYLATE LEVEL - Abnormal; Notable for the following  components:    Salicylate Lvl <5.0 (*)     All other components within normal limits   ACETAMINOPHEN LEVEL - Abnormal; Notable for the following components:    Acetaminophen (Tylenol), Serum <3.0 (*)     All other components within normal limits   POCT GLUCOSE - Abnormal; Notable for the following components:    POCT Glucose 131 (*)     All other components within normal limits   COMPREHENSIVE METABOLIC PANEL   URINALYSIS, REFLEX TO URINE CULTURE    Narrative:     Specimen Source->Urine   DRUG SCREEN PANEL, URINE EMERGENCY    Narrative:     Specimen Source->Urine   ALCOHOL,MEDICAL (ETHANOL)   POCT URINE PREGNANCY   POCT GLUCOSE   POCT GLUCOSE MONITORING CONTINUOUS        ECG Results          EKG 12-lead (Preliminary result)  Result time 07/11/22 21:59:44    ED Interpretation by Erica Hernandez MD (07/11/22 21:59:44, [facility] Emergency Dept, Emergency Medicine)    Sinus tachycardia, rate 138 beats per minute,  milliseconds.                             EKG 12-lead (Preliminary result)  Result time 07/11/22 21:59:19    ED Interpretation by Erica Hernandez MD (07/11/22 21:59:19, [facility] Emergency Dept, Emergency Medicine)    Sinus tachycardia, rate 138 beats per minute, normal IN interval,  milliseconds.  No STEMI.                            Imaging Results          CT Head Without Contrast (Final result)  Result time 07/11/22 21:49:38    Final result by Chacha Krueger MD (07/11/22 21:49:38)                 Impression:      No acute intracranial abnormality detected.      Electronically signed by: Chacha Krueger  Date:    07/11/2022  Time:    21:49             Narrative:    EXAMINATION:  CT OF THE HEAD WITHOUT    CLINICAL HISTORY:  Seizure, new-onset, no history of trauma;    TECHNIQUE:  5 mm unenhanced axial images were obtained from the skull base to the vertex.    COMPARISON:  None.    FINDINGS:  The ventricles, basal cisterns, and cortical sulci are within normal limits for patient's  stated age. There is no acute intracranial hemorrhage, territorial infarct or mass effect, or midline shift. The visualized paranasal sinuses and mastoid air cells are clear.                                 Medications   topiramate tablet 200 mg (200 mg Oral Given 7/11/22 1936)   diphenhydrAMINE injection 25 mg (25 mg Intravenous Given 7/11/22 2035)   cyclobenzaprine tablet 10 mg (10 mg Oral Given 7/11/22 2035)   ketorolac injection 15 mg (15 mg Intravenous Given 7/11/22 2035)   sodium chloride 0.9% bolus 1,000 mL (0 mLs Intravenous Stopped 7/11/22 2348)     Medical Decision Making:   History:   Old Medical Records: I decided to obtain old medical records.  Initial Assessment:   32-year-old female presents after witnessed seizure-like activity.  The patient was evaluated in this department for muscle spasms, she has been out of her Topamax for 4 days.  The patient had labs that were drawn which are within acceptable limits.  She was treated with Topamax, Benadryl and Flexeril.  She was discharged.  After discharge, she had a witnessed seizure episode in the lobby.  The patient was emergently brought back to in ER room.  The patient appeared to be postictal and was tachycardic and hypotensive.  She is moving all 4 extremities, do not appreciate any evidence of head trauma.  I reviewed her labs, within acceptable limits.  An Accu-Chek was obtained and found to be 131.  The patient will get an EKG, CT head, IV fluids.  Suspect symptoms could be related to Topamax withdrawal.   Independently Interpreted Test(s):   I have ordered and independently interpreted EKG Reading(s) - see prior notes  Clinical Tests:   Lab Tests: Ordered and Reviewed  Radiological Study: Ordered and Reviewed  Medical Tests: Ordered and Reviewed  ED Management:  On reassessment, patient reports feeling improved denies complaints.  She confirms she has follow-up on Thursday.  I will provide her physical prescription for Topamax refill.  I reviewed  seizure precautions with the patient.          Scribe Attestation:   Scribe #1: I performed the above scribed service and the documentation accurately describes the services I performed. I attest to the accuracy of the note.        ED Course as of 07/12/22 0002   Mon Jul 11, 2022   2314 HR 92, BP 92/51. Case reviewed with Dr. Tamra grissom for discharge home with seizure precautions, topamax refill, follow up in clinic.  [LH]      ED Course User Index  [LH] Erica Hernandez MD             Clinical Impression:   Final diagnoses:  [R53.83] Fatigue  [M62.838] Muscle spasms of neck (Primary)  [R00.0] Tachycardia  [G43.809] Other migraine without status migrainosus, not intractable  [R56.9] Seizure-like activity       I, Erica Hernandez MD, personally performed the services described in this documentation. All medical record entries made by the scribe were at my direction and in my presence. I have reviewed the chart and agree that the record reflects my personal performance and is accurate and complete.     ED Disposition Condition    Discharge Stable        ED Prescriptions     Medication Sig Dispense Start Date End Date Auth. Provider    topiramate (TOPAMAX) 200 MG Tab  (Status: Discontinued) Take 1 tablet (200 mg total) by mouth every evening. 30 tablet 7/11/2022 7/11/2022 Erica Hernandez MD    topiramate (TOPAMAX) 200 MG Tab Take 1 tablet (200 mg total) by mouth every evening. 30 tablet 7/11/2022  Erica Hernandez MD        Follow-up Information     Follow up With Specialties Details Why Contact Info    Ghanshyam Ramos MD Internal Medicine Schedule an appointment as soon as possible for a visit in 2 days for follow up [address]  [facility] [address]  [phone]      [facility] Emergency Dept Emergency Medicine Go to  As needed, If symptoms worsen [address] [facility] [address] [phone]    Gil Escalante MD Neurology Schedule an appointment as soon as possible for a  visit   [address]  [address]  [facility] [address]  [phone]             Erica Hernandez MD  07/12/22 0002

## 2022-07-12 NOTE — ED TRIAGE NOTES
Pt just discharged from hospital for withdrawals from a home medication. Bystander preventing pt from falling a pt had a seizure. Unknown down time.

## 2022-07-12 NOTE — TELEPHONE ENCOUNTER
Topamax came from an outside doctor who would not refill without a visit. I sent a Rx last night but maybe she did not get it. She can restart or see her prior doctor that was prescribing and treat other symptoms. Likely needs a follow up with me anyway.

## 2022-07-12 NOTE — TELEPHONE ENCOUNTER
----- Message from Patsy Zaragoza sent at 7/12/2022  3:01 PM CDT -----  Contact: [phone] Patient  Requesting an RX refill or new RX.  Is this a refill or new RX: new  RX name and strength (copy/paste from chart):  topiramate (TOPAMAX) 200 MG Tab  Is this a 30 day or 90 day RX: 90  Pharmacy name and phone # (copy/paste from chart):    [facility] - [place] - [id number] [facility]  [place] [address]  Phone: [phone] Fax  Pt was advised to contact her PCP by the Er last night. Pt states she is out of medication and having withdraws.

## 2022-07-12 NOTE — ED NOTES
Pt found actively seizing in parking lot. Placed on stretcher. Placed on stretcher and brought to room 7. MD at bedside.

## 2022-07-12 NOTE — TELEPHONE ENCOUNTER
Patient recently seen in the ED for topomax withdrawals. Symptoms of withdrawals were back spasms and neck pain. Patient was given flexeril and torodal in the ED. Patient has questions about what she can take at home for theses symptoms. Patient advised to take tylenol or ibuprofen for symptoms. All questions and concerns were addressed. Advised the patient to call back with any further questions or if symptoms worsen.      Reason for Disposition   Caller has already spoken with another triager or PCP (or office), and has further questions and triager able to answer questions.    Protocols used: NO CONTACT OR DUPLICATE CONTACT CALL-A-[place]

## 2022-07-12 NOTE — DISCHARGE INSTRUCTIONS
Dr. Ramos sent a refill of your medication to [facility] on [address]     Follow up with your primary care.   Return to ER for worsening symptoms or as needed    Seizure precautions: Please do not to swim unattended, operate motor vehicles or other machinery, cook over an open flame, or hold small children or infants while standing. Avoid heights or other areas where falls may occur until cleared by primary care physician.

## 2022-08-11 ENCOUNTER — TELEPHONE (OUTPATIENT)
Dept: INTERNAL MEDICINE | Facility: CLINIC | Age: 32
End: 2022-08-11
Payer: MEDICARE

## 2022-08-11 ENCOUNTER — TELEPHONE (OUTPATIENT)
Dept: NEUROLOGY | Facility: CLINIC | Age: 32
End: 2022-08-11
Payer: MEDICARE

## 2022-08-11 ENCOUNTER — PATIENT MESSAGE (OUTPATIENT)
Dept: INTERNAL MEDICINE | Facility: CLINIC | Age: 32
End: 2022-08-11
Payer: MEDICARE

## 2022-08-11 NOTE — TELEPHONE ENCOUNTER
----- Message from Nicolette Lange MA sent at 8/11/2022 12:17 PM CDT -----  Contact: Patient    ----- Message -----  From: Beatriz Merritt  Sent: 8/11/2022  11:19 AM CDT  To: Richard ALONZO Staff    Patient call in regarding prescription topiramate (TOPAMAX) 200 MG Tab    Patient requesting advice regarding prescription not been covered and been charge 83.00    Patient requesting advice which insurance she should contact       Please advice    Patient can be reach at   [phone]

## 2022-08-11 NOTE — TELEPHONE ENCOUNTER
Spoke with patient and [facility] pharmacist regarding message related to medication Topamax    Patient reports having medicare and medicaid insurance and having to pay $83 for medication this month but she's never paid this amount in the past   [facility] pharmacist reports noting having Medicaid card on file for patient    Patient ask to bring all insurance cards to [facility] as requested by [facility] pharmacist

## 2022-08-11 NOTE — TELEPHONE ENCOUNTER
Spoke with patient earlier regarding message, patient asked to bring all insurance cards to her local pharmacy as requested by [facility]  Patient reports she will contact the pharmacy

## 2022-10-03 DIAGNOSIS — Z71.89 COMPLEX CARE COORDINATION: ICD-10-CM

## 2022-10-06 ENCOUNTER — PES CALL (OUTPATIENT)
Dept: ADMINISTRATIVE | Facility: CLINIC | Age: 32
End: 2022-10-06
Payer: MEDICARE

## 2022-10-26 ENCOUNTER — PES CALL (OUTPATIENT)
Dept: ADMINISTRATIVE | Facility: CLINIC | Age: 32
End: 2022-10-26
Payer: MEDICARE

## 2022-10-28 ENCOUNTER — PES CALL (OUTPATIENT)
Dept: ADMINISTRATIVE | Facility: CLINIC | Age: 32
End: 2022-10-28
Payer: MEDICARE

## 2022-11-15 ENCOUNTER — PATIENT OUTREACH (OUTPATIENT)
Dept: ADMINISTRATIVE | Facility: HOSPITAL | Age: 32
End: 2022-11-15
Payer: MEDICARE

## 2022-11-22 ENCOUNTER — OFFICE VISIT (OUTPATIENT)
Dept: INTERNAL MEDICINE | Facility: CLINIC | Age: 32
End: 2022-11-22
Payer: MEDICARE

## 2022-11-22 VITALS
DIASTOLIC BLOOD PRESSURE: 62 MMHG | WEIGHT: 112.44 LBS | SYSTOLIC BLOOD PRESSURE: 108 MMHG | OXYGEN SATURATION: 96 % | HEIGHT: 63 IN | HEART RATE: 90 BPM | BODY MASS INDEX: 19.92 KG/M2

## 2022-11-22 DIAGNOSIS — M54.9 UPPER BACK PAIN: ICD-10-CM

## 2022-11-22 DIAGNOSIS — M54.9 CHRONIC BACK PAIN, UNSPECIFIED BACK LOCATION, UNSPECIFIED BACK PAIN LATERALITY: Primary | ICD-10-CM

## 2022-11-22 DIAGNOSIS — G89.29 CHRONIC BACK PAIN, UNSPECIFIED BACK LOCATION, UNSPECIFIED BACK PAIN LATERALITY: Primary | ICD-10-CM

## 2022-11-22 DIAGNOSIS — M54.2 NECK PAIN: ICD-10-CM

## 2022-11-22 DIAGNOSIS — K21.9 GASTROESOPHAGEAL REFLUX DISEASE WITHOUT ESOPHAGITIS: ICD-10-CM

## 2022-11-22 DIAGNOSIS — I10 ESSENTIAL HYPERTENSION: ICD-10-CM

## 2022-11-22 PROCEDURE — 99214 PR OFFICE/OUTPT VISIT, EST, LEVL IV, 30-39 MIN: ICD-10-PCS | Mod: S$PBB,,, | Performed by: INTERNAL MEDICINE

## 2022-11-22 PROCEDURE — 99214 OFFICE O/P EST MOD 30 MIN: CPT | Mod: PBBFAC | Performed by: INTERNAL MEDICINE

## 2022-11-22 PROCEDURE — 99214 OFFICE O/P EST MOD 30 MIN: CPT | Mod: S$PBB,,, | Performed by: INTERNAL MEDICINE

## 2022-11-22 PROCEDURE — 99999 PR PBB SHADOW E&M-EST. PATIENT-LVL IV: CPT | Mod: PBBFAC,,, | Performed by: INTERNAL MEDICINE

## 2022-11-22 PROCEDURE — 99999 PR PBB SHADOW E&M-EST. PATIENT-LVL IV: ICD-10-PCS | Mod: PBBFAC,,, | Performed by: INTERNAL MEDICINE

## 2022-11-22 RX ORDER — ACETAMINOPHEN 500 MG
TABLET ORAL
COMMUNITY

## 2022-11-22 NOTE — PROGRESS NOTES
Subjective:       Patient ID: Patsy Akins is a 32 y.o. female.    Chief Complaint: Annual Exam    Patient in for annual follow-up medical problems.  She said in general she has been feeling better, better mood.  Moved out of the group home and now has some privacy in her own apartment.    Medications are stable, we reviewed those.  She denies any problems or side effects.  She was supposed to have a Neurology follow-up but had transportation issues.  She is trying to sort that out and reschedule.  She has been having some upper back and lower neck issues.  She would like to try physical therapy.  I explained that we can assist her with an appointment.    She had labs earlier in the year at an ER visit.  I reviewed those and they were stable.  She will make her own gyn appointment as well.    She would like a flu shot    Review of Systems   Constitutional:  Positive for fatigue. Negative for fever.   Musculoskeletal:  Positive for arthralgias, back pain and neck pain.   Neurological:  Positive for headaches (Somewhat improved).   Psychiatric/Behavioral:  Positive for decreased concentration. The patient is nervous/anxious.          Past Medical History:   Diagnosis Date    Bipolar affective     Bipolar disorder with depression     Anxiety disorder    Chronic back pain     Hypokalemia     Irritable bowel syndrome      No past surgical history on file.   Patient Active Problem List   Diagnosis    Bipolar 1 disorder, depressed    Gastroesophageal reflux disease without esophagitis    Essential hypertension    Chronic back pain        Objective:      Physical Exam  Constitutional:       General: She is not in acute distress.     Appearance: She is well-developed.   HENT:      Head: Normocephalic and atraumatic.      Right Ear: Tympanic membrane, ear canal and external ear normal.      Left Ear: Tympanic membrane, ear canal and external ear normal.      Mouth/Throat:      Pharynx: No oropharyngeal exudate or posterior  oropharyngeal erythema.   Eyes:      General: No scleral icterus.     Conjunctiva/sclera: Conjunctivae normal.      Pupils: Pupils are equal, round, and reactive to light.   Neck:      Thyroid: No thyromegaly.   Cardiovascular:      Rate and Rhythm: Normal rate and regular rhythm.      Pulses: Normal pulses.      Heart sounds: No murmur heard.  Pulmonary:      Effort: Pulmonary effort is normal.      Breath sounds: Normal breath sounds. No wheezing.   Abdominal:      General: Bowel sounds are normal. There is no distension.      Palpations: Abdomen is soft.      Tenderness: There is no abdominal tenderness.   Musculoskeletal:         General: Tenderness (mild upper back but no radiation down the arms) present.      Cervical back: Normal range of motion and neck supple. Tenderness (mild) present.      Right lower leg: No edema.      Left lower leg: No edema.   Lymphadenopathy:      Cervical: No cervical adenopathy.   Skin:     Coloration: Skin is not jaundiced.      Findings: No rash.   Neurological:      General: No focal deficit present.      Mental Status: She is alert and oriented to person, place, and time.   Psychiatric:         Mood and Affect: Mood normal.         Behavior: Behavior normal.       Assessment:       Problem List Items Addressed This Visit          Cardiac/Vascular    Essential hypertension       GI    Gastroesophageal reflux disease without esophagitis       Orthopedic    Chronic back pain - Primary     Other Visit Diagnoses       Upper back pain        Relevant Orders    Ambulatory referral/consult to Physical/Occupational Therapy    Neck pain        Relevant Orders    Ambulatory referral/consult to Physical/Occupational Therapy              Plan:         Patsy was seen today for annual exam.    Diagnoses and all orders for this visit:    Chronic back pain, unspecified back location, unspecified back pain laterality    Upper back pain  -     Ambulatory referral/consult to  "Physical/Occupational Therapy; Future    Neck pain  -     Ambulatory referral/consult to Physical/Occupational Therapy; Future    Essential hypertension    Gastroesophageal reflux disease without esophagitis             Continue meds, flu shot   Follow-up in 6 months sooner p.r.n.      Portions of this note may have been created with voice recognition software. Occasional "wrong-word" or "sound-a-like" substitutions may have occurred due to the inherent limitations of voice recognition software. Please, read the note carefully and recognize, using context, where substitutions have occurred.    "

## 2022-12-14 ENCOUNTER — PATIENT MESSAGE (OUTPATIENT)
Dept: INTERNAL MEDICINE | Facility: CLINIC | Age: 32
End: 2022-12-14
Payer: MEDICARE

## 2022-12-14 DIAGNOSIS — G43.909 MIGRAINE WITHOUT STATUS MIGRAINOSUS, NOT INTRACTABLE, UNSPECIFIED MIGRAINE TYPE: Primary | ICD-10-CM

## 2022-12-14 RX ORDER — TOPIRAMATE 200 MG/1
200 TABLET ORAL NIGHTLY
Qty: 30 TABLET | Refills: 1 | Status: SHIPPED | OUTPATIENT
Start: 2022-12-14 | End: 2023-01-30

## 2022-12-19 ENCOUNTER — CLINICAL SUPPORT (OUTPATIENT)
Dept: REHABILITATION | Facility: OTHER | Age: 32
End: 2022-12-19
Payer: MEDICARE

## 2022-12-19 DIAGNOSIS — R29.3 POOR POSTURE: ICD-10-CM

## 2022-12-19 DIAGNOSIS — R68.89 DECREASED FUNCTIONAL ACTIVITY TOLERANCE: ICD-10-CM

## 2022-12-19 DIAGNOSIS — M54.2 NECK PAIN: ICD-10-CM

## 2022-12-19 DIAGNOSIS — M54.9 UPPER BACK PAIN: ICD-10-CM

## 2022-12-19 PROCEDURE — 97530 THERAPEUTIC ACTIVITIES: CPT | Mod: PN

## 2022-12-19 PROCEDURE — 97161 PT EVAL LOW COMPLEX 20 MIN: CPT | Mod: PN

## 2022-12-20 PROBLEM — R68.89 DECREASED FUNCTIONAL ACTIVITY TOLERANCE: Status: ACTIVE | Noted: 2022-12-20

## 2022-12-20 PROBLEM — R29.3 POOR POSTURE: Status: ACTIVE | Noted: 2022-12-20

## 2022-12-20 NOTE — PLAN OF CARE
[facility]  Physical Therapy Initial Evaluation  Date: 12/19/2022   Name: Patsy Akins  Clinic Number: [id number]    Therapy Diagnosis:   Encounter Diagnoses   Name Primary?    Upper back pain     Neck pain     Decreased functional activity tolerance     Poor posture      Physician: Ghanshyam Ramos MD    Physician Orders: PT Eval and Treat   Medical Diagnosis from Referral: Upper back pain [M54.9], Neck pain [M54.2]  Evaluation Date: 12/19/2022  Authorization Period Expiration: 11/22/2023  Plan of Care Expiration: 02/07/2023  Visit # / Visits authorized: 1/ 1   Progress Note Due: 01/19/2002  FOTO: 1/ 1    Precautions: Standard, PTSD, Depression, seizures, bipolar disorder     Time In: 3:15 pm  Time Out: 4:00 pm  Total Appointment Time (timed & untimed codes): 45 minutes    Subjective   Date of onset: 1 year ago   History of current condition - PATSY reports: she has felt like she has gotten weaker and weaker over the last year. She states that she knows her posture is not the best and she be lives this has caused her to have muscle spasms in her upper back and neck. The spasms have become so bad that it has gotten increasingly difficult for her to exercise and complete house chores that involve lifting. She states that she recently had a seizure where she fell and she believes that that injured her upper back even more. She denies any numbness and tingling.     NICHOLAS: insidious   Any Bowel and Bladder movement issues: no   Any falls: seizure   Any dizziness: occasionally    Any Headache: no  Any injection in lower back: steroid or epidural: no   Pain radiates: localized   Pain constant or intermitting: constant     Pain:  Current 6/10, worst 8/10, best 4/10   Location: thoracic spine, cervical spine   Description: Grabbing and Tight  Aggravating Factors: sitting upright   Easing Factors: positional changes     Prior Therapy: None for current compliant   Social History: lives alone    Occupation: Unemployed   Prior Level of Function: independent with all ADL's with no increases in pain   Current Level of Function: inability to lifting garbage bags, lifting laundry, difficulty sleeping, inability to exercises     Pts goals: get back to exercising     Imaging:  None for current compliant        Medical History:   Past Medical History:   Diagnosis Date    Bipolar affective     Bipolar disorder with depression     Anxiety disorder    Chronic back pain     Hypokalemia     Irritable bowel syndrome        Surgical History:   Patsy Akins  has no past surgical history on file.    Medications:   Patsy has a current medication list which includes the following prescription(s): bupropion, hydroxyzine hcl, lorazepam, melatonin, omeprazole, oxcarbazepine, potassium chloride sa, topiramate, and trintellix.    Allergies:   Review of patient's allergies indicates:   Allergen Reactions    Lamotrigine Hives and Itching      Objective   Observation: Pt ambulates into clinic with independence and no AD.     Posture Alignment: slouched posture;forward head  Sensation: intact to light touch    DTR: 2+    Cervical Range of Motion:    Degrees Pain   Flexion 71 -     Extension 58 Pain      Right Side Bending 41 Pain and stiffness     Left Side Bending 45 -     Right Rotation 31 Pain and stiffness   Left Rotation 50 stiffness      Shoulder Range of Motion:   Shoulder Left Right   Flexion WFL WFL   Abduction 95* 95*   ER WFL WFL   IR WFL WFL     Strength:  Cervical MMT   Flexion 4+/5   Extension 4+/5   Right Side Bend 4+/5   Left Side Bend 4+/5       Lumbar Range of Motion:    %   Flexion WFL     Extension WFL     Left Side Bending WFL   Right Side Bending WFL   Left rotation   WFL   Right Rotation   WFL    *= pain      Lower Extremity Strength    Right LE  Left LE    Hip flexion: 4/5 Hip flexion: 4/5   Knee extension: 4/5 Knee extension: 4/5   Knee flexion: 4/5 Knee flexion: 4/5   Hip IR: 4/5 Hip IR: 4/5   Hip ER:  4/5 Hip ER: 4/5   Hip extension:  4/5 Hip extension: 4/5   Hip abduction: 4/5 Hip abduction: 4/5   Hip adduction: 4/5 Hip adduction 4/5   Ankle dorsiflexion: 4+/5 Ankle dorsiflexion: 4+/5   Ankle plantarflexion: 4+/5 Ankle plantarflexion: 4+/5     Special Tests:  ULTT: Negative for median, ulnar, and radial nerve testing      Joint Mobility: hypomobile C6-T12     Palpation: TTP to bilateral medial boarder of scapula     Flexibility: decreased soft tissue flexibility     PT Evaluation Completed? Yes  Discussed Plan of Care with patient: Yes        CMS Impairment/Limitation/Restriction for FOTO  Survey    Therapist reviewed FOTO scores for Patsy Akins on 12/19/2022.   FOTO documents entered into EPIC - see Media section.    Limitation Score: 54%           TREATMENT   Treatment Time In: 3:45 pm  Treatment Time Out: 4:00 pm  Total Treatment time separate from Evaluation: 15 minutes    participated in dynamic functional therapeutic activities to improve functional performance for 15 minutes, including:    +Postural education  +Education on ergonomic modifications that can be made for home work station   +Pain neuroscience education     Consider initiating postural stabilization exercises, thoracic mobility, erector spinae stabilization exercises next visit      Home Exercises and Patient Education Provided    Education provided:   - HEP, POC  -Patient was educated on initial evaluation findings and expectations as well as future PT services, procedures, and expectations for optimal compliance with therapy     Written Home Exercises Provided: yes.  Exercises were reviewed and PATSY was able to demonstrate them prior to the end of the session.  PATSY demonstrated good  understanding of the education provided.     See EMR under Patient Instructions for exercises provided 12/19/2022.    Assessment   Patsy is a 32 y.o. female referred to outpatient Physical Therapy with a medical diagnosis of Upper back pain [M54.9],  Neck pain [M54.2].  Pt presents with signs and symptoms consistent with diagnosis including weakness of periscapular musculature, decreased joint mobility in thoracic spine, decreased soft tissue flexibility,poor posture and decreased functional mobility tolerance. These deficits are limiting patient in full participation in ADL's and leisure activities such as prolonged sitting, exercising and lifting objects around home. Pt will benefit from skilled outpatient Physical Therapy to address the deficits stated above and in the chart below, provide pt/family education, and to maximize pt's level of independence.    Pt prognosis is Good.   Pt will benefit from skilled outpatient Physical Therapy to address the deficits stated above and in the chart below, provide pt/family education, and to maximize pt's level of independence.     Plan of care discussed with patient: Yes  Pt's spiritual, cultural and educational needs considered and patient is agreeable to the plan of care and goals as stated below:     Anticipated Barriers for therapy: chronicity of condition     Medical Necessity is demonstrated by the following  History  Co-morbidities and personal factors that may impact the plan of care Co-morbidities:   None    Personal Factors:   no deficits     low   Examination  Body Structures and Functions, activity limitations and participation restrictions that may impact the plan of care Body Regions:   neck  back  trunk    Body Systems:    gross symmetry  ROM  strength  gross coordinated movement  motor control  motor learning    Participation Restrictions:   inability to lifting garbage bags, lifting laundry, difficulty sleeping, inability to exercises     Activity limitations:   Learning and applying knowledge  no deficits    General Tasks and Commands  no deficits    Communication  no deficits    Mobility  lifting and carrying objects  walking    Self care  no deficits    Domestic Life  no  deficits    Interactions/Relationships  no deficits    Life Areas  no deficits    Community and Social Life  no deficits         Complexity: low  See FOTO outcome assessment   Clinical Presentation stable and uncomplicated low   Decision Making/ Complexity Score: low     GOALS: Short Term Goals:  4 weeks  1. Report decreased in pain at worse less than  <   / =  8  /10  to increase tolerance for functional activities.On going  2. Increased BLE MMT 1/2  to increase tolerance for ADL and work activities.On going  3. Pt to tolerate HEP to improve ROM and independence with ADL's.On going  4. Pt to improve range of motion by 25% to allow for improved functional mobility to allow for improvement in IADLs. On going    Long Term Goals: 8 weeks  1. Report decreased in pain at worse less than  <   / =  2  /10  to increase tolerance for functional mobility.  On going  2. Increased BLE MMT 1 grade to increase tolerance for ADL and work activities.On going  3. Pt will report at 43% or less limitation on FOTO Lumbar spine survey  to demonstrate decrease in disability and improvement in back pain.On going  4. Pt to be Independent with HEP to improve ROM and independence with ADL's. On going  5. Pt to improve range of motion by 75% to allow for improved functional mobility to allow for improvement in IADLs. On going  6. Pt to demonstrate ability to independently control and reduce their pain through posture positioning and mechanical movements throughout a typical day. (progressing, not met)    Plan   Plan of care Certification: 12/19/2022 to 02/27/2023    Outpatient Physical Therapy 2 times weekly for 10 weeks to include the following interventions: Cervical/Lumbar Traction, Electrical Stimulation PRN, Gait Training, Iontophoresis (with PRN), Manual Therapy, Moist Heat/ Ice, Neuromuscular Re-ed, Patient Education, Self Care, Therapeutic Activities, and Therapeutic Exercise. Dry needling Progress HEP towards D/C. Recommend F/U  with MD if symptoms worsen or do not resolve. Patient may be seen by a PTA for treatment to carry out their plan of care.  Face-to-face conferences will be held.     Sharmila Mcgregor PT      I CERTIFY THE NEED FOR THESE SERVICES FURNISHED UNDER THIS PLAN OF TREATMENT AND WHILE UNDER MY CARE   Physician's comments:     Physician's Signature: ___________________________________________________

## 2023-01-10 ENCOUNTER — PATIENT MESSAGE (OUTPATIENT)
Dept: REHABILITATION | Facility: OTHER | Age: 33
End: 2023-01-10
Payer: MEDICARE

## 2023-02-13 ENCOUNTER — PATIENT MESSAGE (OUTPATIENT)
Dept: INTERNAL MEDICINE | Facility: CLINIC | Age: 33
End: 2023-02-13
Payer: MEDICARE

## 2023-02-13 DIAGNOSIS — M54.9 UPPER BACK PAIN: Primary | ICD-10-CM

## 2023-02-13 DIAGNOSIS — M25.519 SHOULDER PAIN, UNSPECIFIED CHRONICITY, UNSPECIFIED LATERALITY: ICD-10-CM

## 2023-02-15 ENCOUNTER — PATIENT MESSAGE (OUTPATIENT)
Dept: INTERNAL MEDICINE | Facility: CLINIC | Age: 33
End: 2023-02-15
Payer: MEDICARE

## 2023-02-17 ENCOUNTER — PATIENT MESSAGE (OUTPATIENT)
Dept: INTERNAL MEDICINE | Facility: CLINIC | Age: 33
End: 2023-02-17
Payer: MEDICARE

## 2023-03-09 ENCOUNTER — CLINICAL SUPPORT (OUTPATIENT)
Dept: REHABILITATION | Facility: OTHER | Age: 33
End: 2023-03-09
Payer: MEDICARE

## 2023-03-09 DIAGNOSIS — R29.3 POSTURE ABNORMALITY: ICD-10-CM

## 2023-03-09 DIAGNOSIS — M25.519 SHOULDER PAIN, UNSPECIFIED CHRONICITY, UNSPECIFIED LATERALITY: ICD-10-CM

## 2023-03-09 DIAGNOSIS — M54.9 UPPER BACK PAIN: ICD-10-CM

## 2023-03-09 DIAGNOSIS — Z74.09 IMPAIRED FUNCTIONAL MOBILITY AND ACTIVITY TOLERANCE: ICD-10-CM

## 2023-03-09 DIAGNOSIS — G89.29 CHRONIC BILATERAL THORACIC BACK PAIN: ICD-10-CM

## 2023-03-09 DIAGNOSIS — R29.898 WEAKNESS OF BOTH UPPER EXTREMITIES: Primary | ICD-10-CM

## 2023-03-09 DIAGNOSIS — M54.2 NECK PAIN: ICD-10-CM

## 2023-03-09 DIAGNOSIS — M54.6 CHRONIC BILATERAL THORACIC BACK PAIN: ICD-10-CM

## 2023-03-09 PROCEDURE — 97161 PT EVAL LOW COMPLEX 20 MIN: CPT | Mod: PN

## 2023-03-09 PROCEDURE — 97110 THERAPEUTIC EXERCISES: CPT | Mod: PN

## 2023-03-09 NOTE — PLAN OF CARE
[facility]  Physical Therapy Initial Evaluation    Name: Patsy Akins  Clinic Number: [id number]    Therapy Diagnosis:   Encounter Diagnoses   Name Primary?    Upper back pain     Shoulder pain, unspecified chronicity, unspecified laterality     Neck pain     Chronic bilateral thoracic back pain     Posture abnormality     Impaired functional mobility and activity tolerance     Weakness of both upper extremities Yes     Physician: Ghanshyam Ramos MD    Physician Orders: PT Eval and Treat back and shoulder pain  Medical Diagnosis from Referral: Upper back pain (M54.9); Shoulder pain, unspecified chronicity, unspecified laterality (M25.519)  Evaluation Date: 3/9/2023  Authorization Period Expiration: 2/14/2024  Plan of Care Expiration: 05/05/2023  Visit # / Visits authorized: 1/ 1    Time In: 2:33 pm  Time Out: 3:30 pm  Total Billable Time: 25 minutes    Precautions: Standard, PTSD, Depression, seizures, bipolar disorder     Subjective   Date of onset: about 5 years ago with progressive worsening since then, referral placed 2/14/2023  History of current condition - PATSY reports: back pain and neck stiffness between the shoulders.  She notes difficulty turning head toward the right which makes normal things a lot harder.  She reports reduced strength due to several years of self isolation.  Pt previously was walking or running every other day but that was suddenly stopped.  She reports household chores are now becoming difficult.  She asked for PT to build up her strength so she could get back to normal things.  Walking is limited due to endurance not the shoulder and neck.  Lifting things overhead are challenging and reaching out/lifting the milk carton and unpacking groceries, carrying laundry/doing laundry, taking out trash.  She does have a history of migraines but is on medication although she does note frequent headaches which may be related to this pain and/or eye strain. She  does note she has not had proper furniture in years.  She did just get a table but still doesn't have a chair.  She does set a timer every 30 minutes to remind herself to move and stretch.     Burning, tingling, numbness: none although she does note a warm tingling sensation in the upper back and feels good  Falls in the last 6 months: none although she does note pushing a heavy box while moving and fell due to heat exhaustion and dehydration     Patient denies dizziness, headaches, blurry vision, nausea, vomiting, impaired sensations in groin and saddle region and changes in bowel/bladder.         Medical History:   Past Medical History:   Diagnosis Date    Bipolar affective     Bipolar disorder with depression     Anxiety disorder    Chronic back pain     Hypokalemia     Irritable bowel syndrome        Surgical History:   Patsy Akins  has no past surgical history on file.    Medications:   Patsy has a current medication list which includes the following prescription(s): bupropion, hydroxyzine hcl, lorazepam, melatonin, omeprazole, oxcarbazepine, potassium chloride sa, topiramate, and trintellix.    Allergies:   Review of patient's allergies indicates:   Allergen Reactions    Lamotrigine Hives and Itching        Imaging, none    Prior Therapy: IE but unable to continue due to transportation issues  Social History: single story, ground unit lives alone but has neighbors and is funded by [employer] and can call for help as needed  Occupation: writer and artist   Prior Level of Function: independent, intermittent neck/upper back and shoulder/scapular discomfort; years ago she was running and/or walking every other day for exercise    Current Level of Function: unable to walk for more than a minute or more at a time due to endurance and shortness of breath due to deconditioning, difficulty and pain lifting trash, performing household chores, cleaning, cooking, carrying objects, and putting away groceries      Pain:  Current 5/10, worst 7/10, best 3/10   Location: thoracic spine, cervical spine  Description: Aching, Tight, and really tense, stiff  Aggravating Factors: Lifting and carrying, quick movements, turning the head to the right, sitting upright for long periods of time  Easing Factors: back pain relief exercises, lying flat on her back on a hard surface    Pts goals: building up muscle, get back into exercising again    Objective     Observation:   Slouched posture in sitting, FHP; bilateral shaking in both hands; difficulty coordinating motions for MMT testing  Independent ambulation, no AD    Flexibility:   No observable limitations    Sensation:  Grossly intact for light touch    Range of Motion:     Cervical    Flexion  45   Extension  38   R Rotation  70   L Rotation  70   R Sidebending  30   L Sidebending  30       Shoulder Left AROM Right AROM   Flexion WFL WFL   Abduction WFL WFL   Extension WFL WFL   Internal Rotation WFL WFL   External Rotation WFL WFL     Strength/MMT:     Shoulder Left Right   Flexion 4+/5 4+/5   Abduction 4/5 4/5   Extension 4+/5 4+/5   Internal Rotation 4-/5 4-/5   External Rotation 4-/5 3+/5   Scapular     Mid traps 3-/5 3-/5   Rhomboids 3-/5 3-/5   Lower traps 3-/5 3-/5       CMS Impairment/Limitation/Restriction for FOTO Shoulder Survey    Therapist reviewed FOTO scores for Patsy Akins on 3/9/2023.   FOTO documents entered into EPIC - see Media section.    Limitation Score: 43%  Category: Carrying    Current : CK = at least 40% but < 60% impaired, limited or restricted  Goal: CJ = at least 20% but < 40% impaired, limited or restricted         CMS Impairment/Limitation/Restriction for FOTO Neck Survey    Therapist reviewed FOTO scores for Patsy Akins on 3/9/2023.   FOTO documents entered into EPIC - see Media section.    Limitation Score: 53%  Category: Carrying    Current : CK = at least 40% but < 60% impaired, limited or restricted  Goal: CK = at least 40% but < 60%  impaired, limited or restricted            TREATMENT   Treatment Time In: 3:05 pm  Treatment Time Out: 3:30 pm  Total Treatment time separate from Evaluation: 25 minutes    PATSY received therapeutic exercises to develop strength, endurance, and posture for 25 minutes including:  HEP review, performance and education (see below)  Scap retraction x5  No money with YTB (provided) x5  Row with RTB provided x5  Shoulder extension with RTB (provided) x5        Home Exercises and Patient Education Provided    Education provided:   Patient was educated on initial evaluation findings and expectations as well as future PT services, procedures, and expectations for optimal compliance with therapy.   Ways to increase endurance and walking tolerance safely - more frequent for less time/distance   Importance of working to strengthen muscles for pain relief  Water intake and dehydration, increasing water intake and if still feeling dehydrated to speak with physician for blood work and additional workups  Work set up, use of a chair  Effects of deconditioning for overall function and activity tolerance, starting small and working back up to challenging workouts including push-ups and running    Written Home Exercises Provided: Yes, see patient instructions.     Assessment   Patsy is a 33 y.o. female referred to outpatient Physical Therapy with a medical diagnosis of Upper back pain; Shoulder pain, unspecified chronicity, unspecified laterality. Pt presents with decreased cervical flexion/extension ROM, ROTATOR CUFF/scapular strength, and poor posture contributing to increased pain, decreased endurance and activity tolerance and decreased participation in recreational and household duties.  Patient appears highly motivated to improve strength and performance of basic ADL/IADLs with minimal difficulty although barriers include transportation and chronicity of condition.  Majority of treatment today included education based on pt  questions and concerns including work set up and the importance of getting a proper chair to work in as well as ways to increase endurance and activity tolerance to gradually return to walking and exercising. Patient is very receptive to information provided today and plans on trying to purchase a chair for work, increasing walking frequency at decreased time and distance, increasing water intake and performing HEP.        Pt prognosis is Fair-Good.    Pt will benefit from skilled outpatient Physical Therapy to address the deficits stated above and in the chart below, provide pt/family education, and to maximize pt's level of independence to return to PLOF.     Plan of care discussed with patient: Yes  Pt's spiritual, cultural and educational needs considered and patient is agreeable to the plan of care and goals as stated below:     Anticipated Barriers for therapy: chronicity of condition; transportation     Medical Necessity is demonstrated by the following  History  Co-morbidities and personal factors that may impact the plan of care Co-morbidities:   anxiety, coping style/mechanism, depression, difficulty sleeping, level of undertstanding of current condition, transportation assistance required, and young age    Personal Factors:   age  coping style  lifestyle  character  attitudes     moderate   Examination  Body Structures and Functions, activity limitations and participation restrictions that may impact the plan of care Body Regions:   neck  back  upper extremities  trunk    Body Systems:    gross symmetry  ROM  strength  gross coordinated movement  motor control  motor learning    Participation Restrictions:   Lifting, carrying, cleaning, household chores    Activity limitations:   Learning and applying knowledge  no deficits    General Tasks and Commands  no deficits    Communication  no deficits    Mobility  lifting and carrying objects  walking  using transportation (bus, train, plane, car)  driving  (bike, car, motorcycle)    Self care  washing oneself (bathing, drying, washing hands)  dressing    Domestic Life  shopping  cooking  doing house work (cleaning house, washing dishes, laundry)    Interactions/Relationships  no deficits    Life Areas  no deficits    Community and Social Life  no deficits         moderate   Clinical Presentation stable and uncomplicated low   Decision Making/ Complexity Score: low     Goals:    In 4 weeks,   Goal Status   Patient will be independent with HEP to promote improved therapy outcomes.  STG    2. Patient will require min VC from PT for proper scapular retraction in order to improve postural awareness. STG    3. Patient will improve scap MMT to 3/5 to improve performance of functional tasks. STG    4. Patient will improve cervical flexion/extension ROM by 10 degrees to demonstrate improved functional mobility. STG      In 8 weeks,   Goal Status   5. Patient will be independent with progression of HEP for self maintenance of symptoms.  LTG    6. Patient will improve scap and shoulder MMT to 4/5 to improve performance of functional tasks.  LTG    7. Patient will lift 5-10 lbs with <2/10 pain to shelf height or higher to put away groceries and throw away trash.  LTG    8. Patient will improve shoulder FOTO to </= 38% limitation to demonstrate improved functional mobility.  LTG    9. Patient will improve neck FOTO to </= 43% limitation to demonstrate improved functional mobility. LTG         Plan   Plan of care Certification: 3/9/2023 to 05/05/2023.    Outpatient Physical Therapy 2 times weekly for 16 visits to include the following interventions: Cervical/Lumbar Traction, Manual Therapy, Moist Heat/ Ice, Neuromuscular Re-ed, Patient Education, Therapeutic Activities, Therapeutic Exercise, and dry needling.   Pt will be seen by a physical therapist minimally every 6th visit or every 30 days.      Brenda Cassidy, PT

## 2023-03-09 NOTE — PATIENT INSTRUCTIONS
Access Code: [email or web address]  URL: [email or web address]  Date: 03/09/2023  Prepared by: Brenda Cassidy    Exercises  Seated Scapular Retraction - 1 x daily - 1 sets - 20 reps - 5 second hold  Shoulder External Rotation and Scapular Retraction with Resistance - 1 x daily - 2 sets - 10 reps - 3 second hold  Standing Row with Anchored Resistance - 1 x daily - 2 sets - 10 reps - 3 hold  Shoulder extension with resistance - Neutral - 1 x daily - 2 sets - 10 reps - 3 hold

## 2023-03-09 NOTE — PROGRESS NOTES
Therapy Diagnosis:   Encounter Diagnoses   Name Primary?    Upper back pain     Shoulder pain, unspecified chronicity, unspecified laterality     Neck pain     Chronic bilateral thoracic back pain     Posture abnormality     Impaired functional mobility and activity tolerance     Weakness of both upper extremities Yes     Physician: Ghanshyam Ramos MD

## 2023-03-24 ENCOUNTER — PATIENT MESSAGE (OUTPATIENT)
Dept: INTERNAL MEDICINE | Facility: CLINIC | Age: 33
End: 2023-03-24
Payer: MEDICARE

## 2023-03-24 DIAGNOSIS — L30.9 ECZEMA, UNSPECIFIED TYPE: Primary | ICD-10-CM

## 2023-03-27 ENCOUNTER — CLINICAL SUPPORT (OUTPATIENT)
Dept: REHABILITATION | Facility: OTHER | Age: 33
End: 2023-03-27
Payer: MEDICARE

## 2023-03-27 DIAGNOSIS — M54.2 NECK PAIN: Primary | ICD-10-CM

## 2023-03-27 DIAGNOSIS — Z74.09 IMPAIRED FUNCTIONAL MOBILITY AND ACTIVITY TOLERANCE: ICD-10-CM

## 2023-03-27 DIAGNOSIS — R29.3 POSTURE ABNORMALITY: ICD-10-CM

## 2023-03-27 DIAGNOSIS — M54.6 CHRONIC BILATERAL THORACIC BACK PAIN: ICD-10-CM

## 2023-03-27 DIAGNOSIS — G89.29 CHRONIC BILATERAL THORACIC BACK PAIN: ICD-10-CM

## 2023-03-27 PROCEDURE — 97110 THERAPEUTIC EXERCISES: CPT | Mod: PN

## 2023-03-27 NOTE — PROGRESS NOTES
Physical Therapy Daily Treatment Note     Name: Patsy Akins  [facility] Number: [id number]    Therapy Diagnosis:   Encounter Diagnoses   Name Primary?    Neck pain Yes    Chronic bilateral thoracic back pain     Posture abnormality     Impaired functional mobility and activity tolerance      Physician: Ghanshyam Ramos MD    Visit Date: 3/27/2023  Physician Orders: PT Eval and Treat back and shoulder pain  Medical Diagnosis from Referral: Upper back pain (M54.9); Shoulder pain, unspecified chronicity, unspecified laterality (M25.519)  Evaluation Date: 3/9/2023  Authorization Period Expiration: 2/14/2024  Plan of Care Expiration: 05/05/2023  Visit # / Visits authorized: 2/40    Time In: 11:30am  Time Out: 12:15pm  Total Billable Time: 45 minutes    Precautions: Standard, PTSD, Depression, seizures, bipolar disorder    Subjective     Pt reports: the pain has been a bit better at night, although it feels about the same during the day.  Pt states that she has been trying to do her exercises everyday, but sometimes the exercises will strain and so she hasn't been doing quite as many reps as prescribed.  She was compliant with home exercise program.  Response to previous treatment: first follow up since evaluation  Functional change: improved sleeping    Pain: 7/10  Location: bilateral neck and thoracic spine    Objective     Observation:   Slouched posture in sitting, FHP; bilateral shaking in both hands; difficulty coordinating motions for MMT testing  Independent ambulation, no AD     Flexibility:   No observable limitations     Sensation:  Grossly intact for light touch     Range of Motion:      Cervical     Flexion  45   Extension  38   R Rotation  70   L Rotation  70   R Sidebending  30   L Sidebending  30         Shoulder Left AROM Right AROM   Flexion WFL WFL   Abduction WFL WFL   Extension WFL WFL   Internal Rotation WFL WFL   External Rotation WFL WFL      Strength/MMT:      Shoulder Left Right   Flexion 4+/5  "4+/5   Abduction 4/5 4/5   Extension 4+/5 4+/5   Internal Rotation 4-/5 4-/5   External Rotation 4-/5 3+/5   Scapular       Mid traps 3-/5 3-/5   Rhomboids 3-/5 3-/5   Lower traps 3-/5 3-/5       Treatment     CIERRA received therapeutic exercises to develop strength, endurance, and posture for 45 minutes including:  Scap retraction 3" hold, 2x10  No money with YTB (provided) 2x10  Row with RTB provided 2x10  Shoulder extension with RTB (provided) 2x10  Shoulder horizontal abd YTB  Open book x10 ea  Seated thoracic ext, RMB, 5" hold x15  Cat/cow 2x10  Doorway stretch, 3x30"  Upper trap stretch, 3x30"  Levator scap stretch, 3x30"    Home Exercises Provided and Patient Education Provided     Education provided:   - HEP, POC    Written Home Exercises Provided: Patient instructed to cont prior HEP.  Exercises were reviewed and CIERRA was able to demonstrate them prior to the end of the session.  CIERRA demonstrated good  understanding of the education provided.     See EMR under Patient Instructions for exercises provided prior visit.    Assessment     Pt continues to demonstrate limitations in cervicothoracic ROM, posture, and periscapular strength, which contributes to difficulty with ADLs such as, lifting, prolonged positioning, and sleeping.  Pt tolerates all exercise well today, reports occasional fatigue but not significant increase in pain.  CIERRA Is progressing well towards her goals.   Pt prognosis is Excellent.     Pt will continue to benefit from skilled outpatient physical therapy to address the deficits listed in the problem list box on initial evaluation, provide pt/family education and to maximize pt's level of independence in the home and community environment.     Pt's spiritual, cultural and educational needs considered and pt agreeable to plan of care and goals.    Anticipated barriers to physical therapy: chronicity of condition; transportation     Goals:    In 4 weeks,    Goal Status   Patient will " be independent with HEP to promote improved therapy outcomes.  STG     2. Patient will require min VC from PT for proper scapular retraction in order to improve postural awareness. STG     3. Patient will improve scap MMT to 3/5 to improve performance of functional tasks. STG     4. Patient will improve cervical flexion/extension ROM by 10 degrees to demonstrate improved functional mobility. STG        In 8 weeks,    Goal Status   5. Patient will be independent with progression of HEP for self maintenance of symptoms.  LTG     6. Patient will improve scap and shoulder MMT to 4/5 to improve performance of functional tasks.  LTG     7. Patient will lift 5-10 lbs with <2/10 pain to shelf height or higher to put away groceries and throw away trash.  LTG     8. Patient will improve shoulder FOTO to </= 38% limitation to demonstrate improved functional mobility.  LTG     9. Patient will improve neck FOTO to </= 43% limitation to demonstrate improved functional mobility. LTG          Plan     Plan of care Certification: 3/9/2023 to 05/05/2023.     Outpatient Physical Therapy 2 times weekly for 16 visits to include the following interventions: Cervical/Lumbar Traction, Manual Therapy, Moist Heat/ Ice, Neuromuscular Re-ed, Patient Education, Therapeutic Activities, Therapeutic Exercise, and dry needling.   Pt will be seen by a physical therapist minimally every 6th visit or every 30 days.    Charanjit Martinez, PT

## 2023-03-31 ENCOUNTER — CLINICAL SUPPORT (OUTPATIENT)
Dept: REHABILITATION | Facility: OTHER | Age: 33
End: 2023-03-31
Payer: MEDICARE

## 2023-03-31 DIAGNOSIS — R29.3 POSTURE ABNORMALITY: ICD-10-CM

## 2023-03-31 DIAGNOSIS — Z74.09 IMPAIRED FUNCTIONAL MOBILITY AND ACTIVITY TOLERANCE: ICD-10-CM

## 2023-03-31 DIAGNOSIS — M54.6 CHRONIC BILATERAL THORACIC BACK PAIN: ICD-10-CM

## 2023-03-31 DIAGNOSIS — G89.29 CHRONIC BILATERAL THORACIC BACK PAIN: ICD-10-CM

## 2023-03-31 DIAGNOSIS — M54.2 NECK PAIN: Primary | ICD-10-CM

## 2023-03-31 PROCEDURE — 97110 THERAPEUTIC EXERCISES: CPT | Mod: PN

## 2023-03-31 NOTE — PROGRESS NOTES
Physical Therapy Daily Treatment Note     Name: Patsy Akins  Clinic Number: [id number]    Therapy Diagnosis:   Encounter Diagnoses   Name Primary?    Neck pain Yes    Chronic bilateral thoracic back pain     Posture abnormality     Impaired functional mobility and activity tolerance      Physician: Ghanshyam Ramos MD    Visit Date: 3/31/2023  Physician Orders: PT Eval and Treat back and shoulder pain  Medical Diagnosis from Referral: Upper back pain (M54.9); Shoulder pain, unspecified chronicity, unspecified laterality (M25.519)  Evaluation Date: 3/9/2023  Authorization Period Expiration: 2/14/2024  Plan of Care Expiration: 05/05/2023  Visit # / Visits authorized: 2/40    Time In: 11:30am  Time Out: 12:15pm  Total Billable Time: 45 minutes    Precautions: Standard, PTSD, Depression, seizures, bipolar disorder    Subjective     Pt reports: pt states that she has been having an easier time with exercises at home and feels that she has been able to sleep at night with less pain.  Pt feels that the exercise is helping with her pain and function.  She was compliant with home exercise program.  Response to previous treatment: improved symptoms  Functional change: improved ease of sleeping    Pain: 6/10  Location: bilateral neck and thoracic spine    Objective     Observation:   Slouched posture in sitting, FHP; bilateral shaking in both hands; difficulty coordinating motions for MMT testing  Independent ambulation, no AD     Flexibility:   No observable limitations     Sensation:  Grossly intact for light touch     Range of Motion:      Cervical     Flexion  45   Extension  38   R Rotation  70   L Rotation  70   R Sidebending  30   L Sidebending  30         Shoulder Left AROM Right AROM   Flexion WFL WFL   Abduction WFL WFL   Extension WFL WFL   Internal Rotation WFL WFL   External Rotation WFL WFL      Strength/MMT:      Shoulder Left Right   Flexion 4+/5 4+/5   Abduction 4/5 4/5   Extension 4+/5 4+/5   Internal  "Rotation 4-/5 4-/5   External Rotation 4-/5 3+/5   Scapular       Mid traps 3-/5 3-/5   Rhomboids 3-/5 3-/5   Lower traps 3-/5 3-/5       Treatment     CIERRA received therapeutic exercises to develop strength, endurance, and posture for 45 minutes including:  Scap retraction 3" hold, 2x10  No money with RTB 2x10, 1/2 FR on wall  Row with RTB 2x10  Shoulder extension with RTB 2x10  Shoulder horizontal abd RTB, 1/2 FR on wall  Open book x15 ea  Seated thoracic ext, RMB, 5" hold x15  Cat/cow 2x10  Doorway stretch, 3x30"  Upper trap stretch, 3x30"  Levator scap stretch, 3x30"    Home Exercises Provided and Patient Education Provided     Education provided:   - HEP, POC    Written Home Exercises Provided: Patient instructed to cont prior HEP.  Exercises were reviewed and CIERRA was able to demonstrate them prior to the end of the session.  CIERRA demonstrated good  understanding of the education provided.     See EMR under Patient Instructions for exercises provided prior visit.    Assessment     Pt continues to demonstrate limitations in cervicothoracic ROM, posture, and periscapular strength, which contributes to difficulty with ADLs such as, lifting, prolonged positioning, and sleeping.  Pt tolerates all exercise well today, reports subjective progress with pain and function.  CIERRA Is progressing well towards her goals.   Pt prognosis is Excellent.     Pt will continue to benefit from skilled outpatient physical therapy to address the deficits listed in the problem list box on initial evaluation, provide pt/family education and to maximize pt's level of independence in the home and community environment.     Pt's spiritual, cultural and educational needs considered and pt agreeable to plan of care and goals.    Anticipated barriers to physical therapy: chronicity of condition; transportation     Goals:    In 4 weeks,    Goal Status   Patient will be independent with HEP to promote improved therapy outcomes.  STG   "   2. Patient will require min VC from PT for proper scapular retraction in order to improve postural awareness. STG     3. Patient will improve scap MMT to 3/5 to improve performance of functional tasks. STG     4. Patient will improve cervical flexion/extension ROM by 10 degrees to demonstrate improved functional mobility. STG        In 8 weeks,    Goal Status   5. Patient will be independent with progression of HEP for self maintenance of symptoms.  LTG     6. Patient will improve scap and shoulder MMT to 4/5 to improve performance of functional tasks.  LTG     7. Patient will lift 5-10 lbs with <2/10 pain to shelf height or higher to put away groceries and throw away trash.  LTG     8. Patient will improve shoulder FOTO to </= 38% limitation to demonstrate improved functional mobility.  LTG     9. Patient will improve neck FOTO to </= 43% limitation to demonstrate improved functional mobility. LTG          Plan     Plan of care Certification: 3/9/2023 to 05/05/2023.     Outpatient Physical Therapy 2 times weekly for 16 visits to include the following interventions: Cervical/Lumbar Traction, Manual Therapy, Moist Heat/ Ice, Neuromuscular Re-ed, Patient Education, Therapeutic Activities, Therapeutic Exercise, and dry needling.   Pt will be seen by a physical therapist minimally every 6th visit or every 30 days.    Charanjit Martinez, PT

## 2023-04-06 ENCOUNTER — DOCUMENTATION ONLY (OUTPATIENT)
Dept: REHABILITATION | Facility: OTHER | Age: 33
End: 2023-04-06
Payer: MEDICARE

## 2023-04-19 NOTE — PROGRESS NOTES
"  Physical Therapy Daily Treatment Note     Name: Patsy Akins  Clinic Number: [id number]    Therapy Diagnosis:   Encounter Diagnoses   Name Primary?    Neck pain Yes    Chronic bilateral thoracic back pain     Posture abnormality     Impaired functional mobility and activity tolerance      Physician: Ghanshyam Ramos MD    Visit Date: 4/20/2023  Physician Orders: PT Eval and Treat back and shoulder pain  Medical Diagnosis from Referral: Upper back pain (M54.9); Shoulder pain, unspecified chronicity, unspecified laterality (M25.519)  Evaluation Date: 3/9/2023  Authorization Period Expiration: 2/14/2024  Plan of Care Expiration: 05/05/2023  Visit # / Visits authorized: 4 (3/40)  PTA Visit #: 1/ 5    Time In: 1:00 pm  Time Out: 1:45 pm  Total Billable Time: 45 minutes    Precautions: Standard, PTSD, Depression, seizures, bipolar disorder    Subjective   Pt reports: Feeling a little tense in upper back and neck    She was compliant with home exercise program.  Response to previous treatment: "I felt good and slept better"  Functional change: improved ease of sleeping    Pain: 4/10  Location: bilateral neck and thoracic spine    Objective     Observation:   Slouched posture in sitting, FHP; bilateral shaking in both hands; difficulty coordinating motions for MMT testing  Independent ambulation, no AD     Flexibility:   No observable limitations     Sensation:  Grossly intact for light touch     Range of Motion:      Cervical     Flexion  45   Extension  38   R Rotation  70   L Rotation  70   R Sidebending  30   L Sidebending  30         Shoulder Left AROM Right AROM   Flexion WFL WFL   Abduction WFL WFL   Extension WFL WFL   Internal Rotation WFL WFL   External Rotation WFL WFL      Strength/MMT:      Shoulder Left Right   Flexion 4+/5 4+/5   Abduction 4/5 4/5   Extension 4+/5 4+/5   Internal Rotation 4-/5 4-/5   External Rotation 4-/5 3+/5   Scapular       Mid traps 3-/5 3-/5   Rhomboids 3-/5 3-/5   Lower traps 3-/5 " "3-/5       Treatment   CIERRA received the following bolded treatments listed below:    CIERRA received therapeutic exercises to develop strength, endurance, and posture for 45 minutes including:  Open book x15 ea  Cat/cow 2x10  Doorway stretch 3x30"  Upper trap stretch 3x30"  Levator scap stretch 3x30"    Scap retraction 3" hold, 2x10  Row with RTB 2x10  Shoulder extension with RTB 2x10    No money with RTB 2x10, 1/2 foam on wall (Supine today)  Shoulder horizontal abd RTB, 1/2 foam on wall (Supine today)  Seated thoracic ext, RMB, 5" hold x15      Home Exercises Provided and Patient Education Provided     Education provided:   - Continuance of HEP    Written Home Exercises Provided: Patient instructed to cont prior HEP.  Exercises were reviewed and CIERRA was able to demonstrate them prior to the end of the session.  CIERRA demonstrated good  understanding of the education provided.     See EMR under Patient Instructions for exercises provided prior visit.    Assessment   Pt tolerated treatment well today with mild discomfort in upper thoracic spine with standing exercises against half foam, modified exercises to be performed on half foam while in supine with a noted decrease in symptoms. Cervical ROM continues to be slightly limited, but increased following stretches. Cuing to correct upper trap compensation with rows and shoulder extension.     CIERRA Is progressing well towards her goals.   Pt prognosis is Excellent.     Pt will continue to benefit from skilled outpatient physical therapy to address the deficits listed in the problem list box on initial evaluation, provide pt/family education and to maximize pt's level of independence in the home and community environment.     Pt's spiritual, cultural and educational needs considered and pt agreeable to plan of care and goals.    Anticipated barriers to physical therapy: chronicity of condition; transportation     Goals:    In 4 weeks,    Goal Status   Patient " will be independent with HEP to promote improved therapy outcomes.  STG     2. Patient will require min VC from PT for proper scapular retraction in order to improve postural awareness. STG     3. Patient will improve scap MMT to 3/5 to improve performance of functional tasks. STG     4. Patient will improve cervical flexion/extension ROM by 10 degrees to demonstrate improved functional mobility. STG        In 8 weeks,    Goal Status   5. Patient will be independent with progression of HEP for self maintenance of symptoms.  LTG     6. Patient will improve scap and shoulder MMT to 4/5 to improve performance of functional tasks.  LTG     7. Patient will lift 5-10 lbs with <2/10 pain to shelf height or higher to put away groceries and throw away trash.  LTG     8. Patient will improve shoulder FOTO to </= 38% limitation to demonstrate improved functional mobility.  LTG     9. Patient will improve neck FOTO to </= 43% limitation to demonstrate improved functional mobility. LTG        Plan   Plan of care Certification: 3/9/2023 to 05/05/2023.    Improve cervical ROM and strengthen periscapular musculature     Outpatient Physical Therapy 2 times weekly for 16 visits to include the following interventions: Cervical/Lumbar Traction, Manual Therapy, Moist Heat/ Ice, Neuromuscular Re-ed, Patient Education, Therapeutic Activities, Therapeutic Exercise, and dry needling.   Pt will be seen by a physical therapist minimally every 6th visit or every 30 days.    Shreya Lopez III, PTA

## 2023-04-20 ENCOUNTER — CLINICAL SUPPORT (OUTPATIENT)
Dept: REHABILITATION | Facility: OTHER | Age: 33
End: 2023-04-20
Payer: MEDICARE

## 2023-04-20 ENCOUNTER — DOCUMENTATION ONLY (OUTPATIENT)
Dept: REHABILITATION | Facility: OTHER | Age: 33
End: 2023-04-20

## 2023-04-20 DIAGNOSIS — M54.6 CHRONIC BILATERAL THORACIC BACK PAIN: ICD-10-CM

## 2023-04-20 DIAGNOSIS — Z74.09 IMPAIRED FUNCTIONAL MOBILITY AND ACTIVITY TOLERANCE: ICD-10-CM

## 2023-04-20 DIAGNOSIS — M54.2 NECK PAIN: Primary | ICD-10-CM

## 2023-04-20 DIAGNOSIS — G89.29 CHRONIC BILATERAL THORACIC BACK PAIN: ICD-10-CM

## 2023-04-20 DIAGNOSIS — R29.3 POSTURE ABNORMALITY: ICD-10-CM

## 2023-04-20 PROCEDURE — 97110 THERAPEUTIC EXERCISES: CPT | Mod: PN,CQ

## 2023-04-21 NOTE — PROGRESS NOTES
PT/PTA met face to face to discuss pt's treatment plan and progress towards established goals. Pt will be seen by a physical therapist minimally every 6th visit or every 30 days.    Shreya Lopez III, PTA

## 2023-05-09 ENCOUNTER — CLINICAL SUPPORT (OUTPATIENT)
Dept: REHABILITATION | Facility: OTHER | Age: 33
End: 2023-05-09
Payer: MEDICARE

## 2023-05-09 DIAGNOSIS — G89.29 CHRONIC BILATERAL THORACIC BACK PAIN: ICD-10-CM

## 2023-05-09 DIAGNOSIS — R29.3 POSTURE ABNORMALITY: ICD-10-CM

## 2023-05-09 DIAGNOSIS — Z74.09 IMPAIRED FUNCTIONAL MOBILITY AND ACTIVITY TOLERANCE: ICD-10-CM

## 2023-05-09 DIAGNOSIS — M54.2 NECK PAIN: Primary | ICD-10-CM

## 2023-05-09 DIAGNOSIS — M54.6 CHRONIC BILATERAL THORACIC BACK PAIN: ICD-10-CM

## 2023-05-09 PROCEDURE — 97110 THERAPEUTIC EXERCISES: CPT | Mod: PN

## 2023-05-09 PROCEDURE — 97112 NEUROMUSCULAR REEDUCATION: CPT | Mod: PN

## 2023-05-09 PROCEDURE — 97140 MANUAL THERAPY 1/> REGIONS: CPT | Mod: PN

## 2023-05-09 NOTE — PATIENT INSTRUCTIONS
TOWEL ROLL PEC STRETCH          Lie down on a towel roll and hold one of your wrists as it rests on your stomach.     Next, allow your shoulders to lower toward the floor as shown.    Copyright © 2023 [employer]      PRONE RETRACTION        Lying face down with your arms by your side, slowly squeeze your shoulder blades downward and towards your spine.     Hold for  3  Seconds. Perform  20  reps    Copyright © 2023 [employer]    PRONE RETRACTION EXTENSION - PRONE I          Lying face down with your arms by your side, slowly move your arms upward towards the ceiling as you squeeze your shoulder blades downwards and towards your spine.     Hold for  3  Seconds. Perform 2 sets of  10  reps    Copyright © 2023 [employer]    PRONE W        Lying face down with your elbows bent and palms facing downward, slowly raise your arms up towards the ceiling as you squeeze your shoulder blades downward and towards your spine.     Hold for  3  Seconds. Perform 2 sets of  10  reps    Copyright © 2023 [employer]      PRONE Y        Lying face down with your arms stretched out upwards as shown, slowly move your arms upward towards the ceiling as you squeeze your shoulder blades downward and towards your spine.     Hold for  3  Seconds. Perform 2 sets of  10  reps    Copyright © 2023 [employer]      Prone Ts        Start Position: arms out to your sides (like an airplane wing) Palms facing down.    End position: squeeze your shoulder blades together and raise your arms up     Hold for  3  Seconds. Perform 2 sets of  10  Reps.    Copyright © 2023 [employer]

## 2023-05-10 NOTE — PLAN OF CARE
"[facility]  Physical Therapy Updated Plan of Care    Visit Date: 5/9/2023  Name: Patsy Akins  Clinic Number: [id number]    Therapy Diagnosis:   Encounter Diagnoses   Name Primary?    Neck pain Yes    Chronic bilateral thoracic back pain     Posture abnormality     Impaired functional mobility and activity tolerance      Physician: Ghanshyam Ramos MD    Physician Orders: PT Eval and Treat back and shoulder pain  Medical Diagnosis from Referral: Upper back pain (M54.9); Shoulder pain, unspecified chronicity, unspecified laterality (M25.519)  Evaluation Date: 3/9/2023    Total Visits Received: 5  Cancelled Visits: 5  No Show Visits: 0    Current Certification Period:  3/9/2023 to 5/5/2023  Precautions:  Standard, PTSD, Depression, seizures, bipolar disorder  Visits from Evaluation Date:  4  Functional Level Prior to Evaluation:  unable to walk for more than a minute or more at a time due to endurance and shortness of breath due to deconditioning, difficulty and pain lifting trash, performing household chores, cleaning, cooking, carrying objects, and putting away groceries     Time In: 1:00 pm  Time Out: 1:45 pm  Total Billable Time: 45 minutes    Subjective     Update:     Pt reports: her neck is tense where it usually is tense. Feeling better since she is sleeping on a fouton. Her neck is not as painful. Getting more sleep. "Creeky" pain between the shoulder blades is there.    She was somewhat compliant with home exercise program due to being busy.  Response to previous treatment: no adverse effects  Functional change: lifting groceries are easier.    Pain: 3/10  Location: bilateral neck and thoracic spine    Objective     Update:     CMS Impairment/Limitation/Restriction for FOTO Shoulder Survey    Therapist reviewed FOTO scores for Patsy Akins on 5/9/2023.   FOTO documents entered into [employer] - see Media section.    Limitation Score: 49%  Category: Carrying    Current : CK = at " "least 40% but < 60% impaired, limited or restricted  Goal: CJ = at least 20% but < 40% impaired, limited or restricted       CMS Impairment/Limitation/Restriction for FOTO Neck Survey    Therapist reviewed FOTO scores for Patsy Akins on 5/9/2023.   FOTO documents entered into [employer] - see Media section.    Limitation Score: 53%  Category: Carrying    Current : CK = at least 40% but < 60% impaired, limited or restricted  Goal: CK = at least 40% but < 60% impaired, limited or restricted     Range of Motion:      Cervical     Flexion  50   Extension  40   R Rotation  70   L Rotation  65   R Sidebending  35   L Sidebending  40     Strength/MMT:      Shoulder Left Right   Flexion 5/5 5/5   Abduction 5/5 4+/5   Extension 5/5 5/5   Internal Rotation 5/5 5/5   External Rotation 5/5 4+/5   Scapular       Mid traps 4/5 4/5   Rhomboids 4/5 4/5   Lower traps 4/5 4/5       PATSY received therapeutic exercises to develop strength, endurance, and posture for 15 minutes including:  Reassessed strength, ROM, and functional limitation to update POC  Pec stretch on towel roll x 3'      Patsy participated in neuromuscular re-education activities to improve: Posture for 15 minutes. The following activities were included:  Prone scap retractions  Prone Ys 10 x 3"  Prone Ws 10 x 3"  Prone Ts 10 x 3"  Prone Is 10 x 3"    Patsy received the following manual therapy techniques: Manual traction, Myofacial release, Soft tissue Mobilization, and Friction Massage were applied to the: neck and R shoulder for 15 minutes, including:    R first rib mobilization  R upper trap release  R upper trap stretching  R levator scapular stretching  Suboccipital release  Trigger point release R levator      Assessment     Update:     Instructed pt in diaphragmatic breathing today. Pt using accessory breathing muscle contributing to her neck and shoulder pain. 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      CIERRA Is progressing well towards her goals.   Pt prognosis is Excellent.     Pt will continue to benefit from skilled outpatient physical therapy to address the deficits listed in the problem list box on initial evaluation, provide pt/family education and to maximize pt's level of independence in the home and community environment.     Pt's spiritual, cultural and educational needs considered and pt agreeable to plan of care and goals.    Anticipated barriers to physical therapy: chronicity of condition; transportation     Previous Short Term Goals Status:     Goals:    In 4 weeks,    Goal Status   Patient will be independent with HEP to promote improved therapy outcomes.  STG  ongoing   2. Patient will require min VC from PT for proper scapular retraction in order to improve postural awareness. STG  met   3. Patient will improve scap MMT to 3/5 to improve performance of functional tasks. STG  met   4. Patient will improve cervical flexion/extension ROM by 10 degrees to demonstrate improved functional mobility. STG  progressing, not met      In 8 weeks,    Goal Status   5. Patient will be independent with progression of HEP for self maintenance of symptoms.  LTG  ongoing   6. Patient will improve scap and shoulder MMT to 4/5 to improve performance of functional tasks.  LTG  met   7. Patient will lift 5-10 lbs with <2/10 pain to shelf height or higher to put away groceries and throw away trash.  LTG  progressing, not met   8. Patient will improve shoulder FOTO to </= 38% limitation to demonstrate improved functional mobility.  LTG  not met   9. Patient will improve neck FOTO to </= 43% limitation to demonstrate improved functional mobility. LTG  not met       Long Term Goal Status:   continue per initial plan of care.  Reasons for Recertification of Therapy:   Patient is progressing in OP PT with increased strength and cervical ROM. Will benefit from  continued OP PT for postural reeducation, periscapular strengthening, manual therapy, and neck strengthening to fully achieve goals.    Plan     Updated Certification Period: 5/9/2023 to 7/5/2023  Recommended Treatment Plan: 2 times per week for 8 weeks: Manual Therapy, Moist Heat/ Ice, Neuromuscular Re-ed, Patient Education, Therapeutic Activities, Therapeutic Exercise, and Dry Needling    Davonte Rodriguez, PT  5/9/2023      I CERTIFY THE NEED FOR THESE SERVICES FURNISHED UNDER THIS PLAN OF TREATMENT AND WHILE UNDER MY CARE    Physician's comments:        Physician's Signature: ___________________________________________________

## 2023-05-15 NOTE — PROGRESS NOTES
"  Physical Therapy Daily Treatment Note     Name: Patsy Akins  Clinic Number: [id number]    Therapy Diagnosis:   Encounter Diagnoses   Name Primary?    Neck pain Yes    Chronic bilateral thoracic back pain     Posture abnormality     Impaired functional mobility and activity tolerance        Physician: Ghanshyam Ramos MD    Visit Date: 5/16/2023  Physician Orders: PT Eval and Treat back and shoulder pain  Medical Diagnosis from Referral: Upper back pain (M54.9); Shoulder pain, unspecified chronicity, unspecified laterality (M25.519)  Evaluation Date: 3/9/2023  Authorization Period Expiration: 12/31/2023  Plan of Care Expiration: 5/9/2023 - 07/05/2023  Visit # / Visits authorized: 6 (5/40)  Re-assessment: due 6/9/2032  FOTO: 5/9/2023 (2/5)   PTA Visit #: 0/ 5    Time In: 2:00 pm   Time Out: 3:04 pm   Total Billable Time: 30 minutes    Precautions: Standard, PTSD, Depression, seizures, bipolar disorder    Subjective   Pt reports: she is feeling okay, more tired an anything because she didn't sleep well last night.     She was compliant with home exercise program.  Response to previous treatment: "I felt good and slept better"  Functional change: improved ease of sleeping    Pain: 4/10  Location: bilateral neck and thoracic spine    Objective     Objective measures updated at progress report unless otherwise noted.        Treatment   PATSY received the following bolded treatments listed below:    PATSY received therapeutic exercises to develop strength, endurance, and posture for 35 minutes including:  UBE level 1 fwd/back 3 mins each  Open book x15 ea; with RTB x15 ea  No money with RTB 2x10, 1/2 foam on wall (Supine today)  Shoulder horizontal abd RTB, 2x 10 1/2 foam on wall (Supine today)  + Serratus punch with 5# dowel over 1/2 foam 2x 15   + Pec stretch over 1/2 foam x3 minutes  Seated thoracic ext, RMB, 5" hold x15  Seated scap retraction with foam roll x30 with 5 second holds    Not performed " "today:  Cat/cow 2x10  Doorway stretch 3x30"  Upper trap stretch 3x30"  Levator scap stretch 3x30"  Scap retraction 3" hold, 2x10  Row with RTB 2x10  Shoulder extension with RTB 2x10      CIERRA participated in dynamic functional therapeutic activities to improve functional performance for 00  minutes, including:       CIERRA participated in neuromuscular re-education activities to improve: Posture for 25 minutes. The following activities were included:  Prone scap retractions 15x 5 second hold  Prone Ys 10 x 3"  Prone Ws 10 x 3"  Prone Ts 15 x 3"  Prone Is 10 x 3"  + Walking horizontal ABD with RTB 2x 10 yds  + Walking diagonals with RTB x10 yds    CIERRA received the following manual therapy techniques: Manual traction, Myofacial release, Soft tissue Mobilization, and Friction Massage were applied to the: neck and right shoulder for 00 minutes, including:  R first rib mobilization  R upper trap release  R upper trap stretching  R levator scapular stretching  Suboccipital release  Trigger point release R levator       Home Exercises Provided and Patient Education Provided     Education provided:   - Continuance of HEP    Written Home Exercises Provided: Patient instructed to cont prior HEP.  Exercises were reviewed and CIERRA was able to demonstrate them prior to the end of the session.  CIERRA demonstrated good  understanding of the education provided.     See EMR under Patient Instructions for exercises provided prior visit.    Assessment   Difficulty isolating scap muscles with prone exercises.  Significant cueing required both verbal and tactile with minimal carry over.  Improved carry over with seated exercise and reduced compensatory strategy.  Coordination deficits noted and increased tremor noted today.  Continue with postural strengthening as tolerated.       CIERRA Is progressing well towards her goals.   Pt prognosis is Excellent.     Pt will continue to benefit from skilled outpatient physical therapy to " address the deficits listed in the problem list box on initial evaluation, provide pt/family education and to maximize pt's level of independence in the home and community environment.     Pt's spiritual, cultural and educational needs considered and pt agreeable to plan of care and goals.    Anticipated barriers to physical therapy: chronicity of condition; transportation     Goals:    In 4 weeks,    Goal Status   Patient will be independent with HEP to promote improved therapy outcomes.  STG  ongoing   2. Patient will require min VC from PT for proper scapular retraction in order to improve postural awareness. STG  met   3. Patient will improve scap MMT to 3/5 to improve performance of functional tasks. STG  met   4. Patient will improve cervical flexion/extension ROM by 10 degrees to demonstrate improved functional mobility. STG  progressing, not met      In 8 weeks,    Goal Status   5. Patient will be independent with progression of HEP for self maintenance of symptoms.  LTG  ongoing   6. Patient will improve scap and shoulder MMT to 4/5 to improve performance of functional tasks.  LTG  met   7. Patient will lift 5-10 lbs with <2/10 pain to shelf height or higher to put away groceries and throw away trash.  LTG  progressing, not met   8. Patient will improve shoulder FOTO to </= 38% limitation to demonstrate improved functional mobility.  LTG  not met   9. Patient will improve neck FOTO to </= 43% limitation to demonstrate improved functional mobility. LTG  not met         Plan     Improve cervical ROM and strengthen periscapular musculature     Updated Certification Period: 5/9/2023 to 7/5/2023  Recommended Treatment Plan: 2 times per week for 8 weeks: Manual Therapy, Moist Heat/ Ice, Neuromuscular Re-ed, Patient Education, Therapeutic Activities, Therapeutic Exercise, and Dry Needling  Pt will be seen by a physical therapist minimally every 6th visit or every 30 days.    Brenda Cassidy, PT

## 2023-05-16 ENCOUNTER — CLINICAL SUPPORT (OUTPATIENT)
Dept: REHABILITATION | Facility: OTHER | Age: 33
End: 2023-05-16
Payer: MEDICARE

## 2023-05-16 DIAGNOSIS — M54.2 NECK PAIN: Primary | ICD-10-CM

## 2023-05-16 DIAGNOSIS — Z74.09 IMPAIRED FUNCTIONAL MOBILITY AND ACTIVITY TOLERANCE: ICD-10-CM

## 2023-05-16 DIAGNOSIS — M54.6 CHRONIC BILATERAL THORACIC BACK PAIN: ICD-10-CM

## 2023-05-16 DIAGNOSIS — R29.3 POSTURE ABNORMALITY: ICD-10-CM

## 2023-05-16 DIAGNOSIS — G89.29 CHRONIC BILATERAL THORACIC BACK PAIN: ICD-10-CM

## 2023-05-16 PROCEDURE — 97110 THERAPEUTIC EXERCISES: CPT | Mod: PN

## 2023-05-16 PROCEDURE — 97112 NEUROMUSCULAR REEDUCATION: CPT | Mod: PN

## 2023-06-07 DIAGNOSIS — G43.909 MIGRAINE WITHOUT STATUS MIGRAINOSUS, NOT INTRACTABLE, UNSPECIFIED MIGRAINE TYPE: ICD-10-CM

## 2023-06-07 RX ORDER — TOPIRAMATE 200 MG/1
TABLET ORAL
Qty: 30 TABLET | Refills: 10 | Status: SHIPPED | OUTPATIENT
Start: 2023-06-07

## 2023-06-28 ENCOUNTER — PATIENT MESSAGE (OUTPATIENT)
Dept: INTERNAL MEDICINE | Facility: CLINIC | Age: 33
End: 2023-06-28
Payer: MEDICARE

## 2023-06-28 DIAGNOSIS — M54.50 BACK PAIN OF THORACOLUMBAR REGION: Primary | ICD-10-CM

## 2023-06-28 DIAGNOSIS — M25.512 BILATERAL SHOULDER PAIN, UNSPECIFIED CHRONICITY: ICD-10-CM

## 2023-06-28 DIAGNOSIS — M54.6 BACK PAIN OF THORACOLUMBAR REGION: Primary | ICD-10-CM

## 2023-06-28 DIAGNOSIS — M25.511 BILATERAL SHOULDER PAIN, UNSPECIFIED CHRONICITY: ICD-10-CM

## 2023-07-06 ENCOUNTER — PATIENT MESSAGE (OUTPATIENT)
Dept: INTERNAL MEDICINE | Facility: CLINIC | Age: 33
End: 2023-07-06
Payer: MEDICARE

## 2023-07-19 DIAGNOSIS — I10 ESSENTIAL HYPERTENSION: ICD-10-CM

## 2023-07-24 ENCOUNTER — PATIENT MESSAGE (OUTPATIENT)
Dept: ADMINISTRATIVE | Facility: HOSPITAL | Age: 33
End: 2023-07-24
Payer: MEDICARE

## 2023-07-25 ENCOUNTER — PATIENT MESSAGE (OUTPATIENT)
Dept: INTERNAL MEDICINE | Facility: CLINIC | Age: 33
End: 2023-07-25
Payer: MEDICARE

## 2023-08-01 ENCOUNTER — CLINICAL SUPPORT (OUTPATIENT)
Dept: REHABILITATION | Facility: OTHER | Age: 33
End: 2023-08-01
Payer: MEDICARE

## 2023-08-01 DIAGNOSIS — M54.50 BACK PAIN OF THORACOLUMBAR REGION: ICD-10-CM

## 2023-08-01 DIAGNOSIS — M25.512 BILATERAL SHOULDER PAIN, UNSPECIFIED CHRONICITY: ICD-10-CM

## 2023-08-01 DIAGNOSIS — M54.6 BACK PAIN OF THORACOLUMBAR REGION: ICD-10-CM

## 2023-08-01 DIAGNOSIS — G89.29 CHRONIC BILATERAL THORACIC BACK PAIN: ICD-10-CM

## 2023-08-01 DIAGNOSIS — M25.511 BILATERAL SHOULDER PAIN, UNSPECIFIED CHRONICITY: ICD-10-CM

## 2023-08-01 DIAGNOSIS — R29.898 ARM WEAKNESS: ICD-10-CM

## 2023-08-01 DIAGNOSIS — M54.6 CHRONIC BILATERAL THORACIC BACK PAIN: ICD-10-CM

## 2023-08-01 PROBLEM — R29.3 POOR POSTURE: Status: RESOLVED | Noted: 2022-12-20 | Resolved: 2023-08-01

## 2023-08-01 PROBLEM — R29.3 POSTURE ABNORMALITY: Status: RESOLVED | Noted: 2023-03-09 | Resolved: 2023-08-01

## 2023-08-01 PROBLEM — Z74.09 IMPAIRED FUNCTIONAL MOBILITY AND ACTIVITY TOLERANCE: Status: RESOLVED | Noted: 2023-03-09 | Resolved: 2023-08-01

## 2023-08-01 PROBLEM — R68.89 DECREASED FUNCTIONAL ACTIVITY TOLERANCE: Status: RESOLVED | Noted: 2022-12-20 | Resolved: 2023-08-01

## 2023-08-01 PROBLEM — M54.2 NECK PAIN: Status: RESOLVED | Noted: 2023-03-09 | Resolved: 2023-08-01

## 2023-08-01 PROCEDURE — 97161 PT EVAL LOW COMPLEX 20 MIN: CPT | Mod: PN

## 2023-08-01 PROCEDURE — 97112 NEUROMUSCULAR REEDUCATION: CPT | Mod: PN

## 2023-08-01 NOTE — PLAN OF CARE
"[facility]  Physical Therapy Initial Evaluation    Name: Patsy Akins  Clinic Number: [id number]    Therapy Diagnosis:   Encounter Diagnoses   Name Primary?    Back pain of thoracolumbar region     Bilateral shoulder pain, unspecified chronicity     Chronic bilateral thoracic back pain     Arm weakness      Physician: Ghanshyam Ramos MD    Physician Orders: Physical Therapy Evaluate and Treat  Medical Diagnosis from Referral: Back pain of thoracolumbar region [M54.50, M54.6], Bilateral shoulder pain, unspecified chronicity [M25.511, M25.512]  Evaluation Date: 8/1/2023  Authorization Period Expiration: 6/28/24  Plan of Care Expiration: 8/1/2023 to 11/1/23  Visit # / Visits authorized: 1/1 (pending additional authorization following initial evaluation)     Time In: 0200pm  Time Out: 0300pm  Total Billable Time: 60 minutes    Precautions: Standard    Subjective     Date of onset: 3 years ago during COVID    History of current condition - PATSY reports that she began feeling very weak in her mid back after the COVID 19 pandemic. Pt states that she feels pain constantly and describes it as "tightness" that is worse if she tries to lift anything heavy. Pt feels like her muscles "stiffen up" if she tries to do any house work. She states that she feels that this came about after being very stationary during COVID. Pt reports that last time she came to therapy she got a lot stronger and was doing better. She returns to our office for continuation of her prior therapy. Pt is not working right now and she lives in [place] and takes transportation to get to therapy. 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     Medical History:   Past Medical History:   Diagnosis Date    Bipolar affective     Bipolar disorder with depression     Anxiety disorder    Chronic back pain     Hypokalemia     Irritable bowel syndrome  "       Surgical History:   Patsy Akins  has no past surgical history on file.    Medications:   Patsy has a current medication list which includes the following prescription(s): bupropion, hydroxyzine hcl, lorazepam, melatonin, omeprazole, oxcarbazepine, potassium chloride sa, topiramate, and trintellix.    Allergies:   Review of patient's allergies indicates:   Allergen Reactions    Lamotrigine Hives and Itching        Imaging: none    Prior Therapy: yes, at our location this year and she found it very helpful  Social History: Pt lives alone in an apartment in [place].   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  Prior Level of Function: Pt was previously able to sit and write for 8 hours at a time.  Current Level of Function: Pt only able to sit to write for 1/2 hour without taking a break due to her mid back pain.     Pain:  Current 2/10, worst 3/10, best 2/10   Location: bilateral mid back   Description: Aching and Dull  Aggravating Factors: Sitting  Easing Factors:  changing positions    Pts goals: Pt would like to return to sitting to write for 8 hours with no increase in mid back pain.    Objective     WNL=within normal limits  WFL=within functional limits  NT=not tested  *=pain    Posture: Forward head, rounded shoulders   Palpation: Pain/tenderness to R upper trapezius  Sensation: Pt denies numbness and tingling into Ue's   Deep tendon reflexes: NT        Cervical ROM:  Cervical    Flexion 90   Extension 70*   Right rotation 60   Left rotation 65     Active Range of Motion:   Shoulder Right Left   Flexion 180 180   Abduction 175* 180   ER  90 90   IR 75 75     Strength:  Shoulder Right Left   Flexion 3/5 3+/5   Scaption 3+/5 3/5   ER 3/5 3+/5   IR 4/5 4+/5   Upper trap 3/5 3/5   Middle Trapezius 3/5 3/5   Lower Trapezius 3/5 3-/5   Serratus Anterior 3/5 3+/5   Rhomboids 4/5 4/5         CMS Impairment/Limitation/Restriction for FOTO Survey    Therapist reviewed FOTO scores for Patsy BARONE  "Mable on 8/1/2023.   FOTO documents entered into [employer] - see Media section.    Limitation Score: 45%  Predicted Goal: 52%    Category: Mobility     TREATMENT     Treatment Time In: 0200pm  Treatment Time Out: 0300pm  Total Treatment time separate from Evaluation: 8 minutes    Neuromuscular re education for 8 minutes for improved balance and proprioception including:   +Prone Y 20x5" hold  +Prone T 20x 5" hold   +Rows 3x10 repetitions      Home Exercises and Patient Education Provided:    Education provided:   - Findings; prognosis and plan of care (POC)  - Home exercise program (HEP)  - Modality options  - Therapist contact information    Written Home Exercises Provided: Yes  Exercises were reviewed and PATSY was able to demonstrate them prior to the end of the session.  PATSY demonstrated good understanding of the education provided.     See EMR under Patient Instructions for exercises provided today.    Assessment     Patsy is a 33 y.o. female referred to outpatient Physical Therapy with a medical diagnosis of Back pain of thoracolumbar region [M54.50, M54.6], Bilateral shoulder pain, unspecified chronicity [M25.511, M25.512]. Pt presents to PT with pain, decreased mid back ROM, decreased strength and flexibility, poor posture, and functional deficits with postural endurance. These deficits are negatively impacting this patient's ability to complete their work duties and activities of daily living.     Pt prognosis is Fair.   Pt will benefit from skilled outpatient Physical Therapy to address the deficits stated above and in the chart below, provide pt/family education, and to maximize pt's level of independence.     Plan of care discussed with patient: Yes  Pt's spiritual, cultural and educational needs considered and pt agreeable to plan of care and goals as stated below:     Anticipated Barriers for therapy: None    Medical Necessity is demonstrated by the following  History  Co-morbidities and personal " factors that may impact the plan of care Co-morbidities:   anxiety, depression, and young age    Personal Factors:   age     moderate   Examination  Body Structures and Functions, activity limitations and participation restrictions that may impact the plan of care Body Regions:   back  upper extremities    Body Systems:    gross symmetry  ROM  strength  gross coordinated movement    Participation Restrictions:   Walking    Activity limitations:   Learning and applying knowledge  no deficits    General Tasks and Commands  No Deficits    Communication  No Deficits    Mobility  lifting and carrying objects    Self care  no deficits    Domestic Life  No Deficits    Interactions/Relationships  No Deficits    Life Areas  No Deficits    Community and Social Life  No Deficits         moderate   Clinical Presentation evolving clinical presentation with changing clinical characteristics moderate   Decision Making/ Complexity Score: moderate     GOALS:  Short Term Goals:    1.) Pt will improve their FOTO score by 5% to return to PLOF. (Progressing, not met)  2.) Pt will decrease their mid back pain to 2/10 for improved QOL. (Progressing, not met)  3.) Pt will tolerating lifting one, 15 pound kettlebell from floor to counter height with no increase in back pain. (Progressing, not met)  4.) Pt will improve their right middle trapezius strength to 4+/5 to return to their PLOF. (Progressing, not met)  5.) Pt will become independent with their HEP to improve strength and tolerance to functional activities. (Progressing, not met)    Long Term Goals:  1.) Pt will improve their FOTO score by 10% to return to PLOF. (Progressing, not met)  2.) Pt will decrease their mid back pain to 0/10 for improved QOL. (Progressing, not met)  3.) Pt will set up her desk space for writing with optimal home ergonomics. (Progressing, not met)  4.) Pt will improve their left middle trapezius strength to 4+/5 to return to their PLOF. (Progressing, not  met)  5.) Pt will tolerate writing for 4 hours with no increase in mid back pain to return to PLOF. (Progressing, not met)       Plan     Plan of care Certification: 8/1/2023 to 11/1/23.    Outpatient Physical Therapy 2 times weekly for 8 weeks to include the following interventions: Therapeutic Exercises, Manual Therapeutic Technique, Neuromuscular Re Education, Therapeutic Activities. Modalities, Kinesiotape prn, and Functional Dry Needling as needed.    Karen Griffin, PT,  DPT, OCS

## 2023-08-14 ENCOUNTER — PATIENT MESSAGE (OUTPATIENT)
Dept: INTERNAL MEDICINE | Facility: CLINIC | Age: 33
End: 2023-08-14
Payer: MEDICARE

## 2023-08-14 RX ORDER — OXCARBAZEPINE 300 MG/1
150 TABLET, FILM COATED ORAL 2 TIMES DAILY
Qty: 180 TABLET | Refills: 2 | Status: SHIPPED | OUTPATIENT
Start: 2023-08-14 | End: 2024-01-03 | Stop reason: SDUPTHER

## 2023-08-26 ENCOUNTER — NURSE TRIAGE (OUTPATIENT)
Dept: ADMINISTRATIVE | Facility: CLINIC | Age: 33
End: 2023-08-26
Payer: MEDICARE

## 2023-08-26 NOTE — TELEPHONE ENCOUNTER
Pt c/o constipation. States that last BM was Tuesday. Denies abdominal and reports that she is passing gas. Advised per protocol. Verbalized understanding. Pt already schedule for appt per protocol. Encounter routed to provider.       Reason for Disposition   Unable to have a bowel movement (BM) without laxative or enema    Additional Information   Negative: [1] Vomiting AND [2] contains bile (green color)   Negative: Patient sounds very sick or weak to the triager   Negative: [1] Vomiting AND [2] abdomen looks much more swollen than usual   Negative: [1] Constant abdominal pain AND [2] present > 2 hours   Negative: [1] Rectal pain or fullness from fecal impaction (rectum full of stool) AND [2] NOT better after SITZ bath, suppository or enema   Negative: [1] Intermittent mild abdominal pain AND [2] fever   Negative: Abdomen is more swollen than usual   Negative: Last bowel movement (BM) > 4 days ago   Negative: Leaking stool   Negative: Unable to have a bowel movement (BM) without manually removing stool (using finger to pull out stool or perform disimpaction)    Protocols used: Constipation-A-AH

## 2023-08-27 ENCOUNTER — NURSE TRIAGE (OUTPATIENT)
Dept: ADMINISTRATIVE | Facility: CLINIC | Age: 33
End: 2023-08-27
Payer: MEDICARE

## 2023-08-27 NOTE — TELEPHONE ENCOUNTER
Reason for Disposition   Abdomen is more swollen than usual    Additional Information   Negative: [1] Vomiting AND [2] contains bile (green color)   Negative: Patient sounds very sick or weak to the triager   Negative: [1] Vomiting AND [2] abdomen looks much more swollen than usual   Negative: [1] Constant abdominal pain AND [2] present > 2 hours   Negative: [1] Rectal pain or fullness from fecal impaction (rectum full of stool) AND [2] NOT better after SITZ bath, suppository or enema   Negative: [1] Intermittent mild abdominal pain AND [2] fever    Protocols used: Constipation-A-AH  Pt called re constipated for a while. Pt states she called yest but not much has changed. Pt states she passed small BM today. Still feeling bloated. Appt set elle. what to eat? last formed stool on tues. pt states not in pain. Feels pressure in abd/rectum. No N/V. +flatus. Took MOM x1 yest. On fiber supplement. Rec to see dr within 24 hours. Offered protocol advice. Offered and accepted on demand visit.

## 2023-08-28 ENCOUNTER — OFFICE VISIT (OUTPATIENT)
Dept: INTERNAL MEDICINE | Facility: CLINIC | Age: 33
End: 2023-08-28
Payer: MEDICARE

## 2023-08-28 ENCOUNTER — PATIENT MESSAGE (OUTPATIENT)
Dept: INTERNAL MEDICINE | Facility: CLINIC | Age: 33
End: 2023-08-28
Payer: MEDICARE

## 2023-08-28 DIAGNOSIS — F31.9 BIPOLAR 1 DISORDER, DEPRESSED: ICD-10-CM

## 2023-08-28 DIAGNOSIS — I10 ESSENTIAL HYPERTENSION: ICD-10-CM

## 2023-08-28 DIAGNOSIS — K59.00 CONSTIPATION, UNSPECIFIED CONSTIPATION TYPE: Primary | ICD-10-CM

## 2023-08-28 PROCEDURE — 99214 PR OFFICE/OUTPT VISIT, EST, LEVL IV, 30-39 MIN: ICD-10-PCS | Mod: 95,,, | Performed by: INTERNAL MEDICINE

## 2023-08-28 PROCEDURE — 99214 OFFICE O/P EST MOD 30 MIN: CPT | Mod: 95,,, | Performed by: INTERNAL MEDICINE

## 2023-08-28 NOTE — PROGRESS NOTES
Subjective:       Patient ID: Patsy Akins is a 33 y.o. female.    Chief Complaint: Constipation (Off and on for months. )      The patient location is: [place]  The chief complaint leading to consultation is: Constipation and bloated.     Visit type: audiovisual    Face to Face time with patient: 15 minutes  18 minutes of total time spent on the encounter, which includes face to face time and non-face to face time preparing to see the patient (eg, review of tests), Obtaining and/or reviewing separately obtained history, Documenting clinical information in the electronic or other health record, Independently interpreting results (not separately reported) and communicating results to the patient/family/caregiver, or Care coordination (not separately reported).         Each patient to whom he or she provides medical services by telemedicine is:  (1) informed of the relationship between the physician and patient and the respective role of any other health care provider with respect to management of the patient; and (2) notified that he or she may decline to receive medical services by telemedicine and may withdraw from such care at any time.    Notes:     HPI: Constipation for a week or two. Has not been eating as much fiber and has not been as active.   Tried non-stimulant meds didn't help but the miralax started to help this AM. She had a large bowel movement and feels much better.   Ate some breakfast this AM some fiber.   Good appetite, still with gas and normal urine.   Some of her anxiety meds cause dry mouth and may contribute      Suggested good hydration and water intake,  good fiber, vegetable intake and ok to take the Miralax prn as directed.   May benefit from labs including TSH.   If still not fixed or corrected, would consider GI consult.       Review of Systems   Constitutional:  Negative for activity change and unexpected weight change.   HENT:  Negative for hearing loss, rhinorrhea and trouble  swallowing.    Eyes:  Negative for discharge and visual disturbance.   Respiratory:  Negative for chest tightness and wheezing.    Cardiovascular:  Negative for chest pain and palpitations.   Gastrointestinal:  Positive for constipation and diarrhea. Negative for blood in stool and vomiting.   Endocrine: Negative for polydipsia and polyuria.   Genitourinary:  Negative for difficulty urinating, dysuria, hematuria and menstrual problem.   Musculoskeletal:  Positive for neck pain. Negative for arthralgias and joint swelling.   Neurological:  Positive for weakness. Negative for headaches.   Psychiatric/Behavioral:  Negative for confusion and dysphoric mood.          Objective:      Physical Exam  Constitutional:       Appearance: Normal appearance.   Abdominal:      General: There is distension.      Tenderness: There is no abdominal tenderness.   Neurological:      Mental Status: She is alert.   Psychiatric:         Thought Content: Thought content normal.         Assessment:       Problem List Items Addressed This Visit          Psychiatric    Bipolar 1 disorder, depressed    Relevant Orders    CBC Auto Differential    Basic Metabolic Panel    TSH       Cardiac/Vascular    Essential hypertension    Relevant Orders    CBC Auto Differential    Basic Metabolic Panel    TSH     Other Visit Diagnoses       Constipation, unspecified constipation type    -  Primary    Relevant Orders    CBC Auto Differential    Basic Metabolic Panel    TSH            Plan:       Patsy was seen today for constipation.    Diagnoses and all orders for this visit:    Constipation, unspecified constipation type  -     CBC Auto Differential; Future  -     Basic Metabolic Panel; Future  -     TSH; Future    Bipolar 1 disorder, depressed  -     CBC Auto Differential; Future  -     Basic Metabolic Panel; Future  -     TSH; Future    Essential hypertension  -     CBC Auto Differential; Future  -     Basic Metabolic Panel; Future  -     TSH;  "Future           Good discussion with patient.  Trial of MiraLax a little longer, continue fiber, water and physical activity   Labs ordered   Follow-up if not improving and possible gastro consult        Portions of this note may have been created with voice recognition software. Occasional "wrong-word" or "sound-a-like" substitutions may have occurred due to the inherent limitations of voice recognition software. Please, read the note carefully and recognize, using context, where substitutions have occurred.  "

## 2023-08-30 ENCOUNTER — HOSPITAL ENCOUNTER (EMERGENCY)
Facility: HOSPITAL | Age: 33
Discharge: HOME | End: 2023-08-30
Attending: EMERGENCY MEDICINE
Payer: MEDICARE

## 2023-08-30 VITALS
HEART RATE: 85 BPM | TEMPERATURE: 99 F | RESPIRATION RATE: 16 BRPM | SYSTOLIC BLOOD PRESSURE: 110 MMHG | DIASTOLIC BLOOD PRESSURE: 68 MMHG | BODY MASS INDEX: 19.92 KG/M2 | OXYGEN SATURATION: 100 % | WEIGHT: 112.44 LBS

## 2023-08-30 DIAGNOSIS — R25.1 TREMOR: ICD-10-CM

## 2023-08-30 DIAGNOSIS — D64.9 ANEMIA, UNSPECIFIED TYPE: ICD-10-CM

## 2023-08-30 DIAGNOSIS — E86.0 DEHYDRATION: Primary | ICD-10-CM

## 2023-08-30 LAB
ALBUMIN SERPL BCP-MCNC: 3.3 G/DL (ref 3.5–5.2)
ALP SERPL-CCNC: 48 U/L (ref 55–135)
ALT SERPL W/O P-5'-P-CCNC: 12 U/L (ref 10–44)
ANION GAP SERPL CALC-SCNC: 8 MMOL/L (ref 8–16)
AST SERPL-CCNC: 17 U/L (ref 10–40)
B-HCG UR QL: NEGATIVE
BASOPHILS # BLD AUTO: 0.04 K/UL (ref 0–0.2)
BASOPHILS NFR BLD: 0.9 % (ref 0–1.9)
BILIRUB SERPL-MCNC: 0.2 MG/DL (ref 0.1–1)
BILIRUB UR QL STRIP: NEGATIVE
BUN SERPL-MCNC: 6 MG/DL (ref 6–20)
CALCIUM SERPL-MCNC: 8.2 MG/DL (ref 8.7–10.5)
CHLORIDE SERPL-SCNC: 114 MMOL/L (ref 95–110)
CK SERPL-CCNC: 56 U/L (ref 20–180)
CLARITY UR REFRACT.AUTO: CLEAR
CO2 SERPL-SCNC: 17 MMOL/L (ref 23–29)
COLOR UR AUTO: NORMAL
CREAT SERPL-MCNC: 0.7 MG/DL (ref 0.5–1.4)
CTP QC/QA: YES
DIFFERENTIAL METHOD: ABNORMAL
EOSINOPHIL # BLD AUTO: 0.1 K/UL (ref 0–0.5)
EOSINOPHIL NFR BLD: 1.9 % (ref 0–8)
ERYTHROCYTE [DISTWIDTH] IN BLOOD BY AUTOMATED COUNT: 16 % (ref 11.5–14.5)
EST. GFR  (NO RACE VARIABLE): >60 ML/MIN/1.73 M^2
GLUCOSE SERPL-MCNC: 79 MG/DL (ref 70–110)
GLUCOSE UR QL STRIP: NEGATIVE
HCT VFR BLD AUTO: 28.7 % (ref 37–48.5)
HGB BLD-MCNC: 8.6 G/DL (ref 12–16)
HGB UR QL STRIP: NEGATIVE
IMM GRANULOCYTES # BLD AUTO: 0 K/UL (ref 0–0.04)
IMM GRANULOCYTES NFR BLD AUTO: 0 % (ref 0–0.5)
KETONES UR QL STRIP: NEGATIVE
LEUKOCYTE ESTERASE UR QL STRIP: NEGATIVE
LIPASE SERPL-CCNC: 23 U/L (ref 4–60)
LYMPHOCYTES # BLD AUTO: 1.5 K/UL (ref 1–4.8)
LYMPHOCYTES NFR BLD: 36.5 % (ref 18–48)
MCH RBC QN AUTO: 23.5 PG (ref 27–31)
MCHC RBC AUTO-ENTMCNC: 30 G/DL (ref 32–36)
MCV RBC AUTO: 78 FL (ref 82–98)
MONOCYTES # BLD AUTO: 0.4 K/UL (ref 0.3–1)
MONOCYTES NFR BLD: 9.5 % (ref 4–15)
NEUTROPHILS # BLD AUTO: 2.2 K/UL (ref 1.8–7.7)
NEUTROPHILS NFR BLD: 51.2 % (ref 38–73)
NITRITE UR QL STRIP: NEGATIVE
NRBC BLD-RTO: 0 /100 WBC
PH UR STRIP: 6 [PH] (ref 5–8)
PLATELET # BLD AUTO: 216 K/UL (ref 150–450)
PMV BLD AUTO: 8.2 FL (ref 9.2–12.9)
POTASSIUM SERPL-SCNC: 3.6 MMOL/L (ref 3.5–5.1)
PROT SERPL-MCNC: 5.7 G/DL (ref 6–8.4)
PROT UR QL STRIP: NEGATIVE
RBC # BLD AUTO: 3.66 M/UL (ref 4–5.4)
SODIUM SERPL-SCNC: 139 MMOL/L (ref 136–145)
SP GR UR STRIP: 1 (ref 1–1.03)
URN SPEC COLLECT METH UR: NORMAL
WBC # BLD AUTO: 4.22 K/UL (ref 3.9–12.7)

## 2023-08-30 PROCEDURE — 81003 URINALYSIS AUTO W/O SCOPE: CPT | Performed by: EMERGENCY MEDICINE

## 2023-08-30 PROCEDURE — 96361 HYDRATE IV INFUSION ADD-ON: CPT

## 2023-08-30 PROCEDURE — 83690 ASSAY OF LIPASE: CPT | Performed by: EMERGENCY MEDICINE

## 2023-08-30 PROCEDURE — 81025 URINE PREGNANCY TEST: CPT | Performed by: PHYSICIAN ASSISTANT

## 2023-08-30 PROCEDURE — 99284 EMERGENCY DEPT VISIT MOD MDM: CPT

## 2023-08-30 PROCEDURE — 96374 THER/PROPH/DIAG INJ IV PUSH: CPT

## 2023-08-30 PROCEDURE — 85025 COMPLETE CBC W/AUTO DIFF WBC: CPT | Performed by: EMERGENCY MEDICINE

## 2023-08-30 PROCEDURE — 63600175 PHARM REV CODE 636 W HCPCS: Performed by: EMERGENCY MEDICINE

## 2023-08-30 PROCEDURE — 82550 ASSAY OF CK (CPK): CPT | Performed by: EMERGENCY MEDICINE

## 2023-08-30 PROCEDURE — 80053 COMPREHEN METABOLIC PANEL: CPT | Performed by: EMERGENCY MEDICINE

## 2023-08-30 RX ORDER — LORAZEPAM 2 MG/ML
1 INJECTION INTRAMUSCULAR
Status: COMPLETED | OUTPATIENT
Start: 2023-08-30 | End: 2023-08-30

## 2023-08-30 RX ADMIN — LORAZEPAM 1 MG: 2 INJECTION INTRAMUSCULAR; INTRAVENOUS at 04:08

## 2023-08-30 RX ADMIN — SODIUM CHLORIDE, POTASSIUM CHLORIDE, SODIUM LACTATE AND CALCIUM CHLORIDE 1000 ML: 600; 310; 30; 20 INJECTION, SOLUTION INTRAVENOUS at 04:08

## 2023-08-30 NOTE — ED PROVIDER NOTES
History:  Patsy Akins is a 33 y.o. female who presents to the ED with Weakness  (Patient believes she is dehydrated, took a lot of laxatives, scared she is going to have a seizure. No hx of seizures. )    Described as 33-year-old female with a history of bipolar disorder, GERD presenting to the emergency department with generalized weakness.  She reports she has been taking a stool softener, MiraLax, and Metamucil since yesterday and began to feel shaky with visual flashes as though she was going to have a seizure.  She then reports she laid in bed and had all-over body shaking, which she can recall, no LOC, that she reports was brief.  She was felt tremulous since that time.  She denies alcohol use.  She denies any diarrhea.  She had a bowel movement yesterday morning which was normal.  She believes she was constipated because she has abdominal bloating though denies any nausea or vomiting.  She has been compliant on all of her medications.    Review of Systems: All systems reviewed and are negative except as per history of present illness.    Medications:   Discharge Medication List as of 8/30/2023  5:18 AM        CONTINUE these medications which have NOT CHANGED    Details   buPROPion (WELLBUTRIN SR) 150 MG TBSR 12 hr tablet TAKE 2 TABLETS BY MOUTH EVERY MORNING AND TAKE 1 TABLET EVERY EVENING, Historical Med      hydrOXYzine HCL (ATARAX) 25 MG tablet Take 1 tablet (25 mg total) by mouth 3 (three) times daily as needed for Anxiety., Starting Wed 9/22/2021, Normal      LORazepam (ATIVAN) 0.5 MG tablet Take 1 tablet (0.5 mg total) by mouth every 6 (six) hours as needed for Anxiety., Starting Thu 1/21/2021, Until Tue 5/18/2021 at 2359, No Print      melatonin (MELATIN) 5 mg Take by mouth., Historical Med      omeprazole (PRILOSEC) 20 MG capsule Take 1 capsule (20 mg total) by mouth once daily., Starting Mon 1/30/2023, Normal      OXcarbazepine (TRILEPTAL) 300 MG Tab Take 0.5 tablets (150 mg total) by mouth 2  (two) times daily., Starting Mon 8/14/2023, Normal      potassium chloride SA (K-DUR,KLOR-CON) 10 MEQ tablet Take 1 tablet (10 mEq total) by mouth once daily., Starting Tue 4/20/2021, Normal      topiramate (TOPAMAX) 200 MG Tab TAKE 1 TABLET BY MOUTH EVERY EVENING, Normal      vortioxetine (TRINTELLIX) 10 mg Tab Take 10 mg by mouth., Historical Med             PMH:   Past Medical History:   Diagnosis Date    Bipolar affective     Bipolar disorder with depression     Anxiety disorder    Chronic back pain     Hypokalemia     Irritable bowel syndrome      PSH: No past surgical history on file.  Allergies: She is allergic to lamotrigine.  Social History: Marital Status: single. She  reports that she has never smoked. She has never used smokeless tobacco.. She  reports that she does not currently use alcohol..       Exam:  VITAL SIGNS:   Vitals:    08/30/23 0340 08/30/23 0400 08/30/23 0430 08/30/23 0500   BP: 102/63 97/62 96/61 110/68   Pulse: 92 91 85 85   Resp: 16  16 16   Temp:    98.8 °F (37.1 °C)   TempSrc:       SpO2: 100% 100% 100% 100%   Weight:         Const: Awake and alert, NAD   Head: Atraumatic  Eyes: Normal Conjunctiva  ENT: Normal External Ears, Nose and Mouth.  Dry mucous membranes  Neck: Full range of motion. No meningismus.  Resp: Normal respiratory effort, No distress, CTAB  Cardio: Equal and intact distal pulses, RRR  Abd: Soft, non tender, non distended.  No rebound or guarding.    Skin: No petechiae or rashes  Ext: No cyanosis, or edema  Neur: Awake and alert, GCS 15, strength and sensation intact, cranial nerves intact  Psych:  Anxious appearing    Data:  Results for orders placed or performed during the hospital encounter of 08/30/23   CBC auto differential   Result Value Ref Range    WBC 4.22 3.90 - 12.70 K/uL    RBC 3.66 (L) 4.00 - 5.40 M/uL    Hemoglobin 8.6 (L) 12.0 - 16.0 g/dL    Hematocrit 28.7 (L) 37.0 - 48.5 %    MCV 78 (L) 82 - 98 fL    MCH 23.5 (L) 27.0 - 31.0 pg    MCHC 30.0 (L) 32.0 -  36.0 g/dL    RDW 16.0 (H) 11.5 - 14.5 %    Platelets 216 150 - 450 K/uL    MPV 8.2 (L) 9.2 - 12.9 fL    Immature Granulocytes 0.0 0.0 - 0.5 %    Gran # (ANC) 2.2 1.8 - 7.7 K/uL    Immature Grans (Abs) 0.00 0.00 - 0.04 K/uL    Lymph # 1.5 1.0 - 4.8 K/uL    Mono # 0.4 0.3 - 1.0 K/uL    Eos # 0.1 0.0 - 0.5 K/uL    Baso # 0.04 0.00 - 0.20 K/uL    nRBC 0 0 /100 WBC    Gran % 51.2 38.0 - 73.0 %    Lymph % 36.5 18.0 - 48.0 %    Mono % 9.5 4.0 - 15.0 %    Eosinophil % 1.9 0.0 - 8.0 %    Basophil % 0.9 0.0 - 1.9 %    Differential Method Automated    Comprehensive metabolic panel   Result Value Ref Range    Sodium 139 136 - 145 mmol/L    Potassium 3.6 3.5 - 5.1 mmol/L    Chloride 114 (H) 95 - 110 mmol/L    CO2 17 (L) 23 - 29 mmol/L    Glucose 79 70 - 110 mg/dL    BUN 6 6 - 20 mg/dL    Creatinine 0.7 0.5 - 1.4 mg/dL    Calcium 8.2 (L) 8.7 - 10.5 mg/dL    Total Protein 5.7 (L) 6.0 - 8.4 g/dL    Albumin 3.3 (L) 3.5 - 5.2 g/dL    Total Bilirubin 0.2 0.1 - 1.0 mg/dL    Alkaline Phosphatase 48 (L) 55 - 135 U/L    AST 17 10 - 40 U/L    ALT 12 10 - 44 U/L    eGFR >60.0 >60 mL/min/1.73 m^2    Anion Gap 8 8 - 16 mmol/L   Urinalysis, Reflex to Urine Culture Urine, Clean Catch    Specimen: Urine   Result Value Ref Range    Specimen UA Urine, Clean Catch     Color, UA Straw Yellow, Straw, Grace    Appearance, UA Clear Clear    pH, UA 6.0 5.0 - 8.0    Specific Gravity, UA 1.005 1.005 - 1.030    Protein, UA Negative Negative    Glucose, UA Negative Negative    Ketones, UA Negative Negative    Bilirubin (UA) Negative Negative    Occult Blood UA Negative Negative    Nitrite, UA Negative Negative    Leukocytes, UA Negative Negative   CPK   Result Value Ref Range    CPK 56 20 - 180 U/L   Lipase   Result Value Ref Range    Lipase 23 4 - 60 U/L   POCT urine pregnancy   Result Value Ref Range    POC Preg Test, Ur Negative Negative     Acceptable Yes          Labs & Imaging studies were reviewed independently by me.     Medical  Decision Makin-year-old female presenting to the emergency department with a tremor and chronic constipation.  Her abdominal examination is benign. Doubt cholecystitis, appendicitis, pancreatitis, SBO, diverticulitis, or any other acute abdominal surgical emergency.  She has full recall of her seizure-like episode which was bilateral, doubt partial seizure, suspect psychogenic seizure versus tremor.  She had no tremor it was sleeping comfortably throughout her stay in the emergency room.  Basic laboratory studies show a mild non-anion gap metabolic acidosis.  She was given Ativan and IV fluids from likely mild dehydration.  She was also anemic, reports she has been anemic in the past.  She refused rectal examination.  She was encouraged to take iron supplements.  No active bleeding.  Laboratory studies otherwise unremarkable and vital signs remained stable.  Patient was stable for discharge home with outpatient Neurology and PCP follow up.    Clinical Impression:  1. Dehydration    2. Anemia, unspecified type    3. Tremor             Medication List        ASK your doctor about these medications      buPROPion 150 MG TBSR 12 hr tablet  Commonly known as: WELLBUTRIN SR     hydrOXYzine HCL 25 MG tablet  Commonly known as: ATARAX  Take 1 tablet (25 mg total) by mouth 3 (three) times daily as needed for Anxiety.     LORazepam 0.5 MG tablet  Commonly known as: ATIVAN  Take 1 tablet (0.5 mg total) by mouth every 6 (six) hours as needed for Anxiety.     melatonin 5 mg  Commonly known as: MELATIN     omeprazole 20 MG capsule  Commonly known as: PRILOSEC  Take 1 capsule (20 mg total) by mouth once daily.     OXcarbazepine 300 MG Tab  Commonly known as: TRILEPTAL  Take 0.5 tablets (150 mg total) by mouth 2 (two) times daily.     potassium chloride SA 10 MEQ tablet  Commonly known as: K-DUR,KLOR-CON M  Take 1 tablet (10 mEq total) by mouth once daily.     topiramate 200 MG Tab  Commonly known as: TOPAMAX  TAKE 1 TABLET  BY MOUTH EVERY EVENING     TRINTELLIX 10 mg Tab  Generic drug: vortioxetine              Follow-up Information       Ghanshyam Ramos MD.    Specialty: Internal Medicine  Contact information:  [address]  [place] [id number] [phone]                               Lucinda Kate MD  09/06/23 [id number]

## 2023-08-30 NOTE — ED TRIAGE NOTES
Patsy Akins, a 33 y.o. female presents to the ED w/ complaint of seizure.  Pt says she had a seizure yesterday at around 2200.  Pt says she has had seizures in the past and says this felt like her previous seizures. Pt says she thinks had the seizure because she took a mixture of Miramax, metamucil, and a stool softener.         Triage note:  Chief Complaint   Patient presents with    Weakness      Patient believes she is dehydrated, took a lot of laxatives, scared she is going to have a seizure. No hx of seizures.      Review of patient's allergies indicates:   Allergen Reactions    Lamotrigine Hives and Itching     Past Medical History:   Diagnosis Date    Bipolar affective     Bipolar disorder with depression     Anxiety disorder    Chronic back pain     Hypokalemia     Irritable bowel syndrome

## 2023-08-31 ENCOUNTER — PATIENT MESSAGE (OUTPATIENT)
Dept: INTERNAL MEDICINE | Facility: CLINIC | Age: 33
End: 2023-08-31
Payer: MEDICARE

## 2023-09-01 ENCOUNTER — LAB VISIT (OUTPATIENT)
Dept: LAB | Facility: HOSPITAL | Age: 33
End: 2023-09-01
Attending: INTERNAL MEDICINE
Payer: MEDICARE

## 2023-09-01 DIAGNOSIS — I10 ESSENTIAL HYPERTENSION: ICD-10-CM

## 2023-09-01 DIAGNOSIS — F31.9 BIPOLAR 1 DISORDER, DEPRESSED: ICD-10-CM

## 2023-09-01 DIAGNOSIS — K59.00 CONSTIPATION, UNSPECIFIED CONSTIPATION TYPE: ICD-10-CM

## 2023-09-01 LAB
BASOPHILS # BLD AUTO: 0.05 K/UL (ref 0–0.2)
BASOPHILS NFR BLD: 0.8 % (ref 0–1.9)
DIFFERENTIAL METHOD: ABNORMAL
EOSINOPHIL # BLD AUTO: 0.1 K/UL (ref 0–0.5)
EOSINOPHIL NFR BLD: 1.1 % (ref 0–8)
ERYTHROCYTE [DISTWIDTH] IN BLOOD BY AUTOMATED COUNT: 17.1 % (ref 11.5–14.5)
HCT VFR BLD AUTO: 33 % (ref 37–48.5)
HGB BLD-MCNC: 9.7 G/DL (ref 12–16)
IMM GRANULOCYTES # BLD AUTO: 0.01 K/UL (ref 0–0.04)
IMM GRANULOCYTES NFR BLD AUTO: 0.2 % (ref 0–0.5)
LYMPHOCYTES # BLD AUTO: 1.5 K/UL (ref 1–4.8)
LYMPHOCYTES NFR BLD: 23.4 % (ref 18–48)
MCH RBC QN AUTO: 24.1 PG (ref 27–31)
MCHC RBC AUTO-ENTMCNC: 29.4 G/DL (ref 32–36)
MCV RBC AUTO: 82 FL (ref 82–98)
MONOCYTES # BLD AUTO: 0.6 K/UL (ref 0.3–1)
MONOCYTES NFR BLD: 9.6 % (ref 4–15)
NEUTROPHILS # BLD AUTO: 4.3 K/UL (ref 1.8–7.7)
NEUTROPHILS NFR BLD: 64.9 % (ref 38–73)
NRBC BLD-RTO: 0 /100 WBC
PLATELET # BLD AUTO: 275 K/UL (ref 150–450)
PMV BLD AUTO: 8.9 FL (ref 9.2–12.9)
RBC # BLD AUTO: 4.02 M/UL (ref 4–5.4)
WBC # BLD AUTO: 6.57 K/UL (ref 3.9–12.7)

## 2023-09-01 PROCEDURE — 36415 COLL VENOUS BLD VENIPUNCTURE: CPT | Mod: PO | Performed by: INTERNAL MEDICINE

## 2023-09-01 PROCEDURE — 80053 COMPREHEN METABOLIC PANEL: CPT | Performed by: INTERNAL MEDICINE

## 2023-09-01 PROCEDURE — 85025 COMPLETE CBC W/AUTO DIFF WBC: CPT | Performed by: INTERNAL MEDICINE

## 2023-09-01 PROCEDURE — 84443 ASSAY THYROID STIM HORMONE: CPT | Performed by: INTERNAL MEDICINE

## 2023-09-02 LAB
ALBUMIN SERPL BCP-MCNC: 3.8 G/DL (ref 3.5–5.2)
ALP SERPL-CCNC: 64 U/L (ref 55–135)
ALT SERPL W/O P-5'-P-CCNC: 15 U/L (ref 10–44)
ANION GAP SERPL CALC-SCNC: 10 MMOL/L (ref 8–16)
ANION GAP SERPL CALC-SCNC: 10 MMOL/L (ref 8–16)
AST SERPL-CCNC: 20 U/L (ref 10–40)
BILIRUB SERPL-MCNC: 0.2 MG/DL (ref 0.1–1)
BUN SERPL-MCNC: 10 MG/DL (ref 6–20)
BUN SERPL-MCNC: 10 MG/DL (ref 6–20)
CALCIUM SERPL-MCNC: 8.8 MG/DL (ref 8.7–10.5)
CALCIUM SERPL-MCNC: 8.8 MG/DL (ref 8.7–10.5)
CHLORIDE SERPL-SCNC: 107 MMOL/L (ref 95–110)
CHLORIDE SERPL-SCNC: 107 MMOL/L (ref 95–110)
CO2 SERPL-SCNC: 20 MMOL/L (ref 23–29)
CO2 SERPL-SCNC: 20 MMOL/L (ref 23–29)
CREAT SERPL-MCNC: 0.8 MG/DL (ref 0.5–1.4)
CREAT SERPL-MCNC: 0.8 MG/DL (ref 0.5–1.4)
EST. GFR  (NO RACE VARIABLE): >60 ML/MIN/1.73 M^2
EST. GFR  (NO RACE VARIABLE): >60 ML/MIN/1.73 M^2
GLUCOSE SERPL-MCNC: 77 MG/DL (ref 70–110)
GLUCOSE SERPL-MCNC: 77 MG/DL (ref 70–110)
POTASSIUM SERPL-SCNC: 3.6 MMOL/L (ref 3.5–5.1)
POTASSIUM SERPL-SCNC: 3.6 MMOL/L (ref 3.5–5.1)
PROT SERPL-MCNC: 6.6 G/DL (ref 6–8.4)
SODIUM SERPL-SCNC: 137 MMOL/L (ref 136–145)
SODIUM SERPL-SCNC: 137 MMOL/L (ref 136–145)
TSH SERPL DL<=0.005 MIU/L-ACNC: 0.93 UIU/ML (ref 0.4–4)

## 2023-09-07 ENCOUNTER — PATIENT MESSAGE (OUTPATIENT)
Dept: INTERNAL MEDICINE | Facility: CLINIC | Age: 33
End: 2023-09-07
Payer: MEDICARE

## 2023-09-13 NOTE — TELEPHONE ENCOUNTER
"FYI     Pt has bm almost every day "though not much passes"    Pt seldom uses miralax; only uses when bloated  Pt has taken metamucil when a bm has not occurred  "

## 2023-10-16 ENCOUNTER — E-VISIT (OUTPATIENT)
Dept: INTERNAL MEDICINE | Facility: CLINIC | Age: 33
End: 2023-10-16
Payer: MEDICARE

## 2023-10-16 ENCOUNTER — PATIENT OUTREACH (OUTPATIENT)
Dept: ADMINISTRATIVE | Facility: OTHER | Age: 33
End: 2023-10-16
Payer: MEDICARE

## 2023-10-16 ENCOUNTER — NURSE TRIAGE (OUTPATIENT)
Dept: ADMINISTRATIVE | Facility: CLINIC | Age: 33
End: 2023-10-16
Payer: MEDICARE

## 2023-10-16 ENCOUNTER — HOSPITAL ENCOUNTER (EMERGENCY)
Facility: HOSPITAL | Age: 33
Discharge: HOME | End: 2023-10-16
Attending: EMERGENCY MEDICINE
Payer: MEDICARE

## 2023-10-16 VITALS
SYSTOLIC BLOOD PRESSURE: 93 MMHG | BODY MASS INDEX: 19.84 KG/M2 | WEIGHT: 112 LBS | HEART RATE: 91 BPM | OXYGEN SATURATION: 100 % | RESPIRATION RATE: 18 BRPM | DIASTOLIC BLOOD PRESSURE: 51 MMHG | TEMPERATURE: 99 F | HEIGHT: 63 IN

## 2023-10-16 DIAGNOSIS — D64.9 ANEMIA, UNSPECIFIED TYPE: ICD-10-CM

## 2023-10-16 DIAGNOSIS — K92.1 BLACK STOOL: ICD-10-CM

## 2023-10-16 DIAGNOSIS — R19.7 DIARRHEA, UNSPECIFIED TYPE: Primary | ICD-10-CM

## 2023-10-16 DIAGNOSIS — R11.0 NAUSEA: ICD-10-CM

## 2023-10-16 LAB
ALBUMIN SERPL BCP-MCNC: 3.9 G/DL (ref 3.5–5.2)
ALP SERPL-CCNC: 55 U/L (ref 55–135)
ALT SERPL W/O P-5'-P-CCNC: 13 U/L (ref 10–44)
ANION GAP SERPL CALC-SCNC: 9 MMOL/L (ref 8–16)
AST SERPL-CCNC: 17 U/L (ref 10–40)
B-HCG UR QL: NEGATIVE
BASOPHILS # BLD AUTO: 0.03 K/UL (ref 0–0.2)
BASOPHILS NFR BLD: 0.4 % (ref 0–1.9)
BILIRUB SERPL-MCNC: 0.4 MG/DL (ref 0.1–1)
BILIRUB UR QL STRIP: NEGATIVE
BUN SERPL-MCNC: 13 MG/DL (ref 6–20)
CALCIUM SERPL-MCNC: 9.4 MG/DL (ref 8.7–10.5)
CHLORIDE SERPL-SCNC: 106 MMOL/L (ref 95–110)
CLARITY UR: CLEAR
CO2 SERPL-SCNC: 19 MMOL/L (ref 23–29)
COLOR UR: YELLOW
CREAT SERPL-MCNC: 0.8 MG/DL (ref 0.5–1.4)
CTP QC/QA: YES
DIFFERENTIAL METHOD: ABNORMAL
EOSINOPHIL # BLD AUTO: 0.2 K/UL (ref 0–0.5)
EOSINOPHIL NFR BLD: 3.2 % (ref 0–8)
ERYTHROCYTE [DISTWIDTH] IN BLOOD BY AUTOMATED COUNT: 17.5 % (ref 11.5–14.5)
EST. GFR  (NO RACE VARIABLE): >60 ML/MIN/1.73 M^2
GLUCOSE SERPL-MCNC: 86 MG/DL (ref 70–110)
GLUCOSE UR QL STRIP: NEGATIVE
HCT VFR BLD AUTO: 34.6 % (ref 37–48.5)
HGB BLD-MCNC: 10.6 G/DL (ref 12–16)
HGB UR QL STRIP: NEGATIVE
IMM GRANULOCYTES # BLD AUTO: 0.01 K/UL (ref 0–0.04)
IMM GRANULOCYTES NFR BLD AUTO: 0.1 % (ref 0–0.5)
KETONES UR QL STRIP: NEGATIVE
LEUKOCYTE ESTERASE UR QL STRIP: NEGATIVE
LYMPHOCYTES # BLD AUTO: 1.2 K/UL (ref 1–4.8)
LYMPHOCYTES NFR BLD: 16 % (ref 18–48)
MCH RBC QN AUTO: 24 PG (ref 27–31)
MCHC RBC AUTO-ENTMCNC: 30.6 G/DL (ref 32–36)
MCV RBC AUTO: 78 FL (ref 82–98)
MONOCYTES # BLD AUTO: 0.5 K/UL (ref 0.3–1)
MONOCYTES NFR BLD: 6.5 % (ref 4–15)
NEUTROPHILS # BLD AUTO: 5.3 K/UL (ref 1.8–7.7)
NEUTROPHILS NFR BLD: 73.8 % (ref 38–73)
NITRITE UR QL STRIP: NEGATIVE
NRBC BLD-RTO: 0 /100 WBC
PH UR STRIP: 7 [PH] (ref 5–8)
PLATELET # BLD AUTO: 252 K/UL (ref 150–450)
PMV BLD AUTO: 8.4 FL (ref 9.2–12.9)
POTASSIUM SERPL-SCNC: 3.3 MMOL/L (ref 3.5–5.1)
PROT SERPL-MCNC: 7.1 G/DL (ref 6–8.4)
PROT UR QL STRIP: NEGATIVE
RBC # BLD AUTO: 4.42 M/UL (ref 4–5.4)
SODIUM SERPL-SCNC: 134 MMOL/L (ref 136–145)
SP GR UR STRIP: 1.01 (ref 1–1.03)
URN SPEC COLLECT METH UR: NORMAL
UROBILINOGEN UR STRIP-ACNC: NEGATIVE EU/DL
WBC # BLD AUTO: 7.24 K/UL (ref 3.9–12.7)

## 2023-10-16 PROCEDURE — 96361 HYDRATE IV INFUSION ADD-ON: CPT

## 2023-10-16 PROCEDURE — 81003 URINALYSIS AUTO W/O SCOPE: CPT | Performed by: EMERGENCY MEDICINE

## 2023-10-16 PROCEDURE — 25000003 PHARM REV CODE 250: Performed by: EMERGENCY MEDICINE

## 2023-10-16 PROCEDURE — 96360 HYDRATION IV INFUSION INIT: CPT | Mod: 59

## 2023-10-16 PROCEDURE — 25500020 PHARM REV CODE 255: Performed by: EMERGENCY MEDICINE

## 2023-10-16 PROCEDURE — 85025 COMPLETE CBC W/AUTO DIFF WBC: CPT | Performed by: EMERGENCY MEDICINE

## 2023-10-16 PROCEDURE — 80053 COMPREHEN METABOLIC PANEL: CPT | Performed by: EMERGENCY MEDICINE

## 2023-10-16 PROCEDURE — 99421 OL DIG E/M SVC 5-10 MIN: CPT | Mod: ,,, | Performed by: INTERNAL MEDICINE

## 2023-10-16 PROCEDURE — 81025 URINE PREGNANCY TEST: CPT | Performed by: EMERGENCY MEDICINE

## 2023-10-16 PROCEDURE — 99285 EMERGENCY DEPT VISIT HI MDM: CPT | Mod: 25

## 2023-10-16 PROCEDURE — 99421 PR E&M, ONLINE DIGIT, EST, < 7 DAYS, 5-10 MINS: ICD-10-PCS | Mod: ,,, | Performed by: INTERNAL MEDICINE

## 2023-10-16 RX ORDER — ONDANSETRON 4 MG/1
4 TABLET, ORALLY DISINTEGRATING ORAL EVERY 8 HOURS PRN
Qty: 15 TABLET | Refills: 0 | Status: SHIPPED | OUTPATIENT
Start: 2023-10-16

## 2023-10-16 RX ADMIN — SODIUM CHLORIDE 1000 ML: 9 INJECTION, SOLUTION INTRAVENOUS at 12:10

## 2023-10-16 RX ADMIN — IOHEXOL 75 ML: 350 INJECTION, SOLUTION INTRAVENOUS at 01:10

## 2023-10-16 RX ADMIN — SODIUM CHLORIDE 1000 ML: 9 INJECTION, SOLUTION INTRAVENOUS at 01:10

## 2023-10-16 RX ADMIN — SODIUM CHLORIDE 1000 ML: 9 INJECTION, SOLUTION INTRAVENOUS at 04:10

## 2023-10-16 NOTE — PROGRESS NOTES
Patient ID: Patsy Akins is a 33 y.o. female.    Chief Complaint: GI Problem (Entered automatically based on patient selection in Patient Portal.)    The patient initiated a request through [employer] on 10/16/2023 for evaluation and management with a chief complaint of GI Problem (Entered automatically based on patient selection in Patient Portal.)     I evaluated the questionnaire submission on 10/16/23.    [facility] Evisit Diarrhea    10/16/2023  1:35 AM CDT - Filed by Patient   Do you agree to participate in an E-Visit? Yes   If you have any of the following symptoms, please present to your local ER or call 911:  I acknowledge   What is the main issue that you would like for your doctor to address today? [place] and vomiting   Are you able to take your vital signs? No   Are you currently pregnant, could you be pregnant, or are you breast feeding? None of the above   Do you have diarrhea? Yes   How many stools have you passed in the last 24 hours? One to four   Is there blood in your stool, or is your stool dark red or black? My stool is black   Does your stool contain pus or mucus? No   Have you taken a laxative or a medicine to help you move your bowels lately? Yes   Are you vomiting? Yes, I am vomiting occasionally   Are you able to keep down fluids? Yes, I can keep down some fluids.   Do you have belly pain? I have a little pain or no pain   Are you feeling dizzy or like you might pass out? No   When did your symptoms begin? 10/15/2023   Do you have a fever? I do not know   Are you having trouble walking or lifting yourself due to weakness from this illness?  Yes   Do any of the following apply to you? My urine is normal   Did your condition begin after a specific meal that may have caused the illness? Not clearly related to a meal.    Have you taken antibiotics recently? I am not sure   Have you been hospitalized in the past 2 months? Yes, I was hospitalized in the last 2 months   Please enter when and where  you were in the hospital. Early September (2023).   Do you work in a  center or healthcare environment? No   Does anyone you know have similar symptoms? No   Have you had a meal consisting of raw meat or fish in the week prior to your illness? No   Have you recently travelled to a place where you may have caught an illness? No   Have you tried any medication or other treatment for your symptoms? No   Provide any information you feel is important to your history not asked above IBS/spastic collon / anemia. Been taking metamucil regularly. Recently mild constipation. Just got better yesterday, then this started.   Please attach any relevant images or files           Active Problem List with Overview Notes    Diagnosis Date Noted    Chronic bilateral thoracic back pain 08/01/2023    Arm weakness 08/01/2023    Chronic back pain     Gastroesophageal reflux disease without esophagitis 11/16/2019    Essential hypertension 11/16/2019    Bipolar 1 disorder, depressed 11/15/2019      Recent Labs Obtained:  No visits with results within 7 Day(s) from this visit.   Latest known visit with results is:   Lab Visit on 09/01/2023   Component Date Value Ref Range Status    Sodium 09/01/2023 137  136 - 145 mmol/L Final    Potassium 09/01/2023 3.6  3.5 - 5.1 mmol/L Final    Chloride 09/01/2023 107  95 - 110 mmol/L Final    CO2 09/01/2023 20 (L)  23 - 29 mmol/L Final    Glucose 09/01/2023 77  70 - 110 mg/dL Final    BUN 09/01/2023 10  6 - 20 mg/dL Final    Creatinine 09/01/2023 0.8  0.5 - 1.4 mg/dL Final    Calcium 09/01/2023 8.8  8.7 - 10.5 mg/dL Final    Total Protein 09/01/2023 6.6  6.0 - 8.4 g/dL Final    Albumin 09/01/2023 3.8  3.5 - 5.2 g/dL Final    Total Bilirubin 09/01/2023 0.2  0.1 - 1.0 mg/dL Final    Comment: For infants and newborns, interpretation of results should be based  on gestational age, weight and in agreement with clinical  observations.    Premature Infant recommended reference ranges:  Up to 24  hours.............<8.0 mg/dL  Up to 48 hours............<12.0 mg/dL  3-5 days..................<15.0 mg/dL  6-29 days.................<15.0 mg/dL      Alkaline Phosphatase 09/01/2023 64  55 - 135 U/L Final    AST 09/01/2023 20  10 - 40 U/L Final    ALT 09/01/2023 15  10 - 44 U/L Final    eGFR 09/01/2023 >60.0  >60 mL/min/1.73 m^2 Final    Anion Gap 09/01/2023 10  8 - 16 mmol/L Final    WBC 09/01/2023 6.57  3.90 - 12.70 K/uL Final    RBC 09/01/2023 4.02  4.00 - 5.40 M/uL Final    Hemoglobin 09/01/2023 9.7 (L)  12.0 - 16.0 g/dL Final    Hematocrit 09/01/2023 33.0 (L)  37.0 - 48.5 % Final    MCV 09/01/2023 82  82 - 98 fL Final    MCH 09/01/2023 24.1 (L)  27.0 - 31.0 pg Final    MCHC 09/01/2023 29.4 (L)  32.0 - 36.0 g/dL Final    RDW 09/01/2023 17.1 (H)  11.5 - 14.5 % Final    Platelets 09/01/2023 275  150 - 450 K/uL Final    MPV 09/01/2023 8.9 (L)  9.2 - 12.9 fL Final    Immature Granulocytes 09/01/2023 0.2  0.0 - 0.5 % Final    Gran # (ANC) 09/01/2023 4.3  1.8 - 7.7 K/uL Final    Immature Grans (Abs) 09/01/2023 0.01  0.00 - 0.04 K/uL Final    Comment: Mild elevation in immature granulocytes is non specific and   can be seen in a variety of conditions including stress response,   acute inflammation, trauma and pregnancy. Correlation with other   laboratory and clinical findings is essential.      Lymph # 09/01/2023 1.5  1.0 - 4.8 K/uL Final    Mono # 09/01/2023 0.6  0.3 - 1.0 K/uL Final    Eos # 09/01/2023 0.1  0.0 - 0.5 K/uL Final    Baso # 09/01/2023 0.05  0.00 - 0.20 K/uL Final    nRBC 09/01/2023 0  0 /100 WBC Final    Gran % 09/01/2023 64.9  38.0 - 73.0 % Final    Lymph % 09/01/2023 23.4  18.0 - 48.0 % Final    Mono % 09/01/2023 9.6  4.0 - 15.0 % Final    Eosinophil % 09/01/2023 1.1  0.0 - 8.0 % Final    Basophil % 09/01/2023 0.8  0.0 - 1.9 % Final    Differential Method 09/01/2023 Automated   Final    Sodium 09/01/2023 137  136 - 145 mmol/L Final    Potassium 09/01/2023 3.6  3.5 - 5.1 mmol/L Final    Chloride  09/01/2023 107  95 - 110 mmol/L Final    CO2 09/01/2023 20 (L)  23 - 29 mmol/L Final    Glucose 09/01/2023 77  70 - 110 mg/dL Final    BUN 09/01/2023 10  6 - 20 mg/dL Final    Creatinine 09/01/2023 0.8  0.5 - 1.4 mg/dL Final    Calcium 09/01/2023 8.8  8.7 - 10.5 mg/dL Final    Anion Gap 09/01/2023 10  8 - 16 mmol/L Final    eGFR 09/01/2023 >60.0  >60 mL/min/1.73 m^2 Final    TSH 09/01/2023 0.927  0.400 - 4.000 uIU/mL Final       Encounter Diagnoses   Name Primary?    Diarrhea, unspecified type Yes    Black stool     Anemia, unspecified type         No orders of the defined types were placed in this encounter.           E-Visit Time Tracking:    Day 1 Time (in minutes): 7     Total Time (in minutes): 7

## 2023-10-16 NOTE — ED PROVIDER NOTES
"Encounter Date: 10/16/2023       History     Chief Complaint   Patient presents with    Diarrhea     Pt reports to the ED [facility] with C/O diarrhea and vomiting that started yesterday and has subsided. Pt reports "I had some black tarry diarrhea yesterday and I was throwing up. I talked to my doctor and they instructed me to come to the ER." Pt reports some Bi lateral lower ABD pain yesterday. Pt reports the symptoms stopped yesterday around 1800. Pt reports being able to tolerate liquids today. Pt with Hx of ibs and anemia. Pt placed in [facility] bed for eval.      33F with IBS, anemia, and bipolar disorder presents for evaluation after several episodes of diarrhea yesterday. She had 2 normal BMs yesterday then had brunch. Afterwards she began having profuse watery diarrhea with abdominal cramping and nausea/vomiting. Symptoms are resolved today but she has not eaten anything for fear of symptoms returning. She told her PCP about her symptoms yesterday and noted dark stools, so she was advised to go to the ER. She reports having constipation recently so has been taking metamucil.      Review of patient's allergies indicates:   Allergen Reactions    Lamotrigine Hives and Itching     Past Medical History:   Diagnosis Date    Bipolar affective     Bipolar disorder with depression     Anxiety disorder    Chronic back pain     Hypokalemia     Irritable bowel syndrome      History reviewed. No pertinent surgical history.  Family History   Problem Relation Age of Onset    Mental illness Mother     Alcohol abuse Mother     Depression Mother     Alcohol abuse Father     Depression Father     Alcohol abuse Maternal Aunt     Alcohol abuse Maternal Grandmother     Arthritis Maternal Grandmother     Stroke Maternal Grandmother     Asthma Brother     Depression Brother     Learning disabilities Brother     Mental illness Brother     Depression Paternal Grandmother     Diabetes Paternal Grandmother     Stroke Paternal Grandmother     " Mental illness Paternal Aunt     Miscarriages / Stillbirths Paternal Aunt     Stroke Paternal Grandfather      Social History     Tobacco Use    Smoking status: Never    Smokeless tobacco: Never    Tobacco comments:     Don't like it.   Substance Use Topics    Alcohol use: Not Currently     Alcohol/week: 0.0 standard drinks of alcohol     Comment: Don't drink.    Drug use: No     Review of Systems   Constitutional:  Negative for activity change, appetite change, chills and fever.   HENT:  Negative for congestion, rhinorrhea, sneezing and sore throat.    Respiratory:  Negative for cough, choking, shortness of breath and wheezing.    Cardiovascular:  Negative for chest pain and palpitations.   Gastrointestinal:  Positive for abdominal pain, diarrhea, nausea and vomiting.   Neurological:  Negative for dizziness, syncope, light-headedness and headaches.   All other systems reviewed and are negative.      Physical Exam     Initial Vitals [10/16/23 1140]   BP Pulse Resp Temp SpO2   110/64 105 18 98.5 °F (36.9 °C) 99 %      MAP       --         Physical Exam    Nursing note and vitals reviewed.  Constitutional: No distress.   Thin and pale   HENT:   Head: Normocephalic and atraumatic.   Dry mucus membranes   Eyes: Conjunctivae are normal.   Neck:   Normal range of motion.  Cardiovascular:  Normal rate, regular rhythm and normal heart sounds.           No murmur heard.  Pulmonary/Chest: Breath sounds normal. No respiratory distress.   Abdominal: Abdomen is soft. Bowel sounds are normal. She exhibits no distension and no mass. There is no abdominal tenderness. There is no rebound and no guarding.   Musculoskeletal:         General: No tenderness or edema. Normal range of motion.      Cervical back: Normal range of motion.     Neurological: She is alert and oriented to person, place, and time. GCS score is 15. GCS eye subscore is 4. GCS verbal subscore is 5. GCS motor subscore is 6.   Skin: Skin is warm and dry.    Psychiatric: She has a normal mood and affect. Her behavior is normal.         ED Course   Procedures  Labs Reviewed   CBC W/ AUTO DIFFERENTIAL - Abnormal; Notable for the following components:       Result Value    Hemoglobin 10.6 (*)     Hematocrit 34.6 (*)     MCV 78 (*)     MCH 24.0 (*)     MCHC 30.6 (*)     RDW 17.5 (*)     MPV 8.4 (*)     Gran % 73.8 (*)     Lymph % 16.0 (*)     All other components within normal limits   COMPREHENSIVE METABOLIC PANEL - Abnormal; Notable for the following components:    Sodium 134 (*)     Potassium 3.3 (*)     CO2 19 (*)     All other components within normal limits   URINALYSIS   POCT URINE PREGNANCY          Imaging Results               CT Abdomen Pelvis With Contrast (Final result)  Result time 10/16/23 15:17:34      Final result by Berry Gottlieb MD (10/16/23 15:17:34)                   Impression:      This report was flagged in Epic as abnormal.    1. There are scattered fluid-filled distal small bowel loops, noting slight indistinct shin about a few of the loops.  Findings may reflect infectious or inflammatory enteritis in the setting of diarrheal illness given liquid stool within the right colon.  Correlation is advised.  2. Findings suggesting hepatic steatosis, correlation with LFTs recommended.  3. No findings to suggest obstructive uropathy.  4. Please see above for several additional findings.      Electronically signed by: Berry Gottlieb MD  Date:    10/16/2023  Time:    15:17               Narrative:    EXAMINATION:  CT ABDOMEN PELVIS WITH CONTRAST    CLINICAL HISTORY:  Abdominal pain, acute, nonlocalized;    TECHNIQUE:  Low dose axial images, sagittal and coronal reformations were obtained from the lung bases to the pubic symphysis following the IV administration of 75 mL of Omnipaque 350 .  Oral contrast was not given.    COMPARISON:  None.    FINDINGS:  Images of the lower thorax are unremarkable.    The liver is hypoattenuating, may reflect  steatosis, correlation with LFTs recommended.  There is a subcentimeter low attenuating lesion within the right hepatic lobe, too small for characterization.  The spleen, pancreas, gallbladder and adrenal glands are grossly unremarkable.  The portal vein, splenic vein, SMV, celiac axis and SMA all are patent.  No significant abdominal lymphadenopathy.    The kidneys enhance symmetrically without hydronephrosis or nephrolithiasis.  The bilateral ureters are unable to be followed in their entirety to the urinary bladder, no definite calculi seen or secondary findings to suggest obstructive uropathy.  The urinary bladder is distended without wall thickening.  The uterus and right adnexa are unremarkable.  There is a dominant follicle or small cyst within the left ovary.  No significant free fluid in the pelvis.    There is moderate to large amount of stool throughout the colon noting liquid content within the right colon.  The terminal ileum is unremarkable.  The appendix is not confidently identified, no pericecal inflammation.  There are a few scattered fluid-filled small bowel loops, particularly involving the distal ileum noting some degree of mild wall thickening and indistinctness about a few of these loops.  There are several scattered shotty periaortic, pericaval, and mesenteric lymph nodes.  No focal organized pelvic fluid collection.    There are degenerative changes of the spine.  No significant inguinal lymphadenopathy.                                       Medications   sodium chloride 0.9% bolus 1,000 mL 1,000 mL (0 mLs Intravenous Stopped 10/16/23 1354)   sodium chloride 0.9% bolus 1,000 mL 1,000 mL (0 mLs Intravenous Stopped 10/16/23 1556)   iohexoL (OMNIPAQUE 350) injection 75 mL (75 mLs Intravenous Given 10/16/23 1334)   sodium chloride 0.9% bolus 1,000 mL 1,000 mL (1,000 mLs Intravenous New Bag 10/16/23 1600)     Medical Decision Making  33F with IBS, anemia, and bipolar disorder presents for  evaluation after several episodes of diarrhea yesterday. She had 2 normal BMs yesterday then had brunch. Afterwards she began having profuse watery diarrhea with abdominal cramping and nausea/vomiting. Symptoms are resolved today but she has not eaten anything for fear of symptoms returning. She told her PCP about her symptoms yesterday and noted dark stools, so she was advised to go to the ER. She reports having constipation recently so has been taking metamucil.    On exam, she appears dehydrated but has no focal abd ttp and no peritoneal signs. In shared decision making with pt, will orders labs, IVFs, and CT abd/pelvis. Ct shows fluid filled loops, consistent with enteritis. Pt has no fever, leukocytosis, or peritoneal signs. I do not feel she needs antibiotics. She reports hx of overflow diarrhea and I feel this is same. She is tolerating PO in ER. Will discharge with diet guidelines. Return precautions given.     Amount and/or Complexity of Data Reviewed  Labs: ordered.  Radiology: ordered.    Risk  Prescription drug management.                               Clinical Impression:   Final diagnoses:  [R19.7] Diarrhea, unspecified type (Primary)  [R11.0] Nausea  [D64.9] Anemia, unspecified type        ED Disposition Condition    Discharge Stable          ED Prescriptions       Medication Sig Dispense Start Date End Date Auth. Provider    ondansetron (ZOFRAN-ODT) 4 MG TbDL Take 1 tablet (4 mg total) by mouth every 8 (eight) hours as needed (nausea/vomiting). 15 tablet 10/16/2023 -- Yary Torres MD          Follow-up Information       Follow up With Specialties Details Why Contact [facility] - Emergency Dept Emergency Medicine  If symptoms worsen [id number]  [facility] [id number] [phone]             Yary Torres MD  10/16/23 5836

## 2023-10-16 NOTE — ED TRIAGE NOTES
Pt presents to ED via EMS with complaint of vomiting, diarrhea, and an episode of black tarry stool yesterday. All symptoms have subside but pt is concern of the black tarry stool yesterday. Pt is able to tolerate liquids today. Pt denies any complaints at the moment. Side rails up x2 with call light within reach,

## 2023-10-16 NOTE — PROGRESS NOTES
CHW - Initial Contact    This Community Health Worker completed the Social Determinant of Health questionnaire with patient  at bedside  today.    Pt identified barriers of most importance are: pt has no needs at this time.   Referrals to community agencies completed with patient/caregiver consent outside of [facility] include: no: none at this time.  Referrals were put through [facility] - no: none at this time.  Support and Services: has no support at this time.  Other information discussed the patient needs / wants help with: Completed SDOH with patient at bedside today, patient has no needs at this, will follow up in one week to check on pt's well-being and possible case closure.   Follow up required: yes.  Follow-up Outreach - Due: 10/22/2023

## 2023-10-16 NOTE — TELEPHONE ENCOUNTER
OOC Rn   Patient calling to report yesterday had diarrhea and vomiting.   Has hxt IBS, and spastic colon.  Happened x 2  Dr. Ramos b/m   dark stool.   Yesterday.   At 1:30 pm.   And again that evening. At 6:00    Dr Matos advised her to go to ED. This morning.  For any new or worsening symptoms.  Call 911, needs transportation.    Reason for Disposition   Caller has already spoken with the PCP (or office), and has no further questions    Protocols used: No Contact or Duplicate Contact Call-A-OH

## 2023-10-16 NOTE — ED NOTES
PT BP 93/51. Pt asymptomatic. Pt denies dizziness upon standing. MD notified. MD instructed to proceed with discharge.

## 2023-10-16 NOTE — ED NOTES
Crackers and water given to pt for PO challenge. pt tolerated crackers and water well. PT denies feeling nauseous. MD notified.

## 2023-10-16 NOTE — DISCHARGE INSTRUCTIONS
Eat lots of fresh fruits and vegetables. Drink lots of clear fluids. Take zofran for nausea. If you have worsening symptoms, return to ER.

## 2023-10-16 NOTE — Clinical Note
"Patsy BONEIE" Mable was seen and treated in our emergency department on 10/16/2023.  She may return to work on 10/17/2023.       If you have any questions or concerns, please don't hesitate to call.      Yary Torres MD"

## 2023-10-17 ENCOUNTER — PATIENT MESSAGE (OUTPATIENT)
Dept: INTERNAL MEDICINE | Facility: CLINIC | Age: 33
End: 2023-10-17
Payer: MEDICARE

## 2023-10-17 DIAGNOSIS — M47.816 OSTEOARTHRITIS OF LUMBAR SPINE, UNSPECIFIED SPINAL OSTEOARTHRITIS COMPLICATION STATUS: ICD-10-CM

## 2023-10-17 DIAGNOSIS — M54.9 UPPER BACK PAIN: Primary | ICD-10-CM

## 2023-10-19 ENCOUNTER — PATIENT OUTREACH (OUTPATIENT)
Dept: EMERGENCY MEDICINE | Facility: HOSPITAL | Age: 33
End: 2023-10-19
Payer: MEDICARE

## 2023-10-19 NOTE — PROGRESS NOTES
Kay Wadsworth  ED Navigator  Emergency Department    Project: [facility] ED Navigator  Role: Community Health Worker    Date: 10/19/2023  Patient Name: Patsy Akins  MRN: [id number]  PCP: Ghanshyam Ramos MD    Assessment:     Patsy Akins is a 33 y.o. female who has presented to ED for diarrhea. Patient has visited the ED 2 times in the past 3 months. Patient did contact PCP.     ED Navigator Initial Assessment    ED Navigator Enrollment Documentation  Consent to Services  Does patient consent to completing the assessment?: Yes  Contact  Method of Initial Contact: Phone  Transportation  Does the patient have issues with Transportation?: Yes  Does the patient have transportation to and from healthcare appointments?: No  Can family, friends, or others help?: No  Lack of transportation to appts, pharmacy, etc.?: No  What is available in their region?: Medicaid Transportation  Insurance Coverage  Do you have coverage/adequate coverage?: Yes  Type/kind of coverage: Primary Cvg:  Medicare/Medicare Part A & B  Is patient able to afford co-pays/deductibles?: Yes  Is patient able to afford HME or supplies?: Yes  Does patient have an established [facility] PCP?: Yes  Able to access?: Yes  Does the patient have a lack of adequate coverage?: No  Specialist Appointment  Did the patient come to the ED to see a specialist?: No  Does the patient have a pending specialist referral?: No  Does the patient have a specialist appointment made?: No  PCP Follow Up Appointment  Has the patient had an appointment with a primary care provider in the past year?: Yes  Approximate date: 10/16/23  Provider: Ghanshyam Ramos MD  Does the patient have a follow up appontment with a PCP?: Yes  Upcoming appointment date: 11/1/23  Provider: Ghanshyam Ramos MD  When was the last time you saw your PCP?: 10/16/23  Medications  Is patient able to afford medication?: Yes  Is patient unable to get medication due to lack of transportation?:  No  Psychological  Does the patient have psycho-social concerns?: Yes  What concerns does the patient have?: Anxiety and/or Depression  Food  Does the patient have concerns about food?: No  Communication/Education  Does the patient have limited English proficiency/English not primary language?: No  Does patient have low literacy and/or low health literacy?: Yes  Does patient have concerns with care?: No  Does patient have dissatisfaction with care?: No  Other Financial Concerns  Does the patient have immediate financial distress?: No  Does the patient have general financial concerns?: No  Other Social Barriers/Concerns  Does the patient have any additional barriers or concerns?: Work  Primary Barrier  Barriers identified: Cognitive barrier (health literacy, language and communication, etc.)  Root Cause of ED Utilization: Patient Knowledge/Low Health Literacy  Plan to address Patient Knowledge/Low Health Literacy: Provided information for [facility] On [facility] 24/7 Nurse triage line [phone] or 1-866-[facility] ([phone])  Next steps: Provided Education, Scheduled Appointment/Referral  Scheduled Appointment Date: 10/25/23  Appointment Reminder Date: 10/24/23  Was education/educational materials provided surrounding PCP services/creating a medical home?: Yes Was education verbal or written?: Verbal     Was education/educational materials provided surrounding low cost, healthy foods?: Yes Was education verbal or written?: Verbal     Was education/educational materials provided surrounding other items? If so, use comment to explain.: Yes Was education verbal or written?: Verbal   Plan: Provided information for [facility] 24/7 Nurse triage line, [phone] or 1-866-[facility] ([phone])  Expected Date of Follow Up 1: 10/24/23  Additional Documentation: ED Navigator spoke with patient regarding her recent ED visit. Patient stated she is doing ok. Patient has been in touch with her PCP, Carlos Ramos, she ED  visit. PCP issued a referral [facility]. Assessment completed. Patient declined the need for resources, however agreed to assistance scheduling with [facility]. ED Navigator scheduled patient an appointment on 10/25/23 with [facility]. Patient will secure transportation with Medicaid or Medicare. Appointment reminder set.  Osielcristian Wadsworth          Social History     Socioeconomic History    Marital status: Single   Occupational History    Occupation: Disabled    Tobacco Use    Smoking status: Never    Smokeless tobacco: Never    Tobacco comments:     Don't like it.   Substance and Sexual Activity    Alcohol use: Not Currently     Alcohol/week: 0.0 standard drinks of alcohol     Comment: Don't drink.    Drug use: No    Sexual activity: Never   Social History Narrative    ** Merged History Encounter **         Single and no children.   On disability due to mental illness.      Social Determinants of Health     Financial Resource Strain: Low Risk  (10/19/2023)    Overall Financial Resource Strain (CARDIA)     Difficulty of Paying Living Expenses: Not hard at all   Food Insecurity: No Food Insecurity (10/19/2023)    Hunger Vital Sign     Worried About Running Out of Food in the Last Year: Never true     Ran Out of Food in the Last Year: Never true   Transportation Needs: Unmet Transportation Needs (10/19/2023)    PRAPARE - Transportation     Lack of Transportation (Medical): Yes     Lack of Transportation (Non-Medical): Yes   Physical Activity: Inactive (10/19/2023)    Exercise Vital Sign     Days of Exercise per Week: 0 days     Minutes of Exercise per Session: 0 min   Stress: No Stress Concern Present (10/19/2023)    [employer] [place] [employer] - Occupational Stress Questionnaire     Feeling of Stress : Only a little   Social Connections: Unknown (10/19/2023)    Social Connection and Isolation Panel [NHANES]     Frequency of Communication with Friends and Family:  Three times a week     Frequency of Social Gatherings with Friends and Family: Three times a week     Attends [employer] Services: Patient refused     Active Member of Clubs or Organizations: Patient refused     Attends Club or Organization Meetings: Never     Marital Status: Never    Recent Concern: Social Connections - Socially Isolated (10/16/2023)    Social Connection and Isolation Panel [NHANES]     Frequency of Communication with Friends and Family: More than three times a week     Frequency of Social Gatherings with Friends and Family: More than three times a week     Attends [employer] Services: Never     Active Member of Clubs or Organizations: No     Attends Club or Organization Meetings: Never     Marital Status: Never    Housing Stability: Low Risk  (10/19/2023)    Housing Stability Vital Sign     Unable to Pay for Housing in the Last Year: No     Number of Places Lived in the Last Year: 1     Unstable Housing in the Last Year: No       Plan:   ED Navigator spoke with patient regarding her recent ED visit. Patient stated she is doing ok. Patient has been in touch with her PCP, Carlos Ramos, she ED visit. PCP issued a referral to [facility]. Assessment completed. Patient declined the need for resources, however agreed to assistance scheduling with [facility]. ED Navigator scheduled patient an appointment on 10/25/23 with [facility] [place]. Patient will secure transportation with Medicaid or Medicare. Appointment reminder set.  Transportation Insecurity: Medicaid/Medicare transportation  Medicare Status: Enrolled  This patient has a Medicare coverage.    Appointment made with: Ghanshyam Ramos MD

## 2023-10-22 ENCOUNTER — NURSE TRIAGE (OUTPATIENT)
Dept: ADMINISTRATIVE | Facility: CLINIC | Age: 33
End: 2023-10-22
Payer: MEDICARE

## 2023-10-23 ENCOUNTER — OFFICE VISIT (OUTPATIENT)
Dept: INTERNAL MEDICINE | Facility: CLINIC | Age: 33
End: 2023-10-23
Payer: MEDICARE

## 2023-10-23 DIAGNOSIS — K59.00 CONSTIPATION, UNSPECIFIED CONSTIPATION TYPE: Primary | ICD-10-CM

## 2023-10-23 PROCEDURE — 99214 OFFICE O/P EST MOD 30 MIN: CPT | Mod: 95,,, | Performed by: INTERNAL MEDICINE

## 2023-10-23 PROCEDURE — 99214 PR OFFICE/OUTPT VISIT, EST, LEVL IV, 30-39 MIN: ICD-10-PCS | Mod: 95,,, | Performed by: INTERNAL MEDICINE

## 2023-10-23 RX ORDER — BISACODYL 10 MG
10 SUPPOSITORY, RECTAL RECTAL DAILY PRN
Qty: 4 SUPPOSITORY | Refills: 0 | Status: SHIPPED | OUTPATIENT
Start: 2023-10-23 | End: 2023-10-27

## 2023-10-23 NOTE — Clinical Note
Saw Ms. Akins for constipation. Was seen in ED for similar complaint 10/16 Recommend use of miralax BID x48 hours. If no BM in 48 hours can use bisacodyl suppository If this becomes a recurrent issue will need GI evaluation and consider trial of linzess type medication  Yvan

## 2023-10-23 NOTE — PROGRESS NOTES
The patient location is: [place]  The chief complaint leading to consultation is: constipation  Visit type: Virtual visit with synchronous audio and video  Total time spent with patient: 15 minutes  Each patient to whom he or she provides medical services by telemedicine is:  (1) informed of the relationship between the physician and patient and the respective role of any other health care provider with respect to management of the patient; and (2) notified that he or she may decline to receive medical services by telemedicine and may withdraw from such care at any time.      CHIEF COMPLAINT     No chief complaint on file.  TELEMEDICINE VISIT    HPI     Patsy Akins is 33 y.o. female here today for     Last BM 10/12. Sx felt like overflow diarrhea.    Constipation.   Tried metamucil  EOD miralax.    Hx of IBS-C.     Personally Reviewed Patient's Medical, surgical, family and social hx. Changes updated in [facility].  Care Team updated in Epic    Review of Systems:  Review of Systems   HENT:  Negative for hearing loss.    Eyes:  Negative for discharge.   Respiratory:  Negative for wheezing.    Cardiovascular:  Negative for chest pain and palpitations.   Gastrointestinal:  Positive for constipation. Negative for blood in stool, diarrhea and vomiting.   Genitourinary:  Negative for dysuria and hematuria.   Musculoskeletal:  Negative for neck pain.   Neurological:  Positive for weakness. Negative for headaches.   Endo/Heme/Allergies:  Positive for polydipsia.       Health Maintenance:   Reviewed with patient  Due for the following:      PHYSICAL EXAM       Gen: Well Appearing, NAD  HEENT: PERR, EOMI  Chest: Normal work of breathing,   Neuro: A&Ox3,  speech normal  Mood; Mood normal, behavior normal, thought process linear       LABS     Labs reviewed; Notable for    Chemistry        Component Value Date/Time     (L) 10/16/2023 1211    K 3.3 (L) 10/16/2023 1211     10/16/2023 1211    CO2 19 (L) 10/16/2023 1211     BUN 13 10/16/2023 1211    CREATININE 0.8 10/16/2023 1211    GLU 86 10/16/2023 1211        Component Value Date/Time    CALCIUM 9.4 10/16/2023 1211    ALKPHOS 55 10/16/2023 1211    AST 17 10/16/2023 1211    ALT 13 10/16/2023 1211    BILITOT 0.4 10/16/2023 1211    ESTGFRAFRICA >60 07/11/2022 1854    EGFRNONAA >60 07/11/2022 1854        There is moderate to large amount of stool throughout the colon noting liquid content within the right colon.  The terminal ileum is unremarkable.  The appendix is not confidently identified, no pericecal inflammation.  There are a few scattered fluid-filled small bowel loops, particularly involving the distal ileum noting some degree of mild wall thickening and indistinctness about a few of these loops.  There are several scattered shotty periaortic, pericaval, and mesenteric lymph nodes.  No focal organized pelvic fluid collection.    ASSESSMENT     1. Constipation, unspecified constipation type  bisacodyL (DULCOLAX) 10 mg Supp                Plan     Patsy Akins is a 33 y.o. female  1. Constipation, unspecified constipation type  - bisacodyL (DULCOLAX) 10 mg Supp; Place 1 suppository (10 mg total) rectally daily as needed (if no BM after 48 hours of miralax).  Dispense: 4 suppository; Refill: 0  Stool burden seen on ct, consider ileus with low/nl K if below measures not successful.  Recommend use of miralax BID x48 hours.  If no BM in 48 hours can use bisacodyl suppository  If this becomes a recurrent issue will need GI evaluation and consider trial of linzess type medication  Message sent to PCP    Jordan Dos Santos MD          Answers submitted by the patient for this visit:  Review of Systems Questionnaire (Submitted on 10/23/2023)  activity change: No  unexpected weight change: No  rhinorrhea: Yes  trouble swallowing: No  visual disturbance: No  chest tightness: No  polyuria: No  difficulty urinating: No  menstrual problem: No  joint swelling: No  arthralgias: No  confusion:  No  dysphoric mood: No

## 2023-10-23 NOTE — TELEPHONE ENCOUNTER
Reason for Disposition   Abdomen is more swollen than usual    Additional Information   Negative: [1] Vomiting AND [2] contains bile (green color)   Negative: Patient sounds very sick or weak to the triager   Negative: [1] Vomiting AND [2] abdomen looks much more swollen than usual   Negative: [1] Constant abdominal pain AND [2] present > 2 hours   Negative: [1] Rectal pain or fullness from fecal impaction (rectum full of stool) AND [2] NOT better after SITZ bath, suppository or enema   Negative: [1] Intermittent mild abdominal pain AND [2] fever    Protocols used: Constipation-A-AH  pt called re constipated since last Benigno 10/15. no BM since 10/15. pt asking what to do. was in ED. did CT scan.  has told pt to keep taking Metamucil q night and miralax prn. Taking miralax x 3-4 days. pt states she is tense. Trying to eat more greens and vegs. Eating reg diet but pt admits she doesn't eat a whole lot and eats slowly and eats little bits. Denies preg. denies abd pain. +bloat. +flatus. no N/V.no rectal bleeding. hx IBS. Rec to see dr within 24 hours. Pt agrees. Virtual appt set 10/23 ay [id number] at pt request. Protocol advice given.

## 2023-10-24 ENCOUNTER — PATIENT OUTREACH (OUTPATIENT)
Dept: EMERGENCY MEDICINE | Facility: HOSPITAL | Age: 33
End: 2023-10-24
Payer: MEDICARE

## 2023-10-24 NOTE — PROGRESS NOTES
ED Navigator phoned patient to remind her of upcoming appointment on 10/25/23 @ 2:20 with [facility]  [facility] [address], [address] [place] [address] [phone].  Kay Wadsworth

## 2023-10-24 NOTE — PROGRESS NOTES
ED Navigator phoned patient for follow-up. Patient had a PCP appointment and is scheduled to see [facility]. Patient declined the need for assistance at this time.  Kay Wadsworth

## 2023-10-25 ENCOUNTER — OFFICE VISIT (OUTPATIENT)
Dept: SPINE | Facility: CLINIC | Age: 33
End: 2023-10-25
Payer: MEDICARE

## 2023-10-25 VITALS
DIASTOLIC BLOOD PRESSURE: 107 MMHG | BODY MASS INDEX: 19.84 KG/M2 | OXYGEN SATURATION: 100 % | HEART RATE: 147 BPM | SYSTOLIC BLOOD PRESSURE: 144 MMHG | RESPIRATION RATE: 18 BRPM | WEIGHT: 112 LBS | TEMPERATURE: 99 F

## 2023-10-25 DIAGNOSIS — M43.6 NECK STIFFNESS: Primary | ICD-10-CM

## 2023-10-25 DIAGNOSIS — M54.9 UPPER BACK PAIN: ICD-10-CM

## 2023-10-25 DIAGNOSIS — M79.18 MYOFASCIAL PAIN: ICD-10-CM

## 2023-10-25 DIAGNOSIS — M47.816 SPONDYLOSIS OF LUMBAR REGION WITHOUT MYELOPATHY OR RADICULOPATHY: ICD-10-CM

## 2023-10-25 PROCEDURE — 99214 OFFICE O/P EST MOD 30 MIN: CPT | Mod: PBBFAC | Performed by: NURSE PRACTITIONER

## 2023-10-25 PROCEDURE — 99999 PR PBB SHADOW E&M-EST. PATIENT-LVL IV: ICD-10-PCS | Mod: PBBFAC,,, | Performed by: NURSE PRACTITIONER

## 2023-10-25 PROCEDURE — 99214 OFFICE O/P EST MOD 30 MIN: CPT | Mod: S$PBB,,, | Performed by: NURSE PRACTITIONER

## 2023-10-25 PROCEDURE — 99214 PR OFFICE/OUTPT VISIT, EST, LEVL IV, 30-39 MIN: ICD-10-PCS | Mod: S$PBB,,, | Performed by: NURSE PRACTITIONER

## 2023-10-25 PROCEDURE — 99999 PR PBB SHADOW E&M-EST. PATIENT-LVL IV: CPT | Mod: PBBFAC,,, | Performed by: NURSE PRACTITIONER

## 2023-10-25 NOTE — PROGRESS NOTES
Subjective:     Patient ID: Patsy Akins is a 33 y.o. female.    Chief Complaint: Back Pain and Headache    HPI Ms. Akins is a 33 year old female here for evaluation of back pain    Complaints of upper back pain between the shoulder blades for years  Notes neck stiffness and an electric, twitching sensation for the last several months  Pain is worse at bedtime and improves with hydration  Did PT in August 2023 which was helpful    Past Medical History:   Diagnosis Date    Bipolar affective     Bipolar disorder with depression     Anxiety disorder    Chronic back pain     Hypokalemia     Irritable bowel syndrome      History reviewed. No pertinent surgical history.    Family History   Problem Relation Age of Onset    Mental illness Mother     Alcohol abuse Mother     Depression Mother     Alcohol abuse Father     Depression Father     Alcohol abuse Maternal Aunt     Alcohol abuse Maternal Grandmother     Arthritis Maternal Grandmother     Stroke Maternal Grandmother     Asthma Brother     Depression Brother     Learning disabilities Brother     Mental illness Brother     Depression Paternal Grandmother     Diabetes Paternal Grandmother     Stroke Paternal Grandmother     Mental illness Paternal Aunt     Miscarriages / Stillbirths Paternal Aunt     Stroke Paternal Grandfather        Social History     Socioeconomic History    Marital status: Single   Occupational History    Occupation: Disabled    Tobacco Use    Smoking status: Never    Smokeless tobacco: Never    Tobacco comments:     Don't like it.   Substance and Sexual Activity    Alcohol use: Not Currently     Alcohol/week: 0.0 standard drinks of alcohol     Comment: Don't drink.    Drug use: No    Sexual activity: Never   Social History Narrative    ** Merged History Encounter **         Single and no children.   On disability due to mental illness.      Social Determinants of Health     Financial Resource Strain: Low Risk  (10/19/2023)    Overall Financial  Resource Strain (CARDIA)     Difficulty of Paying Living Expenses: Not hard at all   Food Insecurity: No Food Insecurity (10/19/2023)    Hunger Vital Sign     Worried About Running Out of Food in the Last Year: Never true     Ran Out of Food in the Last Year: Never true   Transportation Needs: Unmet Transportation Needs (10/19/2023)    PRAPARE - Transportation     Lack of Transportation (Medical): Yes     Lack of Transportation (Non-Medical): Yes   Physical Activity: Inactive (10/19/2023)    Exercise Vital Sign     Days of Exercise per Week: 0 days     Minutes of Exercise per Session: 0 min   Stress: No Stress Concern Present (10/19/2023)    [employer] [place] [employer]ational [employer] - Occupational Stress Questionnaire     Feeling of Stress : Only a little   Social Connections: Unknown (10/19/2023)    Social Connection and Isolation Panel [NHANES]     Frequency of Communication with Friends and Family: Three times a week     Frequency of Social Gatherings with Friends and Family: Three times a week     Attends [employer] Services: Patient refused     Active Member of Clubs or Organizations: Patient refused     Attends Club or Organization Meetings: Never     Marital Status: Never    Recent Concern: Social Connections - Socially Isolated (10/16/2023)    Social Connection and Isolation Panel [NHANES]     Frequency of Communication with Friends and Family: More than three times a week     Frequency of Social Gatherings with Friends and Family: More than three times a week     Attends [employer] Services: Never     Active Member of Clubs or Organizations: No     Attends Club or Organization Meetings: Never     Marital Status: Never    Housing Stability: Low Risk  (10/19/2023)    Housing Stability Vital Sign     Unable to Pay for Housing in the Last Year: No     Number of Places Lived in the Last Year: 1     Unstable Housing in the Last Year: No       Current Outpatient Medications   Medication Sig Dispense  Refill    bisacodyL (DULCOLAX) 10 mg Supp Place 1 suppository (10 mg total) rectally daily as needed (if no BM after 48 hours of miralax). 4 suppository 0    buPROPion (WELLBUTRIN SR) 150 MG TBSR 12 hr tablet TAKE 2 TABLETS BY MOUTH EVERY MORNING AND TAKE 1 TABLET EVERY EVENING  0    hydrOXYzine HCL (ATARAX) 25 MG tablet Take 1 tablet (25 mg total) by mouth 3 (three) times daily as needed for Anxiety. 20 tablet 0    melatonin (MELATIN) 5 mg Take by mouth.      omeprazole (PRILOSEC) 20 MG capsule Take 1 capsule (20 mg total) by mouth once daily. 90 capsule 3    ondansetron (ZOFRAN-ODT) 4 MG TbDL Take 1 tablet (4 mg total) by mouth every 8 (eight) hours as needed (nausea/vomiting). 15 tablet 0    OXcarbazepine (TRILEPTAL) 300 MG Tab Take 0.5 tablets (150 mg total) by mouth 2 (two) times daily. 180 tablet 2    topiramate (TOPAMAX) 200 MG Tab TAKE 1 TABLET BY MOUTH EVERY EVENING 30 tablet 10    vortioxetine (TRINTELLIX) 10 mg Tab Take 10 mg by mouth.      LORazepam (ATIVAN) 0.5 MG tablet Take 1 tablet (0.5 mg total) by mouth every 6 (six) hours as needed for Anxiety. 20 tablet 0    potassium chloride SA (K-DUR,KLOR-CON) 10 MEQ tablet Take 1 tablet (10 mEq total) by mouth once daily. (Patient not taking: No sig reported) 30 tablet 0     No current facility-administered medications for this visit.       Review of patient's allergies indicates:   Allergen Reactions    Lamotrigine Hives and Itching         Review of Systems   Constitutional: Negative for fever.   Cardiovascular:  Negative for chest pain.   Respiratory:  Negative for shortness of breath.    Musculoskeletal:  Positive for back pain.   Gastrointestinal:  Negative for bowel incontinence.   Genitourinary:  Negative for bladder incontinence.   Neurological:  Positive for paresthesias.        Objective:     General: Patsy is well-developed, well-nourished, appears stated age, in no acute distress, alert and oriented to time, place and person.      General    Vitals reviewed.  Constitutional: She is oriented to person, place, and time. She appears well-developed and well-nourished.   HENT:   Head: Atraumatic.   Nose: Nose normal.   Eyes: Conjunctivae are normal.   Cardiovascular:  Normal rate.            Pulmonary/Chest: Effort normal.   Neurological: She is alert and oriented to person, place, and time.   Psychiatric: She has a normal mood and affect. Her behavior is normal. Judgment and thought content normal.     General Musculoskeletal Exam   Gait: normal     Back (L-Spine & T-Spine) / Neck (C-Spine) Exam     Spinal Sensation   Right Side Sensation  C-Spine Level: normal   Left Side Sensation  C-Spine Level: normal    Other   She has no scoliosis .  Spinal Kyphosis:  Absent      Muscle Strength   Right Upper Extremity   Biceps: 5/5   Deltoid:  5/5  Triceps:  5/5  Finger Extensors:  5/5  Left Upper Extremity  Biceps: 5/5   Deltoid:  5/5  Triceps:  5/5  Finger Extensors:  5/5  Right Lower Extremity   Hip Flexion: 5/5   Quadriceps:  5/5   Anterior tibial:  5/5   EHL:  5/5  Left Lower Extremity   Hip Flexion: 5/5   Quadriceps:  5/5   Anterior tibial:  5/5   EHL:  5/5    Reflexes     Left Side  Biceps:  2+  Brachioradialis:  2+    Right Side   Biceps:  2+  Brachioradialis:  2+    Vascular Exam     Right Pulses        Carotid:                  2+    Left Pulses        Carotid:                  2+          Assessment:     1. Neck stiffness    2. Upper back pain    3. Spondylosis of lumbar region without myelopathy or radiculopathy    4. Myofascial pain         Plan:        Prior records reviewed today  We discussed back pain and the nature of back pain.  We discussed that it is not one thing that causes the pain but an accumulation of multiple things that we do.    Referral to physical therapy for evaluation and treatment of upper back and neck pain  We discussed posture sitting and the importance of trying to sit better.    We discussed the benefits of  therapy and exercise and continuing to move.   Return for follow-up if no improvement in symptoms    More than 50% of the total time  of 45 minutes was spent face to face in counseling on diagnosis and treatment options. I also counseled patient  on common and most usual side effect of prescribed medications.  I reviewed Primary care , and other specialty's notes to better coordinate patient's care. All questions were answered, and patient voiced understanding.      Follow-up: No follow-ups on file. If there are any questions prior to this, the patient was instructed to contact the office.

## 2023-11-09 ENCOUNTER — PATIENT OUTREACH (OUTPATIENT)
Dept: ADMINISTRATIVE | Facility: OTHER | Age: 33
End: 2023-11-09

## 2023-11-09 NOTE — PROGRESS NOTES
CHW - Outreach Attempt    Community Health Worker to attempt to contact patient on: 11/9/23 1st missed follow up visit attempt call.

## 2023-12-01 ENCOUNTER — E-VISIT (OUTPATIENT)
Dept: FAMILY MEDICINE | Facility: CLINIC | Age: 33
End: 2023-12-01
Payer: MEDICARE

## 2023-12-01 DIAGNOSIS — J30.9 ALLERGIC RHINITIS, UNSPECIFIED SEASONALITY, UNSPECIFIED TRIGGER: ICD-10-CM

## 2023-12-01 DIAGNOSIS — J32.9 SINUSITIS, UNSPECIFIED CHRONICITY, UNSPECIFIED LOCATION: Primary | ICD-10-CM

## 2023-12-01 PROCEDURE — 99422 OL DIG E/M SVC 11-20 MIN: CPT | Mod: ,,, | Performed by: STUDENT IN AN ORGANIZED HEALTH CARE EDUCATION/TRAINING PROGRAM

## 2023-12-01 PROCEDURE — 99422 PR E&M, ONLINE DIGIT, EST, < 7 DAYS,  11-20 MINS: ICD-10-PCS | Mod: ,,, | Performed by: STUDENT IN AN ORGANIZED HEALTH CARE EDUCATION/TRAINING PROGRAM

## 2023-12-01 RX ORDER — DESLORATADINE 5 MG/1
5 TABLET ORAL DAILY
Qty: 30 TABLET | Refills: 1 | Status: ON HOLD | OUTPATIENT
Start: 2023-12-01 | End: 2024-03-19 | Stop reason: HOSPADM

## 2023-12-01 RX ORDER — AMOXICILLIN AND CLAVULANATE POTASSIUM 875; 125 MG/1; MG/1
1 TABLET, FILM COATED ORAL 2 TIMES DAILY
Qty: 14 TABLET | Refills: 0 | Status: SHIPPED | OUTPATIENT
Start: 2023-12-01 | End: 2023-12-08

## 2023-12-01 RX ORDER — TRIAMCINOLONE ACETONIDE 55 UG/1
1 SPRAY, METERED NASAL DAILY
Qty: 16.9 ML | Refills: 1 | Status: SHIPPED | OUTPATIENT
Start: 2023-12-01 | End: 2024-01-30

## 2023-12-01 NOTE — PROGRESS NOTES
Patient ID: Patsy Akins is a 33 y.o. female.    Chief Complaint: Sinusitis          [id number]}  The patient initiated a request through [employer] on 12/1/2023 for evaluation and management with a chief complaint of Sinusitis     I evaluated the questionnaire submission on 12/01/2023 .    [facility]s Peq Evisit Upper Respitatory/Cough Questionnaire    12/1/2023  2:38 PM CST - Filed by Patient   Do you agree to participate in an E-Visit? Yes   If you have any of the following symptoms, please present to your local ER or call 911:  I acknowledge   What is the main issue that you would like for your doctor to address today? Prolonged allergies   Are you able to take your vital signs? No   Are you currently pregnant, could you be pregnant, or are you breast feeding? None of the above   What symptoms do you currently have?  Fatigue;  Nasal Congestion;  Runny nose;  Sore throat   Have you had any of the following? None of the above   Have you ever smoked? I have never smoked   Have you had a fever? No   When did your symptoms first appear? 10/1/2023   In the last two weeks, have you been in close contact with someone who has COVID-19 or the Flu? No   In the last two weeks, have you worked or volunteered in a healthcare facility or as a ? Healthcare facilities include a hospital, medical or dental clinic, long-term care facility, or nursing home No   Do you live in a long-term care facility, nursing home, group home, or homeless shelter? No   List what you have done or taken to help your symptoms. Otc allergy meds, OTC nasal decongestants, rest, cleaned for dust mites (allergic trigger for my ezyma)   How severe are your symptoms? Moderate   Have your symptoms improved since they first appeared? No change   Have you taken an at home Covid test? No   Have you taken a Flu test? No   Have you been fully vaccinated for COVID? (2 Pfizer, 2 Moderna or 1 Rikki & Rikki vaccine injections) Yes   Have you received a booster?  No   Have you recieved a Flu shot? No   Do you have transportation to get tested for COVID if it is indicated and ordered for you at an [facility] location? No   Provide any information you feel is important to your history not asked above Also allergic to dust mites (itching/rash, makes ezyma worse trigger), some days symptoms feel a little better, worse at night   Please attach any relevant images or files           Active Problem List with Overview Notes    Diagnosis Date Noted    Chronic bilateral thoracic back pain 2023    Arm weakness 2023    Chronic back pain     Gastroesophageal reflux disease without esophagitis 2019    Essential hypertension 2019    Bipolar 1 disorder, depressed 11/15/2019      Recent Labs Obtained:  Lab Results   Component Value Date    WBC 7.24 10/16/2023    HGB 10.6 (L) 10/16/2023    HCT 34.6 (L) 10/16/2023    MCV 78 (L) 10/16/2023     10/16/2023     (L) 10/16/2023    K 3.3 (L) 10/16/2023    GLU 86 10/16/2023    CREATININE 0.8 10/16/2023    EGFRNORACEVR >60 10/16/2023    HGBA1C 4.7 2021      Review of patient's allergies indicates:   Allergen Reactions    Lamotrigine Hives and Itching       Encounter Diagnoses   Name Primary?    Sinusitis, unspecified chronicity, unspecified location Yes    Allergic rhinitis, unspecified seasonality, unspecified trigger         No orders of the defined types were placed in this encounter.     Medications Ordered This Encounter   Medications    amoxicillin-clavulanate 875-125mg (AUGMENTIN) 875-125 mg per tablet     Sig: Take 1 tablet by mouth 2 (two) times daily. for 7 days     Dispense:  14 tablet     Refill:  0    desloratadine (CLARINEX) 5 mg tablet     Sig: Take 1 tablet (5 mg total) by mouth once daily.     Dispense:  30 tablet     Refill:  1    triamcinolone (NASACORT) 55 mcg nasal inhaler     Si spray by Nasal route once daily.     Dispense:  16.9 mL     Refill:  1        E-Visit Time Tracking:    Day 1  Time (in minutes): 16     Total Time (in minutes): 16       113}

## 2023-12-06 ENCOUNTER — PATIENT MESSAGE (OUTPATIENT)
Dept: INTERNAL MEDICINE | Facility: CLINIC | Age: 33
End: 2023-12-06
Payer: MEDICARE

## 2023-12-07 ENCOUNTER — E-VISIT (OUTPATIENT)
Dept: INTERNAL MEDICINE | Facility: CLINIC | Age: 33
End: 2023-12-07
Payer: MEDICARE

## 2023-12-07 DIAGNOSIS — J31.0 CHRONIC RHINITIS: Primary | ICD-10-CM

## 2023-12-07 PROCEDURE — 99499 NO LOS: ICD-10-PCS | Mod: ,,, | Performed by: INTERNAL MEDICINE

## 2023-12-07 PROCEDURE — 99499 UNLISTED E&M SERVICE: CPT | Mod: ,,, | Performed by: INTERNAL MEDICINE

## 2023-12-07 NOTE — PROGRESS NOTES
Patient ID: Patsy Akins is a 33 y.o. female.    Chief Complaint: URI (Entered automatically based on patient selection in Patient Portal.)    The patient initiated a request through [employer] on 12/7/2023 for evaluation and management with a chief complaint of URI (Entered automatically based on patient selection in Patient Portal.)     I evaluated the questionnaire submission on 12/7/23.    Ohs Peq Evisit Upper Respitatory/Cough Questionnaire    12/7/2023  9:54 AM CST - Filed by Patient   Do you agree to participate in an E-Visit? Yes   If you have any of the following symptoms, please present to your local ER or call 911:  I acknowledge   What is the main issue that you would like for your doctor to address today? Adverse effect of a medication   Are you able to take your vital signs? No   Are you currently pregnant, could you be pregnant, or are you breast feeding? None of the above   What symptoms do you currently have?  Fatigue;  Runny nose;  Sore throat   Have you had any of the following? None of the above   Have you ever smoked? I have never smoked   Have you had a fever? No   When did your symptoms first appear? 10/15/2023   In the last two weeks, have you been in close contact with someone who has COVID-19 or the Flu? No   In the last two weeks, have you worked or volunteered in a healthcare facility or as a ? Healthcare facilities include a hospital, medical or dental clinic, long-term care facility, or nursing home No   Do you live in a long-term care facility, nursing home, group home, or homeless shelter? No   List what you have done or taken to help your symptoms. Otc medication, prescription amoxicillin, prescription clarinex   How severe are your symptoms? Mild   Have your symptoms improved since they first appeared? Better   Have you taken an at home Covid test? No   Have you taken a Flu test? No   Have you been fully vaccinated for COVID? (2 Pfizer, 2 Moderna or 1 Rebelle  Rikki vaccine injections) Yes   Have you received a booster? Yes   Have you recieved a Flu shot? No   Do you have transportation to get tested for COVID if it is indicated and ordered for you at an [facility] location? Yes   Provide any information you feel is important to your history not asked above Had adverse reaction to the clarinex. That is why I am asking for follow up. It made me itchy.   Please attach any relevant images or files           Active Problem List with Overview Notes    Diagnosis Date Noted    Chronic bilateral thoracic back pain 08/01/2023    Arm weakness 08/01/2023    Chronic back pain     Gastroesophageal reflux disease without esophagitis 11/16/2019    Essential hypertension 11/16/2019    Bipolar 1 disorder, depressed 11/15/2019      Recent Labs Obtained:  No visits with results within 7 Day(s) from this visit.   Latest known visit with results is:   Admission on 10/16/2023, Discharged on 10/16/2023   Component Date Value Ref Range Status    WBC 10/16/2023 7.24  3.90 - 12.70 K/uL Final    RBC 10/16/2023 4.42  4.00 - 5.40 M/uL Final    Hemoglobin 10/16/2023 10.6 (L)  12.0 - 16.0 g/dL Final    Hematocrit 10/16/2023 34.6 (L)  37.0 - 48.5 % Final    MCV 10/16/2023 78 (L)  82 - 98 fL Final    MCH 10/16/2023 24.0 (L)  27.0 - 31.0 pg Final    MCHC 10/16/2023 30.6 (L)  32.0 - 36.0 g/dL Final    RDW 10/16/2023 17.5 (H)  11.5 - 14.5 % Final    Platelets 10/16/2023 252  150 - 450 K/uL Final    MPV 10/16/2023 8.4 (L)  9.2 - 12.9 fL Final    Immature Granulocytes 10/16/2023 0.1  0.0 - 0.5 % Final    Gran # (ANC) 10/16/2023 5.3  1.8 - 7.7 K/uL Final    Immature Grans (Abs) 10/16/2023 0.01  0.00 - 0.04 K/uL Final    Comment: Mild elevation in immature granulocytes is non specific and   can be seen in a variety of conditions including stress response,   acute inflammation, trauma and pregnancy. Correlation with other   laboratory and clinical findings is essential.      Lymph # 10/16/2023 1.2  1.0 - 4.8 K/uL  Final    Mono # 10/16/2023 0.5  0.3 - 1.0 K/uL Final    Eos # 10/16/2023 0.2  0.0 - 0.5 K/uL Final    Baso # 10/16/2023 0.03  0.00 - 0.20 K/uL Final    nRBC 10/16/2023 0  0 /100 WBC Final    Gran % 10/16/2023 73.8 (H)  38.0 - 73.0 % Final    Lymph % 10/16/2023 16.0 (L)  18.0 - 48.0 % Final    Mono % 10/16/2023 6.5  4.0 - 15.0 % Final    Eosinophil % 10/16/2023 3.2  0.0 - 8.0 % Final    Basophil % 10/16/2023 0.4  0.0 - 1.9 % Final    Differential Method 10/16/2023 Automated   Final    Sodium 10/16/2023 134 (L)  136 - 145 mmol/L Final    Potassium 10/16/2023 3.3 (L)  3.5 - 5.1 mmol/L Final    Chloride 10/16/2023 106  95 - 110 mmol/L Final    CO2 10/16/2023 19 (L)  23 - 29 mmol/L Final    Glucose 10/16/2023 86  70 - 110 mg/dL Final    BUN 10/16/2023 13  6 - 20 mg/dL Final    Creatinine 10/16/2023 0.8  0.5 - 1.4 mg/dL Final    Calcium 10/16/2023 9.4  8.7 - 10.5 mg/dL Final    Total Protein 10/16/2023 7.1  6.0 - 8.4 g/dL Final    Albumin 10/16/2023 3.9  3.5 - 5.2 g/dL Final    Total Bilirubin 10/16/2023 0.4  0.1 - 1.0 mg/dL Final    Comment: For infants and newborns, interpretation of results should be based  on gestational age, weight and in agreement with clinical  observations.    Premature Infant recommended reference ranges:  Up to 24 hours.............<8.0 mg/dL  Up to 48 hours............<12.0 mg/dL  3-5 days..................<15.0 mg/dL  6-29 days.................<15.0 mg/dL      Alkaline Phosphatase 10/16/2023 55  55 - 135 U/L Final    AST 10/16/2023 17  10 - 40 U/L Final    ALT 10/16/2023 13  10 - 44 U/L Final    eGFR 10/16/2023 >60  >60 mL/min/1.73 m^2 Final    Anion Gap 10/16/2023 9  8 - 16 mmol/L Final    Specimen UA 10/16/2023 Urine, Clean Catch   Final    Color, UA 10/16/2023 Yellow  Yellow, Straw, Grace Final    Appearance, UA 10/16/2023 Clear  Clear Final    pH, UA 10/16/2023 7.0  5.0 - 8.0 Final    Specific Gravity, UA 10/16/2023 1.010  1.005 - 1.030 Final    Protein, UA 10/16/2023 Negative  Negative  Final    Comment: Recommend a 24 hour urine protein or a urine   protein/creatinine ratio if globulin induced proteinuria is  clinically suspected.      Glucose, UA 10/16/2023 Negative  Negative Final    Ketones, UA 10/16/2023 Negative  Negative Final    Bilirubin (UA) 10/16/2023 Negative  Negative Final    Occult Blood UA 10/16/2023 Negative  Negative Final    Nitrite, UA 10/16/2023 Negative  Negative Final    Urobilinogen, UA 10/16/2023 Negative  <2.0 EU/dL Final    Leukocytes, UA 10/16/2023 Negative  Negative Final    POC Preg Test, Ur 10/16/2023 Negative  Negative Final     Acceptable 10/16/2023 Yes   Final       No diagnosis found.     No orders of the defined types were placed in this encounter.           E-Visit Time Tracking:

## 2023-12-11 ENCOUNTER — E-VISIT (OUTPATIENT)
Dept: INTERNAL MEDICINE | Facility: CLINIC | Age: 33
End: 2023-12-11
Payer: MEDICARE

## 2023-12-11 DIAGNOSIS — J31.0 CHRONIC RHINITIS: Primary | ICD-10-CM

## 2023-12-11 PROCEDURE — 99499 UNLISTED E&M SERVICE: CPT | Mod: ,,, | Performed by: INTERNAL MEDICINE

## 2023-12-11 PROCEDURE — 99499 NO LOS: ICD-10-PCS | Mod: ,,, | Performed by: INTERNAL MEDICINE

## 2023-12-11 NOTE — PROGRESS NOTES
Patient ID: Patsy Akins is a 33 y.o. female.    Chief Complaint: URI (Entered automatically based on patient selection in Patient Portal.)    The patient initiated a request through [employer] on 12/11/2023 for evaluation and management with a chief complaint of URI (Entered automatically based on patient selection in Patient Portal.)     I evaluated the questionnaire submission on 12/11/23.    Ohs Peq Evisit Upper Respitatory/Cough Questionnaire    12/11/2023  2:57 PM CST - Filed by Patient   Do you agree to participate in an E-Visit? Yes   If you have any of the following symptoms, please present to your local ER or call 911:  I acknowledge   What is the main issue that you would like for your doctor to address today? Medicine questions/ prolonged allergies   Are you able to take your vital signs? No   Are you currently pregnant, could you be pregnant, or are you breast feeding? None of the above   What symptoms do you currently have?  Fatigue;  Muscle or body aches;  Sore throat   Have you had any of the following? None of the above   Have you ever smoked? I have never smoked   Have you had a fever? No   When did your symptoms first appear? 10/15/2023   In the last two weeks, have you been in close contact with someone who has COVID-19 or the Flu? No   In the last two weeks, have you worked or volunteered in a healthcare facility or as a ? Healthcare facilities include a hospital, medical or dental clinic, long-term care facility, or nursing home No   Do you live in a long-term care facility, nursing home, group home, or homeless shelter? No   List what you have done or taken to help your symptoms. Otc allergy meds, rxn amoxicillin, other rxns (in chart) discontinued   How severe are your symptoms? Mild   Have your symptoms improved since they first appeared? Better   Have you taken an at home Covid test? No   Have you taken a Flu test? No   Have you been fully vaccinated for COVID? (2 Pfizer, 2  Moderna or 1 Rikki & Rikki vaccine injections) Yes   Have you received a booster? Yes   Have you recieved a Flu shot? No   Do you have transportation to get tested for COVID if it is indicated and ordered for you at an [facility] location? Yes   Provide any information you feel is important to your history not asked above Questions about treatment being prescribed   Please attach any relevant images or files           Active Problem List with Overview Notes    Diagnosis Date Noted    Chronic bilateral thoracic back pain 08/01/2023    Arm weakness 08/01/2023    Chronic back pain     Gastroesophageal reflux disease without esophagitis 11/16/2019    Essential hypertension 11/16/2019    Bipolar 1 disorder, depressed 11/15/2019      Recent Labs Obtained:  No visits with results within 7 Day(s) from this visit.   Latest known visit with results is:   Admission on 10/16/2023, Discharged on 10/16/2023   Component Date Value Ref Range Status    WBC 10/16/2023 7.24  3.90 - 12.70 K/uL Final    RBC 10/16/2023 4.42  4.00 - 5.40 M/uL Final    Hemoglobin 10/16/2023 10.6 (L)  12.0 - 16.0 g/dL Final    Hematocrit 10/16/2023 34.6 (L)  37.0 - 48.5 % Final    MCV 10/16/2023 78 (L)  82 - 98 fL Final    MCH 10/16/2023 24.0 (L)  27.0 - 31.0 pg Final    MCHC 10/16/2023 30.6 (L)  32.0 - 36.0 g/dL Final    RDW 10/16/2023 17.5 (H)  11.5 - 14.5 % Final    Platelets 10/16/2023 252  150 - 450 K/uL Final    MPV 10/16/2023 8.4 (L)  9.2 - 12.9 fL Final    Immature Granulocytes 10/16/2023 0.1  0.0 - 0.5 % Final    Gran # (ANC) 10/16/2023 5.3  1.8 - 7.7 K/uL Final    Immature Grans (Abs) 10/16/2023 0.01  0.00 - 0.04 K/uL Final    Comment: Mild elevation in immature granulocytes is non specific and   can be seen in a variety of conditions including stress response,   acute inflammation, trauma and pregnancy. Correlation with other   laboratory and clinical findings is essential.      Lymph # 10/16/2023 1.2  1.0 - 4.8 K/uL Final    Mono # 10/16/2023  0.5  0.3 - 1.0 K/uL Final    Eos # 10/16/2023 0.2  0.0 - 0.5 K/uL Final    Baso # 10/16/2023 0.03  0.00 - 0.20 K/uL Final    nRBC 10/16/2023 0  0 /100 WBC Final    Gran % 10/16/2023 73.8 (H)  38.0 - 73.0 % Final    Lymph % 10/16/2023 16.0 (L)  18.0 - 48.0 % Final    Mono % 10/16/2023 6.5  4.0 - 15.0 % Final    Eosinophil % 10/16/2023 3.2  0.0 - 8.0 % Final    Basophil % 10/16/2023 0.4  0.0 - 1.9 % Final    Differential Method 10/16/2023 Automated   Final    Sodium 10/16/2023 134 (L)  136 - 145 mmol/L Final    Potassium 10/16/2023 3.3 (L)  3.5 - 5.1 mmol/L Final    Chloride 10/16/2023 106  95 - 110 mmol/L Final    CO2 10/16/2023 19 (L)  23 - 29 mmol/L Final    Glucose 10/16/2023 86  70 - 110 mg/dL Final    BUN 10/16/2023 13  6 - 20 mg/dL Final    Creatinine 10/16/2023 0.8  0.5 - 1.4 mg/dL Final    Calcium 10/16/2023 9.4  8.7 - 10.5 mg/dL Final    Total Protein 10/16/2023 7.1  6.0 - 8.4 g/dL Final    Albumin 10/16/2023 3.9  3.5 - 5.2 g/dL Final    Total Bilirubin 10/16/2023 0.4  0.1 - 1.0 mg/dL Final    Comment: For infants and newborns, interpretation of results should be based  on gestational age, weight and in agreement with clinical  observations.    Premature Infant recommended reference ranges:  Up to 24 hours.............<8.0 mg/dL  Up to 48 hours............<12.0 mg/dL  3-5 days..................<15.0 mg/dL  6-29 days.................<15.0 mg/dL      Alkaline Phosphatase 10/16/2023 55  55 - 135 U/L Final    AST 10/16/2023 17  10 - 40 U/L Final    ALT 10/16/2023 13  10 - 44 U/L Final    eGFR 10/16/2023 >60  >60 mL/min/1.73 m^2 Final    Anion Gap 10/16/2023 9  8 - 16 mmol/L Final    Specimen UA 10/16/2023 Urine, Clean Catch   Final    Color, UA 10/16/2023 Yellow  Yellow, Straw, Grace Final    Appearance, UA 10/16/2023 Clear  Clear Final    pH, UA 10/16/2023 7.0  5.0 - 8.0 Final    Specific Gravity, UA 10/16/2023 1.010  1.005 - 1.030 Final    Protein, UA 10/16/2023 Negative  Negative Final    Comment: Recommend a  24 hour urine protein or a urine   protein/creatinine ratio if globulin induced proteinuria is  clinically suspected.      Glucose, UA 10/16/2023 Negative  Negative Final    Ketones, UA 10/16/2023 Negative  Negative Final    Bilirubin (UA) 10/16/2023 Negative  Negative Final    Occult Blood UA 10/16/2023 Negative  Negative Final    Nitrite, UA 10/16/2023 Negative  Negative Final    Urobilinogen, UA 10/16/2023 Negative  <2.0 EU/dL Final    Leukocytes, UA 10/16/2023 Negative  Negative Final    POC Preg Test, Ur 10/16/2023 Negative  Negative Final     Acceptable 10/16/2023 Yes   Final       Encounter Diagnosis   Name Primary?    Chronic rhinitis Yes        No orders of the defined types were placed in this encounter.       Reviewed info for today's E-Visit and those responses for the E-Visit from last week.     E-Visit Time Tracking:    Day 1 Time (in minutes): 5     Total Time (in minutes): 5

## 2023-12-20 ENCOUNTER — CLINICAL SUPPORT (OUTPATIENT)
Dept: REHABILITATION | Facility: HOSPITAL | Age: 33
End: 2023-12-20
Payer: MEDICARE

## 2023-12-20 DIAGNOSIS — M43.6 NECK STIFFNESS: ICD-10-CM

## 2023-12-20 DIAGNOSIS — M47.816 SPONDYLOSIS OF LUMBAR REGION WITHOUT MYELOPATHY OR RADICULOPATHY: ICD-10-CM

## 2023-12-20 DIAGNOSIS — R29.898 DECREASED STRENGTH OF TRUNK AND BACK: ICD-10-CM

## 2023-12-20 DIAGNOSIS — M54.9 UPPER BACK PAIN: ICD-10-CM

## 2023-12-20 DIAGNOSIS — R29.3 POSTURE IMBALANCE: Primary | ICD-10-CM

## 2023-12-20 DIAGNOSIS — R29.898 DECREASED ROM OF NECK: ICD-10-CM

## 2023-12-20 PROCEDURE — 97161 PT EVAL LOW COMPLEX 20 MIN: CPT | Mod: PN

## 2023-12-20 PROCEDURE — 97110 THERAPEUTIC EXERCISES: CPT | Mod: PN

## 2023-12-20 NOTE — PLAN OF CARE
"[facility]   Physical Therapy Initial Evaluation      Name: Patsy Akins  [facility] Number: [id number]    Therapy Diagnosis:   Encounter Diagnoses   Name Primary?    Upper back pain     Spondylosis of lumbar region without myelopathy or radiculopathy     Neck stiffness         Physician: Sonal Roman, NP    Physician Orders: PT Eval and Treat   Medical Diagnosis from Referral:   M54.9 (ICD-10-CM) - Upper back pain   M47.816 (ICD-10-CM) - Spondylosis of lumbar region without myelopathy or radiculopathy   M43.6 (ICD-10-CM) - Neck stiffness     Evaluation Date: 12/20/2023  Authorization Period Expiration: 10/24/2024  Plan of Care Expiration: 3/20/24  Progress Note Due: 1/20/24  Date of Surgery: NA  Visit # / Visits authorized: 1/ 1   FOTO: 1/ 3    Precautions: Standard and Hx Bipolas     Time In: 1105a  Time Out: 1200  Total Billable Time: 55 minutes    Subjective     Date of onset: "years"    History of current condition - PATSY reports: She was evaled back in August for the same thing but she never went back after the assessment. She had difficulty getting a ride and got sick with her allergies. She is still feeling really stiff. She has not been really moving around the last few years and she feels like her upper body area is stiff. She gets tired really easily and that did not use to be a problem for her. It has happened within the past 5 years where it has become an issue and has gotten worse in the past 2-3 years. She has seen a doctor for her fatigue and they did not find anything that is wrong other than just muscle fatigue and endurance. She denies pain down her arms. If she tries to lift anything that has some weights to it then she will get a soreness or feel like she is straining too much. She has been getting twitching in her back when she is trying to go to sleep at night. She saw a neurologist and she thinks that it is all muscular. She reports that in the past with she " "was in PT that it did seem to help everything. She has tried to keep up with her exercises but then she falls off.     Falls: none    Imaging: none:     Prior Therapy: PT August 2023  Social History:  lives alone  Occupation: unemployed  Prior Level of Function: independent with pain  Current Level of Function: difficulty walking long distances, difficulty lifting, sitting upright for prolonged periods of time    Pain:  Current 2/10, worst 8/10, best 0/10   Location: thoracic and neck   Description: Burning and tight, tense  Aggravating Factors: sitting upright for prolonged periods of time, lifting, walking for prolonged periods of time  Easing Factors: rest and Ibuprofen     Patients goals: "I want to be able to start to exercise again."     Medical History:   Past Medical History:   Diagnosis Date    Bipolar affective     Bipolar disorder with depression     Anxiety disorder    Chronic back pain     Hypokalemia     Irritable bowel syndrome        Surgical History:   Patsy Akins  has no past surgical history on file.    Medications:   Patsy has a current medication list which includes the following prescription(s): bupropion, desloratadine, hydroxyzine hcl, melatonin, omeprazole, ondansetron, oxcarbazepine, topiramate, triamcinolone, and trintellix.    Allergies:   Review of patient's allergies indicates:   Allergen Reactions    Lamotrigine Hives and Itching        Objective      Posture Alignment: slouched posture, forward head.     CERVICAL SPINE AROM:   Flexion: 50 deg   Extension: 30 deg   Left Sidebend: 32 deg   Right Sidebend: 40 deg   Left Rotation: ~ 30 degrees   Right Rotation: To mid clavicle     SEGMENTAL MOBILITY: WFL with pain with external rotation     UPPER EXTREMITY STRENGTH:   Left Right   Shoulder Flexion 4/5 4/5   Shoulder Abduction 4+/5 4+/5     Shoulder Internal Rotation 4+/5 4/5   Shoulder External Rotation 4+/5 4+/5   Middle trap 4-/5 4-/5   Lower trap 4-/5 4-/5       DTR's: NT " "    Dermatomes: Sensation: Light Touch: Intact  Myotomes: NT  Palpation: global tenderness to Left paraspinals        Pt/family was provided educational information, including: role of PT, goals for PT, scheduling - pt verbalized understanding. Discussed insurance limitations with pt.    Intake Outcome Measure for FOTO neck Survey    Therapist reviewed FOTO scores for Patsy Akins on 12/20/2023.   FOTO report - see Media section or FOTO account episode details.    Intake Score: 44%         Treatment     Total Treatment time (time-based codes) separate from Evaluation: 20 minutes     PATSY received the treatments listed below:      therapeutic exercises to develop strength, endurance, ROM, flexibility, and posture for 15 minutes including:  Side lying open book 5"x10ea  SNAGs 10"x10 ea  Patient education     neuromuscular re-education activities to improve: Coordination and Posture for 5 minutes. The following activities were included:  Middle trap  5"x10  Lower trap luz elena 5"x10      Patient Education and Home Exercises     Education provided:   - Home exercise program   - Plan of Care     Written Home Exercises Provided: yes. Exercises were reviewed and PATSY was able to demonstrate them prior to the end of the session.  PATSY demonstrated good  understanding of the education provided. See EMR under Patient Instructions for exercises provided during therapy sessions.    Assessment     Patsy is a 33 y.o. female referred to outpatient Physical Therapy with a medical diagnosis of Upper back pain ,Spondylosis of lumbar region without myelopathy or radiculopathy, Neck stiffness . Patient presents with forward head and rounded posture with increase in thoracic kyphosis. She displayed decrease in cervical and thoracic range of motion as well as decrease in postural strength. These deficits are impacting the patient's sleep as well as ability to perform ADLs.     Patient prognosis is Good.   Patient will " benefit from skilled outpatient Physical Therapy to address the deficits stated above and in the chart below, provide patient /family education, and to maximize patientt's level of independence.     Plan of care discussed with patient: Yes  Patient's spiritual, cultural and educational needs considered and patient is agreeable to the plan of care and goals as stated below:     Anticipated Barriers for therapy: chronicity of condition, transportation     Medical Necessity is demonstrated by the following  History  Co-morbidities and personal factors that may impact the plan of care [] LOW: no personal factors / co-morbidities  [] MODERATE: 1-2 personal factors / co-morbidities  [x] HIGH: 3+ personal factors / co-morbidities    Moderate / High Support Documentation:   Co-morbidities affecting plan of care:   Bipolar affective    Bipolar disorder with depression    Anxiety disorder   Chronic back pain    Hypokalemia    Irritable bowel syndrome        Personal Factors:   age  coping style  social background  lifestyle  character  attitudes     Examination  Body Structures and Functions, activity limitations and participation restrictions that may impact the plan of care [x] LOW: addressing 1-2 elements  [] MODERATE: 3+ elements  [] HIGH: 4+ elements (please support below)    Moderate / High Support Documentation:      Clinical Presentation [x] LOW: stable  [] MODERATE: Evolving  [] HIGH: Unstable     Decision Making/ Complexity Score: low       Goals:  Short Term Goals: 5 weeks   Patient will be independent with Home exercise program to supplement therapy  Patient will improve bilateral middle trap strength to 4/5 to improve tolerance to functional tasks   Patient will be able to sleep throughout the night without muscle spasms to improve quality of life   Patient will be able to sit for 30 minutes without back support to improve sitting tolerance    Long Term Goals: 10 weeks   Patient FOTO score will improve to 53% to  improve tolerance to functional tasks  Patient will be able to read for prolonged periods of time without increase in neck or back pain  Patient will be able to perform household tasks without increase in middle back pain to improve tolerance to perform functional tasks.   Plan     Plan of care Certification: 12/20/2023 to 3/20/24.    Outpatient Physical Therapy 1-2 times weekly for 10 weeks to include the following interventions: Manual Therapy, Moist Heat/ Ice, Neuromuscular Re-ed, Patient Education, Therapeutic Activities, Therapeutic Exercise, and dry needling PRN .     Pilar Persaud, PT,DPT  12/20/2023          Physician's Signature: _________________________________________ Date: ________________

## 2024-01-02 ENCOUNTER — PATIENT MESSAGE (OUTPATIENT)
Dept: INTERNAL MEDICINE | Facility: CLINIC | Age: 34
End: 2024-01-02
Payer: MEDICARE

## 2024-01-02 DIAGNOSIS — F31.9 BIPOLAR 1 DISORDER, DEPRESSED: Primary | ICD-10-CM

## 2024-01-03 RX ORDER — OXCARBAZEPINE 300 MG/1
150 TABLET, FILM COATED ORAL 2 TIMES DAILY
Qty: 180 TABLET | Refills: 2 | Status: SHIPPED | OUTPATIENT
Start: 2024-01-03

## 2024-01-03 NOTE — TELEPHONE ENCOUNTER
No care due was identified.  [facility] Embedded Care Due Messages. Reference number: [id number].   1/03/2024 7:28:06 AM CST

## 2024-01-12 ENCOUNTER — CLINICAL SUPPORT (OUTPATIENT)
Dept: REHABILITATION | Facility: HOSPITAL | Age: 34
End: 2024-01-12
Payer: MEDICARE

## 2024-01-12 DIAGNOSIS — R29.898 DECREASED STRENGTH OF TRUNK AND BACK: ICD-10-CM

## 2024-01-12 DIAGNOSIS — R29.898 DECREASED ROM OF NECK: ICD-10-CM

## 2024-01-12 DIAGNOSIS — R29.3 POSTURE IMBALANCE: Primary | ICD-10-CM

## 2024-01-12 PROCEDURE — 97110 THERAPEUTIC EXERCISES: CPT | Mod: PN

## 2024-01-12 PROCEDURE — 97140 MANUAL THERAPY 1/> REGIONS: CPT | Mod: PN

## 2024-01-12 NOTE — PROGRESS NOTES
"[facility]   Physical Therapy Treatment Note      Name: Patsy Akins  [facility] Number: [id number]    Therapy Diagnosis:   Encounter Diagnoses   Name Primary?    Posture imbalance Yes    Decreased strength of trunk and back     Decreased ROM of neck      Physician: Sonal Roman NP    Visit Date: 1/12/2024    Physician Orders: PT Eval and Treat   Medical Diagnosis from Referral:   M54.9 (ICD-10-CM) - Upper back pain   M47.816 (ICD-10-CM) - Spondylosis of lumbar region without myelopathy or radiculopathy   M43.6 (ICD-10-CM) - Neck stiffness      Evaluation Date: 12/20/2023  Authorization Period Expiration: 10/24/2024  Plan of Care Expiration: 3/20/24  Progress Note Due: 1/20/24  Date of Surgery: NA  Visit # / Visits authorized: 1/ 20 (+eval)  FOTO: 1/ 3     Precautions: Standard and Hx Bipolar      Time In: 1100a  Time Out: 1153a  Total Billable Time: 30 minutes    PTA Visit #: 0/5       Subjective     Patient reports: She is feeling a little sore. She has been doing her exercises at home and the stretches feel good but the strengthening makes her sore.She has been feeling out of breath and she thinks that it is from her low iron.   She was compliant with home exercise program.  Response to previous treatment: 1st treatment  Functional change: ongoing    Pain: 2/10  Location: thoracic and neck     Objective      Objective Measures updated at progress report unless specified.     Treatment     Ana received the treatments listed below:      therapeutic exercises to develop strength, endurance, ROM, flexibility, and posture for 30 minutes including:  +UBE 4/4  +seated thoracic extension over towel roll 10"x20  Side lying open book 5"x20 ea  SNAGs 10"x20 ea  Standing rows green theraband 2x10  Standing shoulder extension red theraband 2x10  Patient education     manual therapy techniques: Joint mobilizations were applied to the: thoracic spine for 8 minutes, including:   to " "thoracic spine grade ii-iii    neuromuscular re-education activities to improve: Coordination and Posture for 15 minutes. The following activities were included:  Middle trap  5" 2x10  Lower trap luz elena 5" 2x10  Seated bilateral shoulder external rotation with yellow theraband 5" 2x10  Supine neck flexor endurance with BP cuff 10"x10        Patient Education and Home Exercises       Education provided:   - Continue Home exercise program     Written Home Exercises Provided: Patient instructed to cont prior HEP. Exercises were reviewed and Ana was able to demonstrate them prior to the end of the session.  Ana demonstrated good  understanding of the education provided. See Electronic Medical Record under Patient Instructions for exercises provided during therapy sessions    Assessment     Ms. Akins was seen for her first follow up visit since initial evaluation. PT continued with mobility and postural strengthening exercises. Patient displayed improvement in cervical rotation in bilateral directions with self reported ease when rotating to the Right. Patient required moderate cueing for proper body mechanics with standing rows and shoulder extension.     Ana Is progressing well towards her goals.   Patient prognosis is Good.     Patient will continue to benefit from skilled outpatient physical therapy to address the deficits listed in the problem list box on initial evaluation, provide pt/family education and to maximize pt's level of independence in the home and community environment.     Patient's spiritual, cultural and educational needs considered and pt agreeable to plan of care and goals.     Anticipated barriers to physical therapy: chronicity of condition, transportation        Goals: Short Term Goals: 5 weeks   Patient will be independent with Home exercise program to supplement therapy progressing,not met  Patient will improve bilateral middle trap strength to 4/5 to improve tolerance to " functional tasks progressing,not met  Patient will be able to sleep throughout the night without muscle spasms to improve quality of life progressing,not met  Patient will be able to sit for 30 minutes without back support to improve sitting tolerance progressing,not met     Long Term Goals: 10 weeks   Patient FOTO score will improve to 53% to improve tolerance to functional tasks progressing,not met  Patient will be able to read for prolonged periods of time without increase in neck or back pain progressing,not met  Patient will be able to perform household tasks without increase in middle back pain to improve tolerance to perform functional tasks. progressing,not met    Plan     Improve mobility and postural strength    Pilar Persaud, PT,DPT  01/12/2024

## 2024-01-29 NOTE — PROGRESS NOTES
[facility]   Physical Therapy Treatment Note      Name: Patsy Akins  [facility] Number: [id number]    Therapy Diagnosis:   Encounter Diagnoses   Name Primary?    Posture imbalance Yes    Decreased strength of trunk and back     Decreased ROM of neck        Physician: Sonal Roman NP    Visit Date: 1/30/2024    Physician Orders: PT Eval and Treat   Medical Diagnosis from Referral:   M54.9 (ICD-10-CM) - Upper back pain   M47.816 (ICD-10-CM) - Spondylosis of lumbar region without myelopathy or radiculopathy   M43.6 (ICD-10-CM) - Neck stiffness      Evaluation Date: 12/20/2023  Authorization Period Expiration: 10/24/2024  Plan of Care Expiration: 3/20/24  Progress Note Due: 3/1/24  Date of Surgery: NA  Visit # / Visits authorized: 2/ 20 (+eval)  FOTO: 1/ 3     Precautions: Standard and Hx Bipolar      Time In: 1100a  Time Out: 1152a  Total Billable Time: 30 minutes    PTA Visit #: 0/5       Subjective     Patient reports:She has been feeling really tired. Some days are better than better. She has changes with the weather. When the weather changes abruptly it is a lot stiffer. She has stiffness when she first wakes up and then if she does the exercises it will feel better.   She was compliant with home exercise program.  Response to previous treatment: good, with relief for the rest of the day and the next day  Functional change: ongoing    Pain: 2/10  Location: thoracic and neck     Objective      Objective Measures updated at progress report unless specified.     1/30/24    CERVICAL SPINE AROM:   Flexion: 55 deg   Extension: 70 deg   Left Sidebend: 40 deg   Right Sidebend: 40 deg   Left Rotation: 40 deg   Right Rotation: 45 deg         UPPER EXTREMITY STRENGTH:    Left Right   Shoulder Flexion 4+/5 4+/5   Shoulder Abduction 5/5 5/5      Shoulder Internal Rotation 4+/5 4/5   Shoulder External Rotation 4+/5 4+/5   Middle trap 4/5 4/5   Lower trap 4/5 4/5      Treatment     Ana  "received the treatments listed below:      therapeutic exercises to develop strength, endurance, ROM, flexibility, and posture for 32 minutes including:  UBE 4/4  seated thoracic extension over towel roll 10"x20  Side lying open book 5"x20 ea  SNAGs 10"x20 ea  Standing rows green theraband 2x10  Standing shoulder extension red theraband 2x10  reassessment  Patient education     manual therapy techniques: Joint mobilizations were applied to the: thoracic spine for 00 minutes, including:   to thoracic spine grade ii-iii    neuromuscular re-education activities to improve: Coordination and Posture for 20 minutes. The following activities were included:  Middle trap  5" 3x10  Lower trap luz elena 5" 3x10  Seated bilateral shoulder external rotation with yellow theraband 5" 2x10  Supine neck flexor endurance with BP cuff 10"x10        Patient Education and Home Exercises       Education provided:   - Continue Home exercise program     Written Home Exercises Provided: Patient instructed to cont prior HEP. Exercises were reviewed and nAa was able to demonstrate them prior to the end of the session.  Ana demonstrated good  understanding of the education provided. See Electronic Medical Record under Patient Instructions for exercises provided during therapy sessions    Assessment     Ms. Akins was reassessed today and displayed improvements in range of motion and strength. She continues to have some limitations with rotation as well as with postural strength. Patient required moderate cueing for proper scapular retractions with exercises. Patient would continue to benefit from skilled PT to address deficits.     Ana Is progressing well towards her goals.   Patient prognosis is Good.     Patient will continue to benefit from skilled outpatient physical therapy to address the deficits listed in the problem list box on initial evaluation, provide pt/family education and to maximize pt's level of " independence in the home and community environment.     Patient's spiritual, cultural and educational needs considered and pt agreeable to plan of care and goals.     Anticipated barriers to physical therapy: chronicity of condition, transportation        Goals: Short Term Goals: 5 weeks   Patient will be independent with Home exercise program to supplement therapy met  Patient will improve bilateral middle trap strength to 4/5 to improve tolerance to functional tasks met  Patient will be able to sleep throughout the night without muscle spasms to improve quality of life progressing,not met  Patient will be able to sit for 30 minutes without back support to improve sitting tolerance progressing,not met     Long Term Goals: 10 weeks   Patient FOTO score will improve to 53% to improve tolerance to functional tasks progressing,not met  Patient will be able to read for prolonged periods of time without increase in neck or back pain progressing,not met  Patient will be able to perform household tasks without increase in middle back pain to improve tolerance to perform functional tasks. progressing,not met    Plan     Improve mobility and postural strength    Pilar Persaud, PT,DPT  01/30/2024

## 2024-01-30 ENCOUNTER — CLINICAL SUPPORT (OUTPATIENT)
Dept: REHABILITATION | Facility: HOSPITAL | Age: 34
End: 2024-01-30
Payer: MEDICARE

## 2024-01-30 DIAGNOSIS — R29.898 DECREASED ROM OF NECK: ICD-10-CM

## 2024-01-30 DIAGNOSIS — R29.898 DECREASED STRENGTH OF TRUNK AND BACK: ICD-10-CM

## 2024-01-30 DIAGNOSIS — R29.3 POSTURE IMBALANCE: Primary | ICD-10-CM

## 2024-01-30 PROCEDURE — 97110 THERAPEUTIC EXERCISES: CPT | Mod: PN

## 2024-01-30 PROCEDURE — 97112 NEUROMUSCULAR REEDUCATION: CPT | Mod: PN

## 2024-01-30 RX ORDER — OMEPRAZOLE 20 MG/1
20 CAPSULE, DELAYED RELEASE ORAL
Qty: 30 CAPSULE | Refills: 10 | Status: SHIPPED | OUTPATIENT
Start: 2024-01-30

## 2024-01-30 NOTE — TELEPHONE ENCOUNTER
No care due was identified.  [facility] Embedded Care Due Messages. Reference number: [id number].   1/30/2024 10:56:43 AM CST

## 2024-02-08 ENCOUNTER — PATIENT MESSAGE (OUTPATIENT)
Dept: INTERNAL MEDICINE | Facility: CLINIC | Age: 34
End: 2024-02-08
Payer: MEDICARE

## 2024-02-27 ENCOUNTER — PATIENT MESSAGE (OUTPATIENT)
Dept: SPINE | Facility: CLINIC | Age: 34
End: 2024-02-27
Payer: MEDICARE

## 2024-02-27 DIAGNOSIS — M54.9 UPPER BACK PAIN: ICD-10-CM

## 2024-02-27 DIAGNOSIS — R25.9 ABNORMAL MOVEMENTS: ICD-10-CM

## 2024-02-27 DIAGNOSIS — M47.816 SPONDYLOSIS OF LUMBAR REGION WITHOUT MYELOPATHY OR RADICULOPATHY: ICD-10-CM

## 2024-02-27 DIAGNOSIS — R20.0 ANESTHESIA OF SKIN: Primary | ICD-10-CM

## 2024-02-27 DIAGNOSIS — Z00.00 ENCOUNTER FOR MEDICARE ANNUAL WELLNESS EXAM: ICD-10-CM

## 2024-02-27 DIAGNOSIS — M43.6 NECK STIFFNESS: ICD-10-CM

## 2024-02-28 NOTE — TELEPHONE ENCOUNTER
I will order a MRI to rule out spinae etiology and refer her to neurology for the twitching/jerking. Please let her know

## 2024-02-29 ENCOUNTER — TELEPHONE (OUTPATIENT)
Dept: ADMINISTRATIVE | Facility: OTHER | Age: 34
End: 2024-02-29
Payer: MEDICARE

## 2024-02-29 ENCOUNTER — PATIENT MESSAGE (OUTPATIENT)
Dept: ADMINISTRATIVE | Facility: OTHER | Age: 34
End: 2024-02-29
Payer: MEDICARE

## 2024-02-29 NOTE — TELEPHONE ENCOUNTER
JUDI with scheduled Neurology appointment of 6-, and contact number provided for any questions. My Chart message to be sent.

## 2024-03-11 ENCOUNTER — HOSPITAL ENCOUNTER (EMERGENCY)
Facility: HOSPITAL | Age: 34
Discharge: PSYCHIATRIC OR SUBSTANCE USE FACILITY | End: 2024-03-12
Attending: EMERGENCY MEDICINE
Payer: MEDICARE

## 2024-03-11 DIAGNOSIS — R45.1 AGITATION: ICD-10-CM

## 2024-03-11 DIAGNOSIS — R41.82 ALTERED MENTAL STATUS, UNSPECIFIED ALTERED MENTAL STATUS TYPE: Primary | ICD-10-CM

## 2024-03-11 DIAGNOSIS — R41.82 ALTERED MENTAL STATUS: ICD-10-CM

## 2024-03-11 LAB
ALBUMIN SERPL BCP-MCNC: 2.9 G/DL (ref 3.5–5.2)
ALBUMIN SERPL BCP-MCNC: 3.7 G/DL (ref 3.5–5.2)
ALLENS TEST: ABNORMAL
ALP SERPL-CCNC: 41 U/L (ref 55–135)
ALP SERPL-CCNC: 52 U/L (ref 55–135)
ALT SERPL W/O P-5'-P-CCNC: 11 U/L (ref 10–44)
ALT SERPL W/O P-5'-P-CCNC: 13 U/L (ref 10–44)
AMPHET+METHAMPHET UR QL: ABNORMAL
ANION GAP SERPL CALC-SCNC: 15 MMOL/L (ref 8–16)
ANION GAP SERPL CALC-SCNC: 20 MMOL/L (ref 8–16)
ANION GAP SERPL CALC-SCNC: 5 MMOL/L (ref 8–16)
APAP SERPL-MCNC: <3 UG/ML (ref 10–20)
AST SERPL-CCNC: 16 U/L (ref 10–40)
AST SERPL-CCNC: 16 U/L (ref 10–40)
B-HCG UR QL: NEGATIVE
BARBITURATES UR QL SCN>200 NG/ML: NEGATIVE
BASOPHILS # BLD AUTO: 0.04 K/UL (ref 0–0.2)
BASOPHILS NFR BLD: 0.3 % (ref 0–1.9)
BENZODIAZ UR QL SCN>200 NG/ML: NEGATIVE
BILIRUB SERPL-MCNC: 0.2 MG/DL (ref 0.1–1)
BILIRUB SERPL-MCNC: 0.2 MG/DL (ref 0.1–1)
BILIRUB UR QL STRIP: NEGATIVE
BNP SERPL-MCNC: <10 PG/ML (ref 0–99)
BUN SERPL-MCNC: 11 MG/DL (ref 6–20)
BUN SERPL-MCNC: 4 MG/DL (ref 6–30)
BUN SERPL-MCNC: 8 MG/DL (ref 6–20)
BZE UR QL SCN: NEGATIVE
CALCIUM SERPL-MCNC: 7.3 MG/DL (ref 8.7–10.5)
CALCIUM SERPL-MCNC: 8.4 MG/DL (ref 8.7–10.5)
CANNABINOIDS UR QL SCN: NEGATIVE
CHLORIDE SERPL-SCNC: 113 MMOL/L (ref 95–110)
CHLORIDE SERPL-SCNC: 115 MMOL/L (ref 95–110)
CHLORIDE SERPL-SCNC: 120 MMOL/L (ref 95–110)
CK SERPL-CCNC: 69 U/L (ref 20–180)
CLARITY UR: CLEAR
CO2 SERPL-SCNC: 15 MMOL/L (ref 23–29)
CO2 SERPL-SCNC: 9 MMOL/L (ref 23–29)
COLOR UR: YELLOW
CREAT SERPL-MCNC: 0.6 MG/DL (ref 0.5–1.4)
CREAT SERPL-MCNC: 0.6 MG/DL (ref 0.5–1.4)
CREAT SERPL-MCNC: 0.8 MG/DL (ref 0.5–1.4)
CREAT UR-MCNC: 50.9 MG/DL (ref 15–325)
CTP QC/QA: YES
CTP QC/QA: YES
DELSYS: ABNORMAL
DIFFERENTIAL METHOD BLD: ABNORMAL
EOSINOPHIL # BLD AUTO: 0.1 K/UL (ref 0–0.5)
EOSINOPHIL NFR BLD: 0.4 % (ref 0–8)
ERYTHROCYTE [DISTWIDTH] IN BLOOD BY AUTOMATED COUNT: 16.2 % (ref 11.5–14.5)
EST. GFR  (NO RACE VARIABLE): >60 ML/MIN/1.73 M^2
EST. GFR  (NO RACE VARIABLE): >60 ML/MIN/1.73 M^2
ETHANOL SERPL-MCNC: <10 MG/DL
GLUCOSE SERPL-MCNC: 81 MG/DL (ref 70–110)
GLUCOSE SERPL-MCNC: 88 MG/DL (ref 70–110)
GLUCOSE SERPL-MCNC: 90 MG/DL (ref 70–110)
GLUCOSE UR QL STRIP: NEGATIVE
HCT VFR BLD AUTO: 32.5 % (ref 37–48.5)
HCT VFR BLD CALC: 22 %PCV (ref 36–54)
HGB BLD-MCNC: 9.9 G/DL (ref 12–16)
HGB UR QL STRIP: NEGATIVE
IMM GRANULOCYTES # BLD AUTO: 0.03 K/UL (ref 0–0.04)
IMM GRANULOCYTES NFR BLD AUTO: 0.3 % (ref 0–0.5)
KETONES UR QL STRIP: ABNORMAL
LEUKOCYTE ESTERASE UR QL STRIP: NEGATIVE
LYMPHOCYTES # BLD AUTO: 1.2 K/UL (ref 1–4.8)
LYMPHOCYTES NFR BLD: 10 % (ref 18–48)
MAGNESIUM SERPL-MCNC: 2.2 MG/DL (ref 1.6–2.6)
MCH RBC QN AUTO: 25.1 PG (ref 27–31)
MCHC RBC AUTO-ENTMCNC: 30.5 G/DL (ref 32–36)
MCV RBC AUTO: 82 FL (ref 82–98)
METHADONE UR QL SCN>300 NG/ML: NEGATIVE
MONOCYTES # BLD AUTO: 0.6 K/UL (ref 0.3–1)
MONOCYTES NFR BLD: 4.9 % (ref 4–15)
NEUTROPHILS # BLD AUTO: 9.7 K/UL (ref 1.8–7.7)
NEUTROPHILS NFR BLD: 84.1 % (ref 38–73)
NITRITE UR QL STRIP: NEGATIVE
NRBC BLD-RTO: 0 /100 WBC
OPIATES UR QL SCN: NEGATIVE
PCP UR QL SCN>25 NG/ML: NEGATIVE
PH UR STRIP: 5 [PH] (ref 5–8)
PLATELET # BLD AUTO: ABNORMAL K/UL (ref 150–450)
PLATELET BLD QL SMEAR: ABNORMAL
PMV BLD AUTO: ABNORMAL FL (ref 9.2–12.9)
POC IONIZED CALCIUM: 1.2 MMOL/L (ref 1.06–1.42)
POC TCO2 (MEASURED): 17 MMOL/L (ref 23–29)
POTASSIUM BLD-SCNC: 3.2 MMOL/L (ref 3.5–5.1)
POTASSIUM SERPL-SCNC: 3.4 MMOL/L (ref 3.5–5.1)
POTASSIUM SERPL-SCNC: 3.5 MMOL/L (ref 3.5–5.1)
PROT SERPL-MCNC: 4.8 G/DL (ref 6–8.4)
PROT SERPL-MCNC: 6.4 G/DL (ref 6–8.4)
PROT UR QL STRIP: ABNORMAL
RBC # BLD AUTO: 3.95 M/UL (ref 4–5.4)
SALICYLATES SERPL-MCNC: <5 MG/DL (ref 15–30)
SAMPLE: ABNORMAL
SARS-COV-2 RDRP RESP QL NAA+PROBE: NEGATIVE
SITE: ABNORMAL
SODIUM BLD-SCNC: 145 MMOL/L (ref 136–145)
SODIUM SERPL-SCNC: 139 MMOL/L (ref 136–145)
SODIUM SERPL-SCNC: 140 MMOL/L (ref 136–145)
SP GR UR STRIP: 1.01 (ref 1–1.03)
TOXICOLOGY INFORMATION: ABNORMAL
TROPONIN I SERPL DL<=0.01 NG/ML-MCNC: <0.006 NG/ML (ref 0–0.03)
TSH SERPL DL<=0.005 MIU/L-ACNC: 2.63 UIU/ML (ref 0.4–4)
URN SPEC COLLECT METH UR: ABNORMAL
UROBILINOGEN UR STRIP-ACNC: NEGATIVE EU/DL
WBC # BLD AUTO: 11.51 K/UL (ref 3.9–12.7)

## 2024-03-11 PROCEDURE — 85025 COMPLETE CBC W/AUTO DIFF WBC: CPT | Performed by: EMERGENCY MEDICINE

## 2024-03-11 PROCEDURE — 82565 ASSAY OF CREATININE: CPT

## 2024-03-11 PROCEDURE — 84295 ASSAY OF SERUM SODIUM: CPT

## 2024-03-11 PROCEDURE — 82550 ASSAY OF CK (CPK): CPT | Performed by: EMERGENCY MEDICINE

## 2024-03-11 PROCEDURE — 83880 ASSAY OF NATRIURETIC PEPTIDE: CPT | Performed by: EMERGENCY MEDICINE

## 2024-03-11 PROCEDURE — 99285 EMERGENCY DEPT VISIT HI MDM: CPT | Mod: 25

## 2024-03-11 PROCEDURE — 96361 HYDRATE IV INFUSION ADD-ON: CPT

## 2024-03-11 PROCEDURE — 80179 DRUG ASSAY SALICYLATE: CPT | Performed by: EMERGENCY MEDICINE

## 2024-03-11 PROCEDURE — 95819 EEG AWAKE AND ASLEEP: CPT | Mod: 26,,, | Performed by: PSYCHIATRY & NEUROLOGY

## 2024-03-11 PROCEDURE — 81003 URINALYSIS AUTO W/O SCOPE: CPT | Performed by: EMERGENCY MEDICINE

## 2024-03-11 PROCEDURE — 82330 ASSAY OF CALCIUM: CPT

## 2024-03-11 PROCEDURE — 81025 URINE PREGNANCY TEST: CPT | Performed by: EMERGENCY MEDICINE

## 2024-03-11 PROCEDURE — 99900035 HC TECH TIME PER 15 MIN (STAT)

## 2024-03-11 PROCEDURE — 95819 EEG AWAKE AND ASLEEP: CPT

## 2024-03-11 PROCEDURE — 80047 BASIC METABLC PNL IONIZED CA: CPT

## 2024-03-11 PROCEDURE — 93010 ELECTROCARDIOGRAM REPORT: CPT | Mod: ,,, | Performed by: INTERNAL MEDICINE

## 2024-03-11 PROCEDURE — 82077 ASSAY SPEC XCP UR&BREATH IA: CPT | Performed by: EMERGENCY MEDICINE

## 2024-03-11 PROCEDURE — 83735 ASSAY OF MAGNESIUM: CPT | Performed by: EMERGENCY MEDICINE

## 2024-03-11 PROCEDURE — 63600175 PHARM REV CODE 636 W HCPCS: Performed by: EMERGENCY MEDICINE

## 2024-03-11 PROCEDURE — 85014 HEMATOCRIT: CPT

## 2024-03-11 PROCEDURE — 87635 SARS-COV-2 COVID-19 AMP PRB: CPT | Performed by: EMERGENCY MEDICINE

## 2024-03-11 PROCEDURE — 80143 DRUG ASSAY ACETAMINOPHEN: CPT | Performed by: EMERGENCY MEDICINE

## 2024-03-11 PROCEDURE — 84484 ASSAY OF TROPONIN QUANT: CPT | Performed by: EMERGENCY MEDICINE

## 2024-03-11 PROCEDURE — G0427 INPT/ED TELECONSULT70: HCPCS | Mod: 95,,, | Performed by: PSYCHIATRY & NEUROLOGY

## 2024-03-11 PROCEDURE — 80307 DRUG TEST PRSMV CHEM ANLYZR: CPT | Performed by: EMERGENCY MEDICINE

## 2024-03-11 PROCEDURE — 93005 ELECTROCARDIOGRAM TRACING: CPT

## 2024-03-11 PROCEDURE — 96372 THER/PROPH/DIAG INJ SC/IM: CPT | Performed by: EMERGENCY MEDICINE

## 2024-03-11 PROCEDURE — 80053 COMPREHEN METABOLIC PANEL: CPT | Performed by: EMERGENCY MEDICINE

## 2024-03-11 PROCEDURE — 84443 ASSAY THYROID STIM HORMONE: CPT | Performed by: EMERGENCY MEDICINE

## 2024-03-11 PROCEDURE — 84132 ASSAY OF SERUM POTASSIUM: CPT

## 2024-03-11 PROCEDURE — 96360 HYDRATION IV INFUSION INIT: CPT

## 2024-03-11 PROCEDURE — 99284 EMERGENCY DEPT VISIT MOD MDM: CPT | Mod: ,,, | Performed by: INTERNAL MEDICINE

## 2024-03-11 PROCEDURE — 25000003 PHARM REV CODE 250: Performed by: EMERGENCY MEDICINE

## 2024-03-11 RX ORDER — SODIUM CHLORIDE 9 MG/ML
1000 INJECTION, SOLUTION INTRAVENOUS
Status: COMPLETED | OUTPATIENT
Start: 2024-03-11 | End: 2024-03-11

## 2024-03-11 RX ORDER — LORAZEPAM 2 MG/ML
2 INJECTION INTRAMUSCULAR
Status: COMPLETED | OUTPATIENT
Start: 2024-03-11 | End: 2024-03-11

## 2024-03-11 RX ORDER — DIPHENHYDRAMINE HYDROCHLORIDE 50 MG/ML
50 INJECTION INTRAMUSCULAR; INTRAVENOUS
Status: COMPLETED | OUTPATIENT
Start: 2024-03-11 | End: 2024-03-11

## 2024-03-11 RX ADMIN — DIPHENHYDRAMINE HYDROCHLORIDE 50 MG: 50 INJECTION, SOLUTION INTRAMUSCULAR; INTRAVENOUS at 01:03

## 2024-03-11 RX ADMIN — SODIUM CHLORIDE 1000 ML: 9 INJECTION, SOLUTION INTRAVENOUS at 01:03

## 2024-03-11 RX ADMIN — SODIUM CHLORIDE 1000 ML: 9 INJECTION, SOLUTION INTRAVENOUS at 02:03

## 2024-03-11 RX ADMIN — SODIUM CHLORIDE 1000 ML: 9 INJECTION, SOLUTION INTRAVENOUS at 07:03

## 2024-03-11 RX ADMIN — LORAZEPAM 2 MG: 2 INJECTION INTRAMUSCULAR; INTRAVENOUS at 01:03

## 2024-03-11 NOTE — ED NOTES
Pt noted to step out of bed, throw herself to her knees, and flail about. Verbally redirected with assistance back into bed. Pt then began thrashing about in bed, MD at bedside at this time. Several nurses at bedside attempting to ensure pt doesn't harm herself. Attempted to verbally redirect pt, which she responds to intermittently. MD to order medications.

## 2024-03-11 NOTE — PROCEDURES
Routine EEG Report    Patsy Akins  [id number]  1990    DATE OF SERVICE: 3/11/2024  REASON FOR CONSULT:  34-year-old woman with episodes of decreased responsiveness with abnormal movements.  Evaluate for evidence of epileptiform activity.    METHODOLOGY   Electroencephalographic (EEG) recording is with electrodes placed according to the International 10-20 placement system.  Thirty two (32) channels of digital signal (sampling rate of 512/sec) including T1 and T2 was simultaneously recorded from the scalp and may include  EKG, EMG, and/or eye monitors.  Recording band pass was 0.1 to 512 hz.  Digital video recording of the patient is simultaneously recorded with the EEG.  The patient is instructed report clinical symptoms which may occur during the recording session.  EEG and video recording is stored and archived in digital format. Activation procedures which include photic stimulation, hyperventilation and instructing patients to perform simple task are done in selected patients.    The EEG is displayed on a monitor screen and can be reviewed using different montages.  Computer assisted analysis is employed to detect spike and electrographic seizure activity.   The entire record is submitted for computer analysis.  The entire recording is visually reviewed and the times identified by computer analysis as being spikes or seizures are reviewed again.  Compresses spectral analysis (CSA) is also performed on the activity recorded from each individual channel.  This is displayed as a power display of frequencies from 0 to 30 Hz over time.   The CSA is reviewed looking for asymmetries in power between homologous areas of the scalp and then compared with the original EEG recording.     [employer] software is also utilized in the review of this study.  This software suite analyzes the EEG recording in multiple domains.  Coherence and rhythmicity is computed to identify EEG sections which may contain organized seizures.   Each channel undergoes analysis to detect presence of spike and sharp waves which have special and morphological characteristic of epileptic activity.  The routine EEG recording is converted from spacial into frequency domain.  This is then displayed comparing homologous areas to identify areas of significant asymmetry.  Algorithm to identify non-cortically generated artifact is used to separate eye movement, EMG and other artifact from the EEG.      EEG FINDINGS  Background activity:   The waking background is continuous and relatively symmetric mixed frequency activity.  There are excessive overriding faster frequencies seen diffusely obscuring the posterior dominant rhythm.    Sleep:  The patient transitions from wakefulness to sleep with the appearance of sleep spindles, K complexes, and vertex waves.    Activation procedures:   The patient is able to follow simple commands and answers orientation questions correctly.     Cardiac Monitor:   Heart rate appears generally regular on a single lead EKG.    Impression:   This is an unremarkable awake and asleep routine EEG.  Overriding faster frequencies are often seen as a medication effect i.e. lorazepam. There are no prominent focal findings, no epileptiform discharges, and no electrographic seizures.   Of note, a normal EEG does not rule out the diagnosis of epilepsy.  Clinical correlation is advised.    Jennifer Mahmood MD PhD [facility]  Neurology-Epilepsy  [facility].
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
## 2024-03-11 NOTE — ED PROVIDER NOTES
"Encounter Date: 3/11/2024    SCRIBE #1 NOTE: I, Zoila Burgos, am scribing for, and in the presence of,  Noé Seymour MD. I have scribed the following portions of the note - Other sections scribed: HPI, ROS, PE.       History     Chief Complaint   Patient presents with    Seizures     Pt BIB EMS for a seizure. EMS reported Pt stated she was feeling shaky and then woke on the floor, and stated she feels as if she had a seizure. EMS reported while en route Pt became extremely anxious and then began flailing around on the stretcher and was uncooperative. EMS reported they had to restrain Pt, and reported Pt stated she doesn't feel normal and brain is telling her she is not real. Pt denied SI/HI. Pt has seizure hx and stated she has been compliant with medication.     Ms. "Ana" Patsy Akins is a female that presents to the ED with reported subjective seizure today PTA and bizarre behavior. She reports she "felt unreal, like I left my body." States she lives alone but "has neighbors who live right next to me." Denies any illicit drug use. No other exacerbating or alleviating factors. PMHx significant for Bipolar 1 disorder, HTN, GERD, chronic back pain, seizures.    The history is provided by the patient. The history is limited by the condition of the patient. No  was used.     Review of patient's allergies indicates:   Allergen Reactions    Lamotrigine Hives and Itching     Past Medical History:   Diagnosis Date    Bipolar affective     Bipolar disorder with depression     Anxiety disorder    Chronic back pain     Hypokalemia     Irritable bowel syndrome      No past surgical history on file.  Family History   Problem Relation Age of Onset    Mental illness Mother     Alcohol abuse Mother     Depression Mother     Alcohol abuse Father     Depression Father     Alcohol abuse Maternal Aunt     Alcohol abuse Maternal Grandmother     Arthritis Maternal Grandmother     Stroke Maternal " Grandmother     Asthma Brother     Depression Brother     Learning disabilities Brother     Mental illness Brother     Depression Paternal Grandmother     Diabetes Paternal Grandmother     Stroke Paternal Grandmother     Mental illness Paternal Aunt     Miscarriages / Stillbirths Paternal Aunt     Stroke Paternal Grandfather      Social History     Tobacco Use    Smoking status: Never    Smokeless tobacco: Never    Tobacco comments:     Don't like it.   Substance Use Topics    Alcohol use: Not Currently     Alcohol/week: 0.0 standard drinks of alcohol     Comment: Don't drink.    Drug use: No     Review of Systems   Unable to perform ROS: Psychiatric disorder       Physical Exam     Initial Vitals [03/11/24 1304]   BP Pulse Resp Temp SpO2   (!) 140/88 (!) 120 (!) 22 98 °F (36.7 °C) 100 %      MAP       --         Physical Exam    Nursing note and vitals reviewed.  Constitutional:   Appears thin.    HENT:   Head: Normocephalic and atraumatic.   Mouth is dry. Poor dentition.    Eyes: EOM are normal.   Cardiovascular:  Regular rhythm and normal heart sounds.   Tachycardia present.         No murmur heard.  Pulmonary/Chest: Breath sounds normal. No respiratory distress.   Abdominal: Abdomen is soft. Bowel sounds are normal. She exhibits no distension and no mass. There is no abdominal tenderness. There is no guarding.   Musculoskeletal:         General: No tenderness or edema. Normal range of motion.     Neurological: She is oriented to person, place, and time.   Intermittently falls asleep but then wakes up and answers questions appropriately.    Skin: Skin is warm.   No trauma to skin noted.    Psychiatric:   Transient psychosis: See MDM.          ED Course   Procedures  Labs Reviewed   URINALYSIS, REFLEX TO URINE CULTURE - Abnormal; Notable for the following components:       Result Value    Protein, UA Trace (*)     Ketones, UA Trace (*)     All other components within normal limits    Narrative:     Specimen  Source->Urine   ACETAMINOPHEN LEVEL - Abnormal; Notable for the following components:    Acetaminophen (Tylenol), Serum <3.0 (*)     All other components within normal limits   COMPREHENSIVE METABOLIC PANEL - Abnormal; Notable for the following components:    Chloride 115 (*)     CO2 9 (*)     Calcium 8.4 (*)     Alkaline Phosphatase 52 (*)     All other components within normal limits    Narrative:     CO2 critical result(s) called and verbal readback obtained from   Pilar Lin @2:39PM; 3/11/2024 by CRISTOBAL 03/11/2024 14:39   CBC W/ AUTO DIFFERENTIAL - Abnormal; Notable for the following components:    RBC 3.95 (*)     Hemoglobin 9.9 (*)     Hematocrit 32.5 (*)     MCH 25.1 (*)     MCHC 30.5 (*)     RDW 16.2 (*)     Gran # (ANC) 9.7 (*)     Gran % 84.1 (*)     Lymph % 10.0 (*)     All other components within normal limits   SALICYLATE LEVEL - Abnormal; Notable for the following components:    Salicylate Lvl <5.0 (*)     All other components within normal limits   DRUG SCREEN PANEL, URINE EMERGENCY - Abnormal; Notable for the following components:    Amphetamine Screen, Ur Presumptive Positive (*)     All other components within normal limits    Narrative:     Specimen Source->Urine   COMPREHENSIVE METABOLIC PANEL - Abnormal; Notable for the following components:    Potassium 3.4 (*)     Chloride 120 (*)     CO2 15 (*)     Calcium 7.3 (*)     Total Protein 4.8 (*)     Albumin 2.9 (*)     Alkaline Phosphatase 41 (*)     Anion Gap 5 (*)     All other components within normal limits   COMPREHENSIVE METABOLIC PANEL - Abnormal; Notable for the following components:    Chloride 121 (*)     CO2 15 (*)     Calcium 7.5 (*)     Total Protein 4.7 (*)     Albumin 2.8 (*)     Alkaline Phosphatase 39 (*)     Anion Gap 3 (*)     All other components within normal limits   ISTAT PROCEDURE - Abnormal; Notable for the following components:    POC BUN 4 (*)     POC Potassium 3.2 (*)     POC Chloride 113 (*)     POC TCO2 (MEASURED) 17  (*)     POC Anion Gap 20 (*)     POC Hematocrit 22 (*)     All other components within normal limits   ALCOHOL,MEDICAL (ETHANOL)   TSH   CK   MAGNESIUM   B-TYPE NATRIURETIC PEPTIDE   DRUG SCREEN PANEL, URINE EMERGENCY   B-TYPE NATRIURETIC PEPTIDE   TROPONIN I   TROPONIN I   SARS-COV-2 RDRP GENE   POCT URINE PREGNANCY     EKG Readings: (Independently Interpreted)   Initial Reading: No STEMI. Previous EKG: Compared with most recent EKG Rhythm: Sinus Tachycardia. Ectopy: No Ectopy. Other Findings: Prolonged QT Interval. Other Impression: Nonspecific ST abnormalities       Imaging Results              X-Ray Chest AP Portable (Final result)  Result time 03/11/24 15:04:35      Final result by Álvaro Castaneda MD (03/11/24 15:04:35)                   Impression:      No acute abnormality.      Electronically signed by: Álvaro Castaneda  Date:    03/11/2024  Time:    15:04               Narrative:    EXAMINATION:  XR CHEST AP PORTABLE    CLINICAL HISTORY:  Altered mental status, unspecified    TECHNIQUE:  Single frontal view of the chest was performed.    COMPARISON:  09/27/2021    FINDINGS:  The lungs are clear, with normal appearance of pulmonary vasculature and no pleural effusion or pneumothorax.    The cardiac silhouette is normal in size. The hilar and mediastinal contours are unremarkable.    Bones are intact.                                       CT Head Without Contrast (Final result)  Result time 03/11/24 14:25:26      Final result by Candelario Gregory MD (03/11/24 14:25:26)                   Impression:      No acute intracranial pathology, noting motion degraded exam.      Electronically signed by: Candelario Gregory  Date:    03/11/2024  Time:    14:25               Narrative:    EXAMINATION:  CT HEAD WITHOUT CONTRAST    CLINICAL HISTORY:  Mental status change, unknown cause;    TECHNIQUE:  Low dose axial CT images obtained throughout the head without intravenous contrast. Sagittal and coronal reconstructions were  performed.    COMPARISON:  CT head without contrast 07/11/2022.    FINDINGS:  Exam degraded by patient motion artifact.    Intracranial compartment:    Ventricles and sulci are normal in size for age without evidence of hydrocephalus. No extra-axial blood or fluid collections.    The brain parenchyma appears normal. No parenchymal mass, hemorrhage, edema or major vascular distribution infarct.    Skull/extracranial contents (limited evaluation): No fracture. Mastoid air cells and paranasal sinuses are essentially clear.                                       Medications   LORazepam injection 2 mg (2 mg Intramuscular Given 3/11/24 1332)   diphenhydrAMINE injection 50 mg (50 mg Intramuscular Given 3/11/24 1332)   sodium chloride 0.9% bolus 1,000 mL 1,000 mL (0 mLs Intravenous Stopped 3/11/24 1455)   sodium chloride 0.9% bolus 1,000 mL 1,000 mL (0 mLs Intravenous Stopped 3/11/24 1555)   0.9%  NaCl infusion (0 mLs Intravenous Stopped 3/11/24 2335)   sodium chloride 0.9% bolus 1,000 mL 1,000 mL (0 mLs Intravenous Stopped 3/12/24 0400)     Medical Decision Making  When I entered room, patient was sitting on the floor, she jumped up and jumped back in bed and very animated way and began shaking violently, had to be restrained for her safety.  Did not hit her head on anything that I noticed, but did move her whole-body violently and not in arrhythmic way that appeared to be a seizure.  She was able to be calmed and then quickly recovered and was able to ask her questions which she answered as above in physical exam.  She easily falls asleep but then awakens with verbal stimulus.  She has not postictal.  She appears acutely psychotic versus acutely ill.  Differential diagnosis is broad and includes metabolic, infectious, inflammatory, neurologic, drug-induced and other etiologies.      Ms. Akins was given ativan and benadryl in the ER as well as NS 2L bolus.  She has since been resting calmly.  She was evaluated by  Psychiatry and psychiatry agreed with pec.  Did recommend neurology evaluation given reported history of possible seizure.  Neurology saw patient in the ER and ordered EEG.  EEG has been performed and epileptologist has read EEG as an unremarkable awake and asleep EEG.  Labs noted.  CBC reveals anemia seen in 2023 room and is otherwise unremarkable.  CMP reveals low CO2, 9, and elevated chloride, 115.  Unknown etiology but includes hyperventilation, medication effect, metabolic illness, dehydration, or combination of above.   Plan to repeat CMP after IV fluids.  Patient is currently resting comfortably.  Repeat CMP is pending. Noé Seymour MD, 03/11/2024 7:01 PM    Spoke w pt. Repeat cmp reveals improving co2. Pt denies any laxative use, reports takes metamucil qd. Awakens easily, is fully lucid and linear, reports feeling better. Hungry and thirsty. Pudding and juice/water given. Pt is a vegetarian.     Repeat istat chem8 noted. Possible lab error w unexpetedly low Hct. Will repeat. Pt signed out at change of shift for follow up repeat labs and final medical clearance.     Pt medically cleared and transferred to psychiatric facility for further care.       Amount and/or Complexity of Data Reviewed  Labs: ordered.  Radiology: ordered.  ECG/medicine tests: ordered and independent interpretation performed.    Risk  Prescription drug management.            Scribe Attestation:   Scribe #1: I performed the above scribed service and the documentation accurately describes the services I performed. I attest to the accuracy of the note.           Medically cleared for psychiatry placement: 3/12/2024  2:54 AM                   Clinical Impression:  Final diagnoses:  [R45.1] Agitation  [R41.82] Altered mental status          ED Disposition Condition    Transfer to Psych Facility Stable          ED Prescriptions    None       Follow-up Information    None       I, Noé Seymour MD, personally performed the services described  in this documentation. All medical record entries made by the scribe were at my direction and in my presence.  I have reviewed the chart and agree that the record reflects my personal performance and is accurate and complete.      Noé Seymour MD  03/12/24 [id number]

## 2024-03-11 NOTE — ED TRIAGE NOTES
"Pt presents to ED via EMS with c/o body jerking. Pt states "I think I had a seizure because I couldn't control my movement and I was having an out of body experience". Denies injury, hitting head, or use of blood thinners. Pt will jerk her arms and legs intermittently, no seizure like activity noted. No known hx of seizures, but states she takes topamax for migraines. Pt rambling, having bizarre comments, and intentional bizarre body movement. Denies use of drugs/alcohol, SI/HI, or A/V hallucinations.   "

## 2024-03-11 NOTE — CONSULTS
"[facility] - Emergency Dept  Neurology Consult Note    Patient Name: Patsy Akins  Age: 34 y.o.  MRN: [id number]  Admission Date: 3/11/2024  Reason for consult:  Altered mental status    CC:  Altered mental status    HPI:   Patsy Akins is a 34 y.o. year old female with past medical history of bipolar disorder, psychosis, anxiety, insomnia presented to the ED for acute psychiatric symptoms.  Neurology was consulted to evaluate for altered mental status.  History obtained from chart review and partially from patient.    Patient has significant tangential speech and emotional outbursts during history taking making it hard to obtain history which also makes history unreliable.  Patient states that she has had 1 lifetime seizure when she ran out of Wellbutrin for 1 week.  She states that current episode started around today.  She was on the phone replying to group of friends and explaining her thoughts which resulted in a stressful situation.  After this, she started experiencing significant associated symptoms, what she explains as "out-of-body feeling".  She states that she has been having significant stuttering and whole-body shaking since afternoon is unable to express her thoughts.  She constantly repeats, "I have never had it this bad" throughout the history taking and remains unable to be redirected to provide history.  She however reports compliance to her medications.  Extensive chart review including Care everywhere negative for prior EEG data.    In the ED, vitals remarkable for /88, pulse rate 120.  Psychiatry was consulted in the ED and patient was PEC'd.  Neurology consulted to rule out any concern for seizures and postictal psychosis.      Histories:  Allergies:  Lamotrigine    Current Medications:    No current facility-administered medications for this encounter.     Current Outpatient Medications   Medication Sig Dispense Refill    buPROPion (WELLBUTRIN SR) 150 MG TBSR 12 hr tablet TAKE 2 " TABLETS BY MOUTH EVERY MORNING AND TAKE 1 TABLET EVERY EVENING  0    desloratadine (CLARINEX) 5 mg tablet Take 1 tablet (5 mg total) by mouth once daily. 30 tablet 1    hydrOXYzine HCL (ATARAX) 25 MG tablet Take 1 tablet (25 mg total) by mouth 3 (three) times daily as needed for Anxiety. 20 tablet 0    melatonin (MELATIN) 5 mg Take by mouth.      omeprazole (PRILOSEC) 20 MG capsule TAKE 1 CAPSULE BY MOUTH DAILY 30 capsule 10    ondansetron (ZOFRAN-ODT) 4 MG TbDL Take 1 tablet (4 mg total) by mouth every 8 (eight) hours as needed (nausea/vomiting). 15 tablet 0    OXcarbazepine (TRILEPTAL) 300 MG Tab Take 0.5 tablets (150 mg total) by mouth 2 (two) times daily. 180 tablet 2    topiramate (TOPAMAX) 200 MG Tab TAKE 1 TABLET BY MOUTH EVERY EVENING 30 tablet 10    vortioxetine (TRINTELLIX) 10 mg Tab Take 10 mg by mouth.         Medical:   Past Medical History:   Diagnosis Date    Bipolar affective     Bipolar disorder with depression     Anxiety disorder    Chronic back pain     Hypokalemia     Irritable bowel syndrome         Surgeries: No past surgical history on file.     Family:   Family History   Problem Relation Age of Onset    Mental illness Mother     Alcohol abuse Mother     Depression Mother     Alcohol abuse Father     Depression Father     Alcohol abuse Maternal Aunt     Alcohol abuse Maternal Grandmother     Arthritis Maternal Grandmother     Stroke Maternal Grandmother     Asthma Brother     Depression Brother     Learning disabilities Brother     Mental illness Brother     Depression Paternal Grandmother     Diabetes Paternal Grandmother     Stroke Paternal Grandmother     Mental illness Paternal Aunt     Miscarriages / Stillbirths Paternal Aunt     Stroke Paternal Grandfather        Tobacco Use    Smoking status: Never    Smokeless tobacco: Never    Tobacco comments:     Don't like it.   Substance and Sexual Activity    Alcohol use: Not Currently     Alcohol/week: 0.0 standard drinks of alcohol      "Comment: Don't drink.    Drug use: No    Sexual activity: Never         Physical Exam:     Physical Examination  BP (!) 95/55   Pulse 100   Temp 97.4 °F (36.3 °C)   Resp 17   Ht 5' 3" (1.6 m)   Wt 50.8 kg (112 lb)   SpO2 98%   Breastfeeding No   BMI 19.84 kg/m²   Body mass index is 19.84 kg/m².    Neurological Exam  Mental Status:   Alert and oriented to name, date, location, president.   Naming/Fluency/Comprehension/Repetition intact.   Recent/remote memory, registration, attention span/concentration, fund of knowledge intact by history.    CN:   II, III, IV, VI: PERRL, EOMI, no nystagmus, fundus not visualized due to inadequate dilation..VII: symmetrical facial movement, nice smile, no drooping..XII: tongue midline, 5/5, no deviation or fasciculation.             Motor:   Moving all extremities antigravity and equally                Reflexes:   Downgoing toes bilaterally    Sensation: on both UEs and LEs    Light Touch: Normal    Coordination:    Whole-body not rhythmic jerking, no geraldo tremors noted    Gait:    Not tested    Significant Labs:   Recent Lab Results  (Last 5 results in the past 24 hours)        03/11/24  1553   03/11/24  1549   03/11/24  1548   03/11/24  1431   03/11/24  1352        Benzodiazepines   Negative             Methadone metabolites   Negative             Phencyclidine   Negative             Acetaminophen Level         <3.0  Comment: Toxic Levels:  Adults (4 hr post-ingestion).........>150 ug/mL  Adults (12 hr post-ingestion)........>40 ug/mL  Peds (2 hr post-ingestion, liquid)...>225 ug/mL         Albumin         3.7       Alcohol, Serum         <10       ALP         52       ALT         13       Amphetamines, Urine   Presumptive Positive             Anion Gap         15       Appearance, UA     Clear           AST         16       Barbituates, Urine   Negative             Baso #         0.04       Basophil %         0.3       Bilirubin (UA)     Negative           BILIRUBIN TOTAL "         0.2  Comment: For infants and newborns, interpretation of results should be based  on gestational age, weight and in agreement with clinical  observations.    Premature Infant recommended reference ranges:  Up to 24 hours.............<8.0 mg/dL  Up to 48 hours............<12.0 mg/dL  3-5 days..................<15.0 mg/dL  6-29 days.................<15.0 mg/dL         BNP         <10  Comment: Values of less than 100 pg/ml are consistent with non-CHF populations.       BUN         11       Calcium         8.4       Chloride         115       CO2         9  Comment: CO2 critical result(s) called and verbal readback obtained from   Pilar Lin @2:39PM; 3/11/2024 by CRISTOBAL 03/11/2024 14:39         Cocaine, Urine   Negative             Color, UA     Yellow           CPK         69       Creatinine         0.8       Urine Creatinine   50.9             Differential Method         Automated       eGFR         >60       Eos #         0.1       Eos %         0.4       Glucose         90       Glucose, UA     Negative           Gran # (ANC)         9.7       Gran %         84.1       Hematocrit         32.5       Hemoglobin         9.9       Immature Grans (Abs)         0.03  Comment: Mild elevation in immature granulocytes is non specific and   can be seen in a variety of conditions including stress response,   acute inflammation, trauma and pregnancy. Correlation with other   laboratory and clinical findings is essential.         Immature Granulocytes         0.3       Ketones, UA     Trace           Leukocyte Esterase, UA     Negative           Lymph #         1.2       Lymph %         10.0       Magnesium          2.2       MCH         25.1       MCHC         30.5       MCV         82       Mono #         0.6       Mono %         4.9       MPV         SEE COMMENT  Comment: Result not available.       NITRITE UA     Negative           nRBC         0       Blood, UA     Negative           Opiates, Urine   Negative              pH, UA     5.0           Platelet Estimate         Appears normal       Platelet Count         SEE COMMENT  Comment: Unable to report platelet count due to clumping.       Potassium         3.5       hCG Qualitative, Urine Negative               PROTEIN TOTAL         6.4       Protein, UA     Trace  Comment: Recommend a 24 hour urine protein or a urine   protein/creatinine ratio if globulin induced proteinuria is  clinically suspected.              Acceptable Yes       Yes         RBC         3.95       RDW         16.2       Salicylate Level         <5.0  Comment: Toxic:  30.0 - 70.0 mg/dl  Lethal: >70.0 mg/dl         SARS-CoV-2 RNA, Amplification, Qual       Negative         Sodium         139       Specific [place], UA     1.015           Specimen UA     Urine, Clean Catch           Marijuana (THC) Metabolite   Negative             Toxicology Information   SEE COMMENT  Comment: This screen includes the following classes of drugs at the listed   cut-off:    Benzodiazepines 200 ng/ml  Methadone 300 ng/ml  Cocaine metabolite 300 ng/ml  Opiates 300 ng/ml  Barbiturates 200 ng/ml  Amphetamines 1000 ng/ml  Marijuana metabs (THC) 50 ng/ml  Phencyclidine (PCP) 25 ng/ml    This is a screening test. If results do not correlate with clinical   presentation, then a confirmatory send out test is advised.     This report is intended for use in clinical monitoring and management   of   patients. It is not intended for use in employment related drug   testing.               Troponin I         <0.006  Comment: The reference interval for Troponin I represents the 99th percentile   cutoff   for our facility and is consistent with 3rd generation assay   performance.         TSH         2.631       UROBILINOGEN UA     Negative           WBC         11.51                              Significant Imaging:   Following images were personally reviewed and interpreted  CT head without contrast: NAICA    EEG:   This is  an unremarkable awake and asleep routine EEG.  Overriding faster frequencies are often seen as a medication effect i.e. lorazepam. There are no prominent focal findings, no epileptiform discharges, and no electrographic seizures.   Of note, a normal EEG does not rule out the diagnosis of epilepsy.  Clinical correlation is advised.     Assessment and Plan:   34 y.o. year old female with past medical history of bipolar disorder, psychosis, anxiety, insomnia presented to the ED for acute psychiatric symptoms.  During encounter as well as from presentation, patient has significant put her psychosis with unclear seizure activity possibly consistent with manic phase of her known bipolar disorder. However patient is on Wellbutrin with concerns for lower seizure threshold. EEG obtained without any epileptiform discharges or focal abnormality noted. Given the extensive prior psychiatric history, presentation is inconsistent with any autoimmune encephalopathy including NMDA.  UDS only positive for amphetamines.      Plan:   - EEG obtained with the concerns for focal epileptiform discharges  - will hold off on further imaging at this time given gross nonfocal exam and negative workup so far including CT head and EEG without any focal findings   - obtain blood work with vitamin B1, B9, B12, Zinc, RPR, HIV  - agree with psychiatric, hold off on Wellbutrin or any other seizure threshold lower drugs at this time  - disposition per primary team and psychiatry.  No further testing from neurological standpoint      Thank you for your consult. I will follow-up with patient. Please contact us if you have any additional questions.    Time spent on this encounter: 60 minutes. This includes face to face time and non-face to face time preparing to see the patient (eg, review of tests), obtaining and/or reviewing separately obtained history, documenting clinical information in the electronic or other health record, independently  interpreting results and communicating results to the patient/family/caregiver, or care coordinator.     Michael Abdi MD  Neurology  [facility]

## 2024-03-12 ENCOUNTER — PATIENT OUTREACH (OUTPATIENT)
Dept: ADMINISTRATIVE | Facility: HOSPITAL | Age: 34
End: 2024-03-12
Payer: MEDICARE

## 2024-03-12 VITALS
DIASTOLIC BLOOD PRESSURE: 52 MMHG | SYSTOLIC BLOOD PRESSURE: 91 MMHG | TEMPERATURE: 99 F | RESPIRATION RATE: 18 BRPM | WEIGHT: 112 LBS | OXYGEN SATURATION: 100 % | HEART RATE: 107 BPM | HEIGHT: 63 IN | BODY MASS INDEX: 19.84 KG/M2

## 2024-03-12 LAB
ALBUMIN SERPL BCP-MCNC: 2.8 G/DL (ref 3.5–5.2)
ALP SERPL-CCNC: 39 U/L (ref 55–135)
ALT SERPL W/O P-5'-P-CCNC: 10 U/L (ref 10–44)
ANION GAP SERPL CALC-SCNC: 3 MMOL/L (ref 8–16)
AST SERPL-CCNC: 16 U/L (ref 10–40)
BILIRUB SERPL-MCNC: 0.3 MG/DL (ref 0.1–1)
BUN SERPL-MCNC: 6 MG/DL (ref 6–20)
CALCIUM SERPL-MCNC: 7.5 MG/DL (ref 8.7–10.5)
CHLORIDE SERPL-SCNC: 121 MMOL/L (ref 95–110)
CO2 SERPL-SCNC: 15 MMOL/L (ref 23–29)
CREAT SERPL-MCNC: 0.7 MG/DL (ref 0.5–1.4)
EST. GFR  (NO RACE VARIABLE): >60 ML/MIN/1.73 M^2
GLUCOSE SERPL-MCNC: 75 MG/DL (ref 70–110)
OHS QRS DURATION: 86 MS
OHS QTC CALCULATION: 566 MS
POTASSIUM SERPL-SCNC: 3.5 MMOL/L (ref 3.5–5.1)
PROT SERPL-MCNC: 4.7 G/DL (ref 6–8.4)
SODIUM SERPL-SCNC: 139 MMOL/L (ref 136–145)

## 2024-03-12 PROCEDURE — 80053 COMPREHEN METABOLIC PANEL: CPT | Performed by: EMERGENCY MEDICINE

## 2024-03-12 PROCEDURE — 96361 HYDRATE IV INFUSION ADD-ON: CPT

## 2024-03-12 PROCEDURE — 25000003 PHARM REV CODE 250: Performed by: STUDENT IN AN ORGANIZED HEALTH CARE EDUCATION/TRAINING PROGRAM

## 2024-03-12 RX ADMIN — SODIUM CHLORIDE 1000 ML: 9 INJECTION, SOLUTION INTRAVENOUS at 03:03

## 2024-03-12 NOTE — PROGRESS NOTES
Non-compliant report chart audits for [facility], FLU VACCINE Chart review completed for  test overdue (mammograms, Colonoscopies, pap smears, DM labs, and/or EYE EXAMs)      Care Everywhere and media, updates requested and reviewed.      Last flu shot 1.21.21      If the patient does not wish to have a Flu vaccine, please document as follows-per MEDICARE guidelines:        no

## 2024-03-12 NOTE — ED NOTES
Report received from DAFNE Quezada; pt resting calmly in bed with ED sitter at bedside; pt denies any complaints at this time; no distress noted.

## 2024-03-12 NOTE — ED NOTES
Pt transported out in wheelchair with tech, security, and spd personnel. Pt calm and cooperative.

## 2024-03-12 NOTE — ED PROVIDER NOTES
UPDATE  Patient signed out to me at the change of shift while pending repeat CMP.  For full H and P please see previous provider note.  In brief, patient is a 34-year-old female who initially presented to the ED with subjective seizure prior to ED arrival and bizarre behavior.  On initial workup she was found to have a bicarb of 9 with a elevated chloride.  She received IV fluid hydration with multiple normal saline fluid boluses.  She also had some agitation while in the ED and received some IV Ativan as well as Benadryl.    On my assessment, patient's blood pressure 90/59.  She was sleeping comfortably in no respiratory distress distress.  Heart regular rate.  Moving all extremities with no focal neurological deficits.  Patient currently has no complaints at this time.  Repeat CMP with a bicarb of 15, with a chloride of 121 this appears improved when compared to previous bicarb of 9, with stable chloride elevation.  I do not feel that these findings are consistent or concerning for a life-threatening cause at this time.  Patient is medically cleared for inpatient psychiatric placement.  Patient is aware of this plan.  Nursing staff is also aware.      Sonal Phoenix D.O  EMERGENCY MEDICINE   6:16 AM 03/12/2024      Sonal Phoenix, DO  03/12/24 0621

## 2024-03-13 PROBLEM — Z13.9 ENCOUNTER FOR MEDICAL SCREENING EXAMINATION: Status: ACTIVE | Noted: 2024-03-13

## 2024-03-21 ENCOUNTER — E-VISIT (OUTPATIENT)
Dept: INTERNAL MEDICINE | Facility: CLINIC | Age: 34
End: 2024-03-21
Payer: MEDICARE

## 2024-03-21 DIAGNOSIS — I10 ESSENTIAL HYPERTENSION: ICD-10-CM

## 2024-03-21 DIAGNOSIS — R41.82 ALTERED MENTAL STATUS, UNSPECIFIED ALTERED MENTAL STATUS TYPE: ICD-10-CM

## 2024-03-21 DIAGNOSIS — F31.9 BIPOLAR 1 DISORDER, DEPRESSED: Primary | ICD-10-CM

## 2024-03-21 PROCEDURE — 99421 OL DIG E/M SVC 5-10 MIN: CPT | Mod: ,,, | Performed by: INTERNAL MEDICINE

## 2024-03-21 NOTE — PROGRESS NOTES
Patient ID: Patsy Akins is a 34 y.o. female.    Chief Complaint: Medication Management (Entered automatically based on patient selection in Patient Portal.)    The patient initiated a request through [employer] on 3/21/2024 for evaluation and management with a chief complaint of Medication Management (Entered automatically based on patient selection in Patient Portal.)     I evaluated the questionnaire submission on 3/21/2024.    [facility] Peq Evisit Medication    3/21/2024  1:56 PM CDT - Filed by Patient   Do you agree to participate in an E-Visit? Yes   If you have any of the following symptoms, please present to your local emergency room or call 911:  I acknowledge   Medication requests for narcotics will not be addressed via an E-Visit.  Please schedule an appointment. I acknowledge   Are you pregnant, could you be pregnant, or are you breast feeding? None of the above   Do you want to address a new or existing medication? I would like to address a medication I currently take   Would you like to change or continue your medication? Change medication   What medication would you like changed?  otc pain relief, unspecific Amphetamines   What is your current dose? unspecific   How often do you take your medication? More than 1 time weekly   Why do you need the medication changed? Side effects     [facility] P[facility] Evisit Medication Branch Side Effects    3/21/2024  1:56 PM CDT - Filed by Patient   List the side effects you had with the medication: seizure, allergic reaction, other   Have you stopped taking the medicine? Yes   Are you still having side effects? No    What medical condition is the  medication intended to treat? unspecific pain, menstrual cramps   Are you able to take your vital signs? No          Active Problem List with Overview Notes    Diagnosis Date Noted    Encounter for medical screening examination 03/13/2024    Agitation 03/11/2024    Altered mental status 03/11/2024    Posture imbalance 12/20/2023     Decreased strength of trunk and back 12/20/2023    Decreased ROM of neck 12/20/2023    Chronic bilateral thoracic back pain 08/01/2023    Arm weakness 08/01/2023    Chronic back pain     Gastroesophageal reflux disease without esophagitis 11/16/2019    Essential hypertension 11/16/2019    Bipolar 1 disorder, depressed 11/15/2019      Recent Labs Obtained:  No visits with results within 7 Day(s) from this visit.   Latest known visit with results is:   Admission on 03/11/2024, Discharged on 03/12/2024   Component Date Value Ref Range Status    QRS Duration 03/11/2024 86  ms Final    [facility] QTC Calculation 03/11/2024 566  ms Final    Specimen UA 03/11/2024 Urine, Clean Catch   Final    Color, UA 03/11/2024 Yellow  Yellow, Straw, Grace Final    Appearance, UA 03/11/2024 Clear  Clear Final    pH, UA 03/11/2024 5.0  5.0 - 8.0 Final    Specific Gravity, UA 03/11/2024 1.015  1.005 - 1.030 Final    Protein, UA 03/11/2024 Trace (A)  Negative Final    Comment: Recommend a 24 hour urine protein or a urine   protein/creatinine ratio if globulin induced proteinuria is  clinically suspected.      Glucose, UA 03/11/2024 Negative  Negative Final    Ketones, UA 03/11/2024 Trace (A)  Negative Final    Bilirubin (UA) 03/11/2024 Negative  Negative Final    Occult Blood UA 03/11/2024 Negative  Negative Final    Nitrite, UA 03/11/2024 Negative  Negative Final    Urobilinogen, UA 03/11/2024 Negative  <2.0 EU/dL Final    Leukocytes, UA 03/11/2024 Negative  Negative Final    Alcohol, Serum 03/11/2024 <10  <10 mg/dL Final    Acetaminophen (Tylenol), Serum 03/11/2024 <3.0 (L)  10.0 - 20.0 ug/mL Final    Comment: Toxic Levels:  Adults (4 hr post-ingestion).........>150 ug/mL  Adults (12 hr post-ingestion)........>40 ug/mL  Peds (2 hr post-ingestion, liquid)...>225 ug/mL      Sodium 03/11/2024 139  136 - 145 mmol/L Final    Potassium 03/11/2024 3.5  3.5 - 5.1 mmol/L Final    Chloride 03/11/2024 115 (H)  95 - 110 mmol/L Final    CO2 03/11/2024 9  (LL)  23 - 29 mmol/L Final    Comment: CO2 critical result(s) called and verbal readback obtained from   Pilar Lin @2:39PM; 3/11/2024 by CRISTOBAL 03/11/2024 14:39      Glucose 03/11/2024 90  70 - 110 mg/dL Final    BUN 03/11/2024 11  6 - 20 mg/dL Final    Creatinine 03/11/2024 0.8  0.5 - 1.4 mg/dL Final    Calcium 03/11/2024 8.4 (L)  8.7 - 10.5 mg/dL Final    Total Protein 03/11/2024 6.4  6.0 - 8.4 g/dL Final    Albumin 03/11/2024 3.7  3.5 - 5.2 g/dL Final    Total Bilirubin 03/11/2024 0.2  0.1 - 1.0 mg/dL Final    Comment: For infants and newborns, interpretation of results should be based  on gestational age, weight and in agreement with clinical  observations.    Premature Infant recommended reference ranges:  Up to 24 hours.............<8.0 mg/dL  Up to 48 hours............<12.0 mg/dL  3-5 days..................<15.0 mg/dL  6-29 days.................<15.0 mg/dL      Alkaline Phosphatase 03/11/2024 52 (L)  55 - 135 U/L Final    AST 03/11/2024 16  10 - 40 U/L Final    ALT 03/11/2024 13  10 - 44 U/L Final    eGFR 03/11/2024 >60  >60 mL/min/1.73 m^2 Final    Anion Gap 03/11/2024 15  8 - 16 mmol/L Final    TSH 03/11/2024 2.631  0.400 - 4.000 uIU/mL Final    WBC 03/11/2024 11.51  3.90 - 12.70 K/uL Final    RBC 03/11/2024 3.95 (L)  4.00 - 5.40 M/uL Final    Hemoglobin 03/11/2024 9.9 (L)  12.0 - 16.0 g/dL Final    Hematocrit 03/11/2024 32.5 (L)  37.0 - 48.5 % Final    MCV 03/11/2024 82  82 - 98 fL Final    MCH 03/11/2024 25.1 (L)  27.0 - 31.0 pg Final    MCHC 03/11/2024 30.5 (L)  32.0 - 36.0 g/dL Final    RDW 03/11/2024 16.2 (H)  11.5 - 14.5 % Final    Platelets 03/11/2024 SEE COMMENT  150 - 450 K/uL Final    Unable to report platelet count due to clumping.    MPV 03/11/2024 SEE COMMENT  9.2 - 12.9 fL Final    Result not available.    Immature Granulocytes 03/11/2024 0.3  0.0 - 0.5 % Final    Gran # (ANC) 03/11/2024 9.7 (H)  1.8 - 7.7 K/uL Final    Immature Grans (Abs) 03/11/2024 0.03  0.00 - 0.04 K/uL Final    Comment:  Mild elevation in immature granulocytes is non specific and   can be seen in a variety of conditions including stress response,   acute inflammation, trauma and pregnancy. Correlation with other   laboratory and clinical findings is essential.      Lymph # 03/11/2024 1.2  1.0 - 4.8 K/uL Final    Mono # 03/11/2024 0.6  0.3 - 1.0 K/uL Final    Eos # 03/11/2024 0.1  0.0 - 0.5 K/uL Final    Baso # 03/11/2024 0.04  0.00 - 0.20 K/uL Final    nRBC 03/11/2024 0  0 /100 WBC Final    Gran % 03/11/2024 84.1 (H)  38.0 - 73.0 % Final    Lymph % 03/11/2024 10.0 (L)  18.0 - 48.0 % Final    Mono % 03/11/2024 4.9  4.0 - 15.0 % Final    Eosinophil % 03/11/2024 0.4  0.0 - 8.0 % Final    Basophil % 03/11/2024 0.3  0.0 - 1.9 % Final    Platelet Estimate 03/11/2024 Appears normal   Final    Differential Method 03/11/2024 Automated   Final    CPK 03/11/2024 69  20 - 180 U/L Final    Magnesium 03/11/2024 2.2  1.6 - 2.6 mg/dL Final    Salicylate Lvl 03/11/2024 <5.0 (L)  15.0 - 30.0 mg/dL Final    Comment: Toxic:  30.0 - 70.0 mg/dl  Lethal: >70.0 mg/dl      POC Rapid COVID 03/11/2024 Negative  Negative Final     Acceptable 03/11/2024 Yes   Final    POC Preg Test, Ur 03/11/2024 Negative  Negative Final     Acceptable 03/11/2024 Yes   Final    BNP 03/11/2024 <10  0 - 99 pg/mL Final    Values of less than 100 pg/ml are consistent with non-CHF populations.    Troponin I 03/11/2024 <0.006  0.000 - 0.026 ng/mL Final    Comment: The reference interval for Troponin I represents the 99th percentile   cutoff   for our facility and is consistent with 3rd generation assay   performance.      Benzodiazepines 03/11/2024 Negative  Negative Final    Methadone metabolites 03/11/2024 Negative  Negative Final    Cocaine (Metab.) 03/11/2024 Negative  Negative Final    Opiate Scrn, Ur 03/11/2024 Negative  Negative Final    Barbiturate Screen, Ur 03/11/2024 Negative  Negative Final    Amphetamine Screen, Ur 03/11/2024 Presumptive  Positive (A)  Negative Final    THC 03/11/2024 Negative  Negative Final    Phencyclidine 03/11/2024 Negative  Negative Final    Creatinine, Urine 03/11/2024 50.9  15.0 - 325.0 mg/dL Final    Toxicology Information 03/11/2024 SEE COMMENT   Final    Comment: This screen includes the following classes of drugs at the listed   cut-off:    Benzodiazepines 200 ng/ml  Methadone 300 ng/ml  Cocaine metabolite 300 ng/ml  Opiates 300 ng/ml  Barbiturates 200 ng/ml  Amphetamines 1000 ng/ml  Marijuana metabs (THC) 50 ng/ml  Phencyclidine (PCP) 25 ng/ml    This is a screening test. If results do not correlate with clinical   presentation, then a confirmatory send out test is advised.     This report is intended for use in clinical monitoring and management   of   patients. It is not intended for use in employment related drug   testing.      Sodium 03/11/2024 140  136 - 145 mmol/L Final    Potassium 03/11/2024 3.4 (L)  3.5 - 5.1 mmol/L Final    Chloride 03/11/2024 120 (H)  95 - 110 mmol/L Final    CO2 03/11/2024 15 (L)  23 - 29 mmol/L Final    Glucose 03/11/2024 81  70 - 110 mg/dL Final    BUN 03/11/2024 8  6 - 20 mg/dL Final    Creatinine 03/11/2024 0.6  0.5 - 1.4 mg/dL Final    Calcium 03/11/2024 7.3 (L)  8.7 - 10.5 mg/dL Final    Total Protein 03/11/2024 4.8 (L)  6.0 - 8.4 g/dL Final    Albumin 03/11/2024 2.9 (L)  3.5 - 5.2 g/dL Final    Total Bilirubin 03/11/2024 0.2  0.1 - 1.0 mg/dL Final    Comment: For infants and newborns, interpretation of results should be based  on gestational age, weight and in agreement with clinical  observations.    Premature Infant recommended reference ranges:  Up to 24 hours.............<8.0 mg/dL  Up to 48 hours............<12.0 mg/dL  3-5 days..................<15.0 mg/dL  6-29 days.................<15.0 mg/dL      Alkaline Phosphatase 03/11/2024 41 (L)  55 - 135 U/L Final    AST 03/11/2024 16  10 - 40 U/L Final    ALT 03/11/2024 11  10 - 44 U/L Final    eGFR 03/11/2024 >60  >60 mL/min/1.73 m^2  Final    Anion Gap 03/11/2024 5 (L)  8 - 16 mmol/L Final    POC Glucose 03/11/2024 88  70 - 110 mg/dL Final    POC BUN 03/11/2024 4 (L)  6 - 30 mg/dL Final    POC Creatinine 03/11/2024 0.6  0.5 - 1.4 mg/dL Final    POC Sodium 03/11/2024 145  136 - 145 mmol/L Final    POC Potassium 03/11/2024 3.2 (L)  3.5 - 5.1 mmol/L Final    POC Chloride 03/11/2024 113 (H)  95 - 110 mmol/L Final    POC TCO2 (MEASURED) 03/11/2024 17 (L)  23 - 29 mmol/L Final    POC Anion Gap 03/11/2024 20 (H)  8 - 16 mmol/L Final    POC Ionized Calcium 03/11/2024 1.20  1.06 - 1.42 mmol/L Final    POC Hematocrit 03/11/2024 22 (L)  36 - 54 %PCV Final    Sample 03/11/2024 VENOUS   Final    Site 03/11/2024 Other   Final    Allens Test 03/11/2024 N/A   Final    DelSys 03/11/2024 Room Air   Final    Sodium 03/12/2024 139  136 - 145 mmol/L Final    Potassium 03/12/2024 3.5  3.5 - 5.1 mmol/L Final    Chloride 03/12/2024 121 (H)  95 - 110 mmol/L Final    CO2 03/12/2024 15 (L)  23 - 29 mmol/L Final    Glucose 03/12/2024 75  70 - 110 mg/dL Final    BUN 03/12/2024 6  6 - 20 mg/dL Final    Creatinine 03/12/2024 0.7  0.5 - 1.4 mg/dL Final    Calcium 03/12/2024 7.5 (L)  8.7 - 10.5 mg/dL Final    Total Protein 03/12/2024 4.7 (L)  6.0 - 8.4 g/dL Final    Albumin 03/12/2024 2.8 (L)  3.5 - 5.2 g/dL Final    Total Bilirubin 03/12/2024 0.3  0.1 - 1.0 mg/dL Final    Comment: For infants and newborns, interpretation of results should be based  on gestational age, weight and in agreement with clinical  observations.    Premature Infant recommended reference ranges:  Up to 24 hours.............<8.0 mg/dL  Up to 48 hours............<12.0 mg/dL  3-5 days..................<15.0 mg/dL  6-29 days.................<15.0 mg/dL      Alkaline Phosphatase 03/12/2024 39 (L)  55 - 135 U/L Final    AST 03/12/2024 16  10 - 40 U/L Final    ALT 03/12/2024 10  10 - 44 U/L Final    eGFR 03/12/2024 >60  >60 mL/min/1.73 m^2 Final    Anion Gap 03/12/2024 3 (L)  8 - 16 mmol/L Final        Encounter Diagnoses   Name Primary?    Bipolar 1 disorder, depressed Yes    Essential hypertension     Altered mental status, unspecified altered mental status type         No orders of the defined types were placed in this encounter.         Plan, continue symptomatic treatment and follow up with Psychiatry and Neurology.     E-Visit Time Tracking:

## 2024-03-22 ENCOUNTER — NURSE TRIAGE (OUTPATIENT)
Dept: ADMINISTRATIVE | Facility: CLINIC | Age: 34
End: 2024-03-22
Payer: MEDICARE

## 2024-03-23 ENCOUNTER — OFFICE VISIT (OUTPATIENT)
Dept: FAMILY MEDICINE | Facility: CLINIC | Age: 34
End: 2024-03-23
Payer: MEDICARE

## 2024-03-23 ENCOUNTER — HOSPITAL ENCOUNTER (EMERGENCY)
Facility: HOSPITAL | Age: 34
Discharge: PSYCHIATRIC OR SUBSTANCE USE FACILITY | End: 2024-03-24
Attending: EMERGENCY MEDICINE
Payer: MEDICARE

## 2024-03-23 DIAGNOSIS — F31.9 BIPOLAR 1 DISORDER, DEPRESSED: ICD-10-CM

## 2024-03-23 DIAGNOSIS — G47.00 INSOMNIA, UNSPECIFIED TYPE: Primary | ICD-10-CM

## 2024-03-23 DIAGNOSIS — M62.830 BACK MUSCLE SPASM: ICD-10-CM

## 2024-03-23 DIAGNOSIS — Z71.3 NUTRITIONAL COUNSELING: ICD-10-CM

## 2024-03-23 DIAGNOSIS — F29 PSYCHOSIS, UNSPECIFIED PSYCHOSIS TYPE: Primary | ICD-10-CM

## 2024-03-23 LAB
ALBUMIN SERPL BCP-MCNC: 3.9 G/DL (ref 3.5–5.2)
ALP SERPL-CCNC: 60 U/L (ref 55–135)
ALT SERPL W/O P-5'-P-CCNC: 24 U/L (ref 10–44)
AMPHET+METHAMPHET UR QL: NEGATIVE
ANION GAP SERPL CALC-SCNC: 12 MMOL/L (ref 8–16)
APAP SERPL-MCNC: <3 UG/ML (ref 10–20)
AST SERPL-CCNC: 21 U/L (ref 10–40)
B-HCG UR QL: NEGATIVE
BARBITURATES UR QL SCN>200 NG/ML: NEGATIVE
BASOPHILS # BLD AUTO: 0.06 K/UL (ref 0–0.2)
BASOPHILS NFR BLD: 1 % (ref 0–1.9)
BENZODIAZ UR QL SCN>200 NG/ML: NEGATIVE
BILIRUB SERPL-MCNC: 0.2 MG/DL (ref 0.1–1)
BILIRUB UR QL STRIP: NEGATIVE
BNP SERPL-MCNC: 16 PG/ML (ref 0–99)
BUN SERPL-MCNC: 6 MG/DL (ref 6–20)
BZE UR QL SCN: NEGATIVE
CALCIUM SERPL-MCNC: 8.5 MG/DL (ref 8.7–10.5)
CANNABINOIDS UR QL SCN: NEGATIVE
CHLORIDE SERPL-SCNC: 113 MMOL/L (ref 95–110)
CLARITY UR: CLEAR
CO2 SERPL-SCNC: 14 MMOL/L (ref 23–29)
COLOR UR: YELLOW
CREAT SERPL-MCNC: 0.8 MG/DL (ref 0.5–1.4)
CREAT UR-MCNC: 56.1 MG/DL (ref 15–325)
CTP QC/QA: YES
DIFFERENTIAL METHOD BLD: ABNORMAL
EOSINOPHIL # BLD AUTO: 0 K/UL (ref 0–0.5)
EOSINOPHIL NFR BLD: 0.7 % (ref 0–8)
ERYTHROCYTE [DISTWIDTH] IN BLOOD BY AUTOMATED COUNT: 17.9 % (ref 11.5–14.5)
EST. GFR  (NO RACE VARIABLE): >60 ML/MIN/1.73 M^2
ETHANOL SERPL-MCNC: <10 MG/DL
GLUCOSE SERPL-MCNC: 86 MG/DL (ref 70–110)
GLUCOSE UR QL STRIP: NEGATIVE
HCT VFR BLD AUTO: 32.1 % (ref 37–48.5)
HGB BLD-MCNC: 10.5 G/DL (ref 12–16)
HGB UR QL STRIP: ABNORMAL
HYALINE CASTS #/AREA URNS LPF: 1 /LPF
IMM GRANULOCYTES # BLD AUTO: 0.01 K/UL (ref 0–0.04)
IMM GRANULOCYTES NFR BLD AUTO: 0.2 % (ref 0–0.5)
KETONES UR QL STRIP: NEGATIVE
LEUKOCYTE ESTERASE UR QL STRIP: NEGATIVE
LYMPHOCYTES # BLD AUTO: 1.6 K/UL (ref 1–4.8)
LYMPHOCYTES NFR BLD: 27.5 % (ref 18–48)
MAGNESIUM SERPL-MCNC: 2 MG/DL (ref 1.6–2.6)
MCH RBC QN AUTO: 26.6 PG (ref 27–31)
MCHC RBC AUTO-ENTMCNC: 32.7 G/DL (ref 32–36)
MCV RBC AUTO: 81 FL (ref 82–98)
METHADONE UR QL SCN>300 NG/ML: NEGATIVE
MICROSCOPIC COMMENT: NORMAL
MONOCYTES # BLD AUTO: 0.7 K/UL (ref 0.3–1)
MONOCYTES NFR BLD: 11.5 % (ref 4–15)
NEUTROPHILS # BLD AUTO: 3.5 K/UL (ref 1.8–7.7)
NEUTROPHILS NFR BLD: 59.1 % (ref 38–73)
NITRITE UR QL STRIP: NEGATIVE
NRBC BLD-RTO: 0 /100 WBC
OPIATES UR QL SCN: NEGATIVE
PCP UR QL SCN>25 NG/ML: NEGATIVE
PH UR STRIP: 7 [PH] (ref 5–8)
PLATELET # BLD AUTO: 304 K/UL (ref 150–450)
PMV BLD AUTO: 8.5 FL (ref 9.2–12.9)
POTASSIUM SERPL-SCNC: 2.9 MMOL/L (ref 3.5–5.1)
PROT SERPL-MCNC: 6.7 G/DL (ref 6–8.4)
PROT UR QL STRIP: ABNORMAL
RBC # BLD AUTO: 3.95 M/UL (ref 4–5.4)
RBC #/AREA URNS HPF: 0 /HPF (ref 0–4)
SODIUM SERPL-SCNC: 139 MMOL/L (ref 136–145)
SP GR UR STRIP: 1.01 (ref 1–1.03)
SQUAMOUS #/AREA URNS HPF: 2 /HPF
TOXICOLOGY INFORMATION: NORMAL
TROPONIN I SERPL DL<=0.01 NG/ML-MCNC: <0.006 NG/ML (ref 0–0.03)
TSH SERPL DL<=0.005 MIU/L-ACNC: 2.06 UIU/ML (ref 0.4–4)
URN SPEC COLLECT METH UR: ABNORMAL
UROBILINOGEN UR STRIP-ACNC: NEGATIVE EU/DL
WBC # BLD AUTO: 5.9 K/UL (ref 3.9–12.7)

## 2024-03-23 PROCEDURE — 83735 ASSAY OF MAGNESIUM: CPT | Performed by: EMERGENCY MEDICINE

## 2024-03-23 PROCEDURE — 80307 DRUG TEST PRSMV CHEM ANLYZR: CPT | Performed by: EMERGENCY MEDICINE

## 2024-03-23 PROCEDURE — 84484 ASSAY OF TROPONIN QUANT: CPT | Performed by: EMERGENCY MEDICINE

## 2024-03-23 PROCEDURE — 82077 ASSAY SPEC XCP UR&BREATH IA: CPT | Performed by: EMERGENCY MEDICINE

## 2024-03-23 PROCEDURE — 80143 DRUG ASSAY ACETAMINOPHEN: CPT | Performed by: EMERGENCY MEDICINE

## 2024-03-23 PROCEDURE — 84443 ASSAY THYROID STIM HORMONE: CPT | Performed by: EMERGENCY MEDICINE

## 2024-03-23 PROCEDURE — 96374 THER/PROPH/DIAG INJ IV PUSH: CPT

## 2024-03-23 PROCEDURE — 80053 COMPREHEN METABOLIC PANEL: CPT | Performed by: EMERGENCY MEDICINE

## 2024-03-23 PROCEDURE — 96375 TX/PRO/DX INJ NEW DRUG ADDON: CPT

## 2024-03-23 PROCEDURE — 85025 COMPLETE CBC W/AUTO DIFF WBC: CPT | Performed by: EMERGENCY MEDICINE

## 2024-03-23 PROCEDURE — 81000 URINALYSIS NONAUTO W/SCOPE: CPT | Mod: 59 | Performed by: EMERGENCY MEDICINE

## 2024-03-23 PROCEDURE — 99285 EMERGENCY DEPT VISIT HI MDM: CPT | Mod: 25

## 2024-03-23 PROCEDURE — 63600175 PHARM REV CODE 636 W HCPCS: Performed by: EMERGENCY MEDICINE

## 2024-03-23 PROCEDURE — 99213 OFFICE O/P EST LOW 20 MIN: CPT | Mod: 95,,, | Performed by: STUDENT IN AN ORGANIZED HEALTH CARE EDUCATION/TRAINING PROGRAM

## 2024-03-23 PROCEDURE — 96372 THER/PROPH/DIAG INJ SC/IM: CPT | Mod: 59 | Performed by: EMERGENCY MEDICINE

## 2024-03-23 PROCEDURE — 83880 ASSAY OF NATRIURETIC PEPTIDE: CPT | Performed by: EMERGENCY MEDICINE

## 2024-03-23 PROCEDURE — 81025 URINE PREGNANCY TEST: CPT | Performed by: EMERGENCY MEDICINE

## 2024-03-23 PROCEDURE — 25000003 PHARM REV CODE 250: Performed by: EMERGENCY MEDICINE

## 2024-03-23 RX ORDER — DIAZEPAM 10 MG/2ML
5 INJECTION INTRAMUSCULAR
Status: COMPLETED | OUTPATIENT
Start: 2024-03-23 | End: 2024-03-23

## 2024-03-23 RX ORDER — LORAZEPAM 2 MG/ML
2 INJECTION INTRAMUSCULAR
Status: COMPLETED | OUTPATIENT
Start: 2024-03-23 | End: 2024-03-23

## 2024-03-23 RX ORDER — HALOPERIDOL 5 MG/ML
5 INJECTION INTRAMUSCULAR
Status: COMPLETED | OUTPATIENT
Start: 2024-03-23 | End: 2024-03-23

## 2024-03-23 RX ORDER — DIPHENHYDRAMINE HYDROCHLORIDE 50 MG/ML
50 INJECTION INTRAMUSCULAR; INTRAVENOUS
Status: COMPLETED | OUTPATIENT
Start: 2024-03-23 | End: 2024-03-23

## 2024-03-23 RX ADMIN — HALOPERIDOL LACTATE 5 MG: 5 INJECTION, SOLUTION INTRAMUSCULAR at 09:03

## 2024-03-23 RX ADMIN — POTASSIUM BICARBONATE 60 MEQ: 391 TABLET, EFFERVESCENT ORAL at 09:03

## 2024-03-23 RX ADMIN — DIPHENHYDRAMINE HYDROCHLORIDE 50 MG: 50 INJECTION, SOLUTION INTRAMUSCULAR; INTRAVENOUS at 09:03

## 2024-03-23 RX ADMIN — LORAZEPAM 2 MG: 2 INJECTION INTRAMUSCULAR; INTRAVENOUS at 09:03

## 2024-03-23 RX ADMIN — DIAZEPAM 5 MG: 5 INJECTION, SOLUTION INTRAMUSCULAR; INTRAVENOUS at 07:03

## 2024-03-23 NOTE — PATIENT INSTRUCTIONS
I recommend you try voltaren gel (diclofenac) for the muscle twitching up to 3 times a day.    Tylenol is okay to take.    You can try magnesium or melatonin for sleep. I recommend taking this 2 hours before bed. You can also download the Calm lu with free sleep meditations and stories.     You can try lavender and meditation exercises for anxiety.

## 2024-03-23 NOTE — PROGRESS NOTES
Telehealth Visit  [facility]      The patient location is: in [place]   The chief complaint leading to consultation is: insomnia, ED f/u  Visit type: audiovisual    Face to Face time with patient: 10 minutes      HPI: Patsy Akins is a 34 y.o. year old female who presents for ED f/u    Pt had partial seizure after taking claritin earlier this week. Presented to the ED, was amphetamine positive, denied amphetamine use. Has not been eating and sleeping enough and believes she got delirious and had a panic attack that caused the symptoms. Currently getting sleepy again but unable to fall asleep. Trying to improve nutrition.     Muscle twitching in back, shoulder and neck. When she goes to sleep will twitch. Not painful.     Overall pt requests advise on OTC medications safe to take for anxiety and sleep and tips on nutrition.      Physical Exam:   Gen- NAD, appears stated age, thin  Resp- Breathing comfortably on RA  Neuro- Alert, spontaneous movements  Psych- Calm, cooperative, slightly disordered thought process     Assessment and plan:      1. Insomnia, unspecified type  Recommend magnesium or melatonin. Can also trial meditation and sleep stories.     2. Bipolar 1 disorder, depressed  Stressed importance of following up with psychiatrist. Can trial lavender and meditation for anxiety until able to be seen.     3. Nutritional counseling  Basic nutritional counseling provided with healthy diet and need to avoid stimulants and excess sugar. Pt verbalized understanding and was in agreement with the plan.      4. Back muscle spasm  Can trial OTC voltaren gel. Medication, side effects and proper use discussed. Pt verbalized understanding and was in agreement with the plan.        Follow-up: with psychiatry next week     20 minutes of total time spent on the encounter, which includes face to face time and non-face to face time preparing to see the patient (eg, review of tests), Obtaining and/or  reviewing separately obtained history, Documenting clinical information in the electronic or other health record, Independently interpreting results (not separately reported) and communicating results to the patient/family/caregiver, or Care coordination (not separately reported).        This service was not originating from a related E/M service provided within the previous 7 days nor will  to an E/M service or procedure within the next 24 hours or my soonest available appointment.  Prevailing standard of care was able to be met in this audio-only visit.      Gomez Brandt, DO  Family Medicine

## 2024-03-23 NOTE — TELEPHONE ENCOUNTER
Pt reports has been having anxiety/insomnia issues, not slept well for a couple of days, Pt advised to see a MD within 3 days per protocol via PCP office/UC/ED/ODVV, pt states it is hard for her to get transportation so would like to schedule a virtual visit with her PCP or with someone within that group (Internal Med). Was able to schedule a virtual appointment for tomorrow. Pt also given anxiety/mental health hotline numbers for [place]. Pt encouraged to call back with any worsening symptoms or questions. She verbalized understanding.    Reason for Disposition   Insomnia interferes with work or school    Additional Information   Negative: [1] Insomnia persists > 1 week AND [2] no improvement after using Care Advice    Protocols used: Insomnia-A-AH

## 2024-03-24 VITALS
TEMPERATURE: 99 F | BODY MASS INDEX: 17.72 KG/M2 | HEART RATE: 100 BPM | RESPIRATION RATE: 20 BRPM | DIASTOLIC BLOOD PRESSURE: 87 MMHG | SYSTOLIC BLOOD PRESSURE: 138 MMHG | HEIGHT: 63 IN | WEIGHT: 100 LBS | OXYGEN SATURATION: 100 %

## 2024-03-24 NOTE — ED NOTES
"Pt jumped up yelling stating" I am not real this isn't real" Reassured pt that she is in ER and safe. Pt anxious and states " I am scare this isn't real". Reassured pt multiple times. Dr Park  notified and aware.   "

## 2024-03-24 NOTE — ED NOTES
Pt more calm and not anxious appearing . Pt resting quietly with eyes closed easily arousable to voice. 1:1 sitter at bedside.

## 2024-03-24 NOTE — ED NOTES
Attempted 2x to inform pts brother Isra of pts future transfer to [place]. No answer and no voice mail set up.

## 2024-03-24 NOTE — ED NOTES
Bed: 12main  Expected date: 3/23/24  Expected time: 12:16 AM  Means of arrival:   Comments:  Psych

## 2024-03-28 LAB
OHS QRS DURATION: 82 MS
OHS QTC CALCULATION: 465 MS

## 2024-04-05 DIAGNOSIS — G43.909 MIGRAINE WITHOUT STATUS MIGRAINOSUS, NOT INTRACTABLE, UNSPECIFIED MIGRAINE TYPE: ICD-10-CM

## 2024-04-05 RX ORDER — TOPIRAMATE 200 MG/1
200 TABLET ORAL NIGHTLY
Qty: 30 TABLET | Refills: 10 | Status: SHIPPED | OUTPATIENT
Start: 2024-04-05

## 2024-05-05 ENCOUNTER — NURSE TRIAGE (OUTPATIENT)
Dept: ADMINISTRATIVE | Facility: CLINIC | Age: 34
End: 2024-05-05
Payer: MEDICARE

## 2024-05-05 ENCOUNTER — HOSPITAL ENCOUNTER (EMERGENCY)
Facility: HOSPITAL | Age: 34
Discharge: HOME | End: 2024-05-05
Attending: EMERGENCY MEDICINE
Payer: MEDICARE

## 2024-05-05 VITALS
HEART RATE: 112 BPM | RESPIRATION RATE: 18 BRPM | TEMPERATURE: 98 F | SYSTOLIC BLOOD PRESSURE: 103 MMHG | WEIGHT: 100 LBS | HEIGHT: 63 IN | DIASTOLIC BLOOD PRESSURE: 61 MMHG | BODY MASS INDEX: 17.72 KG/M2 | OXYGEN SATURATION: 99 %

## 2024-05-05 DIAGNOSIS — R25.1 TREMOR: Primary | ICD-10-CM

## 2024-05-05 DIAGNOSIS — F41.9 ANXIETY: ICD-10-CM

## 2024-05-05 LAB
B-HCG UR QL: NEGATIVE
BUN SERPL-MCNC: 12 MG/DL (ref 6–30)
CHLORIDE SERPL-SCNC: 107 MMOL/L (ref 95–110)
CREAT SERPL-MCNC: 0.5 MG/DL (ref 0.5–1.4)
CTP QC/QA: YES
GLUCOSE SERPL-MCNC: 112 MG/DL (ref 70–110)
HCT VFR BLD CALC: 32 %PCV (ref 36–54)
POC IONIZED CALCIUM: 1.19 MMOL/L (ref 1.06–1.42)
POC TCO2 (MEASURED): 19 MMOL/L (ref 23–29)
POTASSIUM BLD-SCNC: 3.8 MMOL/L (ref 3.5–5.1)
SAMPLE: ABNORMAL
SODIUM BLD-SCNC: 138 MMOL/L (ref 136–145)

## 2024-05-05 PROCEDURE — 80048 BASIC METABOLIC PNL TOTAL CA: CPT

## 2024-05-05 PROCEDURE — 81025 URINE PREGNANCY TEST: CPT

## 2024-05-05 PROCEDURE — 25000003 PHARM REV CODE 250

## 2024-05-05 PROCEDURE — 99283 EMERGENCY DEPT VISIT LOW MDM: CPT

## 2024-05-05 RX ORDER — LORAZEPAM 1 MG/1
1 TABLET ORAL
Status: COMPLETED | OUTPATIENT
Start: 2024-05-05 | End: 2024-05-05

## 2024-05-05 RX ADMIN — LORAZEPAM 1 MG: 1 TABLET ORAL at 06:05

## 2024-05-05 NOTE — ED NOTES
I-STAT Chem-8+ Results:   Value Reference Range   Sodium 138 136-145 mmol/L   Potassium  3.8 3.5-5.1 mmol/L   Chloride 107  mmol/L   Ionized Calcium 1.19 1.06-1.42 mmol/L   CO2 (measured) 19 23-29 mmol/L   Glucose 112  mg/dL   BUN 12 6-30 mg/dL   Creatinine 0.5 0.5-1.4 mg/dL   Hematocrit 32 36-54%

## 2024-05-05 NOTE — DISCHARGE INSTRUCTIONS
If you continued to have trouble sleeping, please use over-the-counter melatonin/Benadryl as needed.  I highly recommend that you follow up with your psychiatrist in the coming weeks if you continue to have symptoms.

## 2024-05-05 NOTE — ED PROVIDER NOTES
Encounter Date: 5/5/2024       History     Chief Complaint   Patient presents with    Tremors     Pt reports recently placed on Abilify and c/o shaking and restlesness. Reports requesting help for medication adjustment, feeling manic, denies SI/HI/Hallucinations.      HPI  Review of patient's allergies indicates:   Allergen Reactions    Lamotrigine Hives and Itching     Past Medical History:   Diagnosis Date    Bipolar affective     Bipolar disorder with depression     Anxiety disorder    Chronic back pain     Hypokalemia     Irritable bowel syndrome      No past surgical history on file.  Family History   Problem Relation Name Age of Onset    Mental illness Mother Zeynep     Alcohol abuse Mother Zeynep     Depression Mother Zeynep     Alcohol abuse Father Jay     Depression Father Jay     Alcohol abuse Maternal Aunt Marjorie     Alcohol abuse Maternal Grandmother Joann     Arthritis Maternal Grandmother [place]     Stroke Maternal Grandmother Joann     Asthma Brother Powderly     Depression Brother Powderly     Learning disabilities Brother Luigi     Mental illness Brother Luigi     Depression Paternal Grandmother Darlene     Diabetes Paternal Grandmother Darlene     Stroke Paternal Grandmother Darlene     Mental illness Paternal Aunt Hilda     Miscarriages / Stillbirths Paternal Aunt Yvrose     Stroke Paternal Grandfather Rony      Social History     Tobacco Use    Smoking status: Never    Smokeless tobacco: Never    Tobacco comments:     Don't like it.   Substance Use Topics    Alcohol use: Not Currently     Alcohol/week: 0.0 standard drinks of alcohol     Comment: Don't drink.    Drug use: No     Review of Systems    Physical Exam     Initial Vitals   BP Pulse Resp Temp SpO2   05/05/24 0551 05/05/24 0551 05/05/24 0551 05/05/24 0552 05/05/24 0551   110/80 98 18 98.3 °F (36.8 °C) 100 %      MAP       --                Physical Exam    ED Course   Procedures  Labs Reviewed   POCT URINE PREGNANCY   ISTAT CHEM8           Imaging Results    None          Medications   LORazepam tablet 1 mg (1 mg Oral Given 5/5/24 0612)     Medical Decision Making  Amount and/or Complexity of Data Reviewed  Labs: ordered. Decision-making details documented in ED Course.    Risk  Prescription drug management.              Attending Attestation:             Attending ED Notes:   Attending Note:  I have seen the patient, have repeated the key portions of the history and physical, reviewed and agree with the medical documentation, and supervised and managed the medical care of the patient. Additionally, I was present for the critical portion of any procedure(s) performed.    34 F recent d/c from IP psych here for tremor.  Started on Abilify several weeks ago.  Was on Remeron but ran out of meds yesterday.  Feels restless. Meds refilled, will get delivered on Monday. Denies SI/HI    Plan for istat, poc pregnancy, ativan po  I anticipate discharge    MICHAEL Whitlock MD  Staff ED Physician  05/05/2024 7:08 AM            ED Course as of 05/05/24 0704   Sun May 05, 2024   0703 hCG Qualitative, Urine: Negative [GM]      ED Course User Index  [GM] Kadie Whitlock MD                           Clinical Impression:                    Kadie Whitlock MD  05/06/24 0527

## 2024-05-05 NOTE — TELEPHONE ENCOUNTER
Caller states she was recently discharged from hospital with a diagnosis of lenin. Caller states she she is out of one of her medication that assist her with sleeping and she doesn't want to have a manic attack. Medication is prescribed by non [facility] provider. Caller advised to be seen and evaluated and possibly obtain a short script for medication in ED.  Pt advised per protocol and verbalized understanding.   Reason for Disposition   Caller has medicine question, adult has minor symptoms, caller declines triage, AND triager answers question    Additional Information   Negative: [1] Intentional drug overdose AND [2] suicidal thoughts or ideas   Negative: MORE THAN A DOUBLE DOSE of a prescription or over-the-counter (OTC) drug   Negative: [1] DOUBLE DOSE (an extra dose or lesser amount) of prescription drug AND [2] any symptoms (e.g., dizziness, nausea, pain, sleepiness)   Negative: [1] DOUBLE DOSE (an extra dose or lesser amount) of over-the-counter (OTC) drug AND [2] any symptoms (e.g., dizziness, nausea, pain, sleepiness)   Negative: Took another person's prescription drug   Negative: [1] DOUBLE DOSE (an extra dose or lesser amount) of prescription drug AND [2] NO symptoms  (Exception: A double dose of antibiotics.)   Negative: Diabetes drug error or overdose (e.g., took wrong type of insulin or took extra dose)   Negative: [1] Prescription not at pharmacy AND [2] was prescribed by PCP recently (Exception: Triager has access to EMR and prescription is recorded there. Go to Home Care and confirm for pharmacy.)   Negative: [1] Pharmacy calling with prescription question AND [2] triager unable to answer question   Negative: [1] Caller has URGENT medicine question about med that PCP or specialist prescribed AND [2] triager unable to answer question   Negative: Medicine patch causing local rash or itching   Negative: [1] Caller has medicine question about med NOT prescribed by PCP AND [2] triager unable to answer  question (e.g., compatibility with other med, storage)   Negative: Prescription request for new medicine (not a refill)   Negative: [1] Caller has NON-URGENT medicine question about med that PCP prescribed AND [2] triager unable to answer question   Negative: Caller wants to use a complementary or alternative medicine   Negative: [1] Prescription prescribed recently is not at pharmacy AND [2] triager has access to patient's EMR AND [3] prescription is recorded in the EMR   Negative: [1] DOUBLE DOSE (an extra dose or lesser amount) of over-the-counter (OTC) drug AND [2] NO symptoms   Negative: [1] DOUBLE DOSE (an extra dose or lesser amount) of antibiotic drug AND [2] NO symptoms   Negative: Caller has medicine question only, adult not sick, AND triager answers question    Protocols used: Medication Question Call-A-

## 2024-05-05 NOTE — ED PROVIDER NOTES
Encounter Date: 5/5/2024       History     Chief Complaint   Patient presents with    Tremors     Pt reports recently placed on Abilify and c/o shaking and restlesness. Reports requesting help for medication adjustment, feeling manic, denies SI/HI/Hallucinations.      HPI  Ms. Akins is a 34-year-old female with a past medical history of bipolar recently started on Abilify earlier this week who presents due to tremors/anxiety. She states that she ran out of her sleep medication and has been up all night.  She notes that she is concerned that she is going to enter a manic episode and does not want to go back to a psychiatric facility.  She feels that since she started taking the Abilify earlier this week she has been more nervous/had these tremors more frequently and is not particularly happy with the medication.  She notes that she wants to follow up with a psychiatrist, but because it is the weekend that she has not been able to.  Denying any suicidal/homicidal ideation as well as any auditory/visual hallucinations.  Denies all other symptoms at this time.    Review of patient's allergies indicates:   Allergen Reactions    Lamotrigine Hives and Itching     Past Medical History:   Diagnosis Date    Bipolar affective     Bipolar disorder with depression     Anxiety disorder    Chronic back pain     Hypokalemia     Irritable bowel syndrome      No past surgical history on file.  Family History   Problem Relation Name Age of Onset    Mental illness Mother Zeynep     Alcohol abuse Mother Zeynep     Depression Mother Zeynep     Alcohol abuse Father Jay     Depression Father Jay     Alcohol abuse Maternal Aunt Reynaldosachi     Alcohol abuse Maternal Grandmother [place]     Arthritis Maternal Grandmother [place]     Stroke Maternal Grandmother Joann     Asthma Brother Luigi     Depression Brother [place]     Learning disabilities Brother [place]     Mental illness Brother Luigi     Depression Paternal Grandmother Darlene      Diabetes Paternal Grandmother Darlene     Stroke Paternal Grandmother Darlene     Mental illness Paternal Aunt Hilda     Miscarriages / Stillbirths Paternal Aunt Yvrose     Stroke Paternal Grandfather Rony      Social History     Tobacco Use    Smoking status: Never    Smokeless tobacco: Never    Tobacco comments:     Don't like it.   Substance Use Topics    Alcohol use: Not Currently     Alcohol/week: 0.0 standard drinks of alcohol     Comment: Don't drink.    Drug use: No       Physical Exam     Initial Vitals   BP Pulse Resp Temp SpO2   05/05/24 0551 05/05/24 0551 05/05/24 0551 05/05/24 0552 05/05/24 0551   110/80 98 18 98.3 °F (36.8 °C) 100 %      MAP       --                Physical Exam    Nursing note and vitals reviewed.  Constitutional: She appears well-developed. No distress.   Patient appears anxious/has some tremors   HENT:   Head: Normocephalic and atraumatic.   Mouth/Throat: Oropharynx is clear and moist and mucous membranes are normal.   Eyes: EOM are normal. Pupils are equal, round, and reactive to light.   Neck:   Normal range of motion.  Cardiovascular:  Normal rate and regular rhythm.           Tachycardic   Pulmonary/Chest: Breath sounds normal. No respiratory distress. She has no wheezes. She has no rhonchi. She has no rales.   Abdominal: Abdomen is soft. She exhibits no distension. There is no abdominal tenderness. There is no rebound and no guarding.   Musculoskeletal:         General: No edema.      Cervical back: Normal range of motion.     Neurological: She is alert and oriented to person, place, and time.   Skin: Skin is warm.   Psychiatric:   Patient appears anxious and has somewhat pressured speech but has overall normal thought content/behavior         ED Course   Procedures  Labs Reviewed   ISTAT PROCEDURE - Abnormal; Notable for the following components:       Result Value    POC Glucose 112 (*)     POC TCO2 (MEASURED) 19 (*)     POC Hematocrit 32 (*)     All other components within  normal limits   POCT URINE PREGNANCY   ISTAT CHEM8          Imaging Results    None          Medications   LORazepam tablet 1 mg (1 mg Oral Given 5/5/24 4918)     Medical Decision Making  Ms. Akins is a 34-year-old female who presents due to anxiety/tremors.  Differential diagnosis includes but is not limited to anxiety, panic attack, bipolar progression, medication side effect, pregnancy.  Upon my initial evaluation of the patient, she was noted to be mildly tachycardic but had overall other reassuring vital signs as well as reassuring physical exam.  I will get a hamate to make sure that there was no electrolyte abnormality and make sure that she has not pregnant, however at this time I feel that there is no indication for further medical workup.  I will give her a dose of lorazepam to treat her anxiety.    Upon my initial re-evaluation of the patient after consuming the lorazepam, she stated that she was feeling better.  Her tachycardia also resolved.  The Chem 8/pregnancy tests were both shown to be unremarkable.  I had a discussion with the patient about following up with her psychiatrist to which she was agreeable to.  She remained hemodynamically stable while here in the emergency department, at this time I feel she is safe for discharge.    Amount and/or Complexity of Data Reviewed  Labs: ordered. Decision-making details documented in ED Course.    Risk  Prescription drug management.              Attending Attestation:             Attending ED Notes:   Attending Note:  I have seen the patient, have repeated the key portions of the history and physical, reviewed and agree with the medical documentation, and supervised and managed the medical care of the patient. Additionally, I was present for the critical portion of any procedure(s) performed.    34 F hx above here for tremor, difficulty sleeping in the setting running out of her Remeron.  Vital signs stable.  Overall well-appearing, does not appear  distress.  Denies SI, HI, AVH.    Given Ativan 1 mg p.o..  Reports improvement of symptoms.  Has prescription that it has been filled, will be delivered tomorrow.      Return precautions given      ED Course as of 05/05/24 0742   Sun May 05, 2024   0703 hCG Qualitative, Urine: Negative [GM]      ED Course User Index  [GM] Kadie Whitlock MD                           Clinical Impression:  Final diagnoses:  [R25.1] Tremor (Primary)  [F41.9] Anxiety          ED Disposition Condition    Discharge Stable          ED Prescriptions    None       Follow-up Information       Follow up With Specialties Details Why Contact Info Additional Information    [facility] In 3 days  [address]  [facility] [address] [phone] [facility] 4th Floor Please park in [facility] and use [facility] Medical Office elevator             Steve Donaldson MD  Resident  05/05/24 0742       Kadie Whitlock MD  05/05/24 1013       Kadie Whitlock MD  05/06/24 0492

## 2024-05-05 NOTE — ED TRIAGE NOTES
"Pt. to ED via EMS for c/o of increasing feelings of lenin since 2 am and wanting to "stop the manic phase before I need to be admitted again." Pt. Reports recently prescribed Abilify and that it "makes her heart race." Pt. states that she "just wants to sleep" and is requesting a medication change.  Pt. Calm, denies SI/HI/hallucinations.  "

## 2024-05-07 ENCOUNTER — PATIENT OUTREACH (OUTPATIENT)
Dept: EMERGENCY MEDICINE | Facility: HOSPITAL | Age: 34
End: 2024-05-07
Payer: MEDICARE

## 2024-05-07 NOTE — PROGRESS NOTES
ED Navigator phoned patient for follow-up. Patient had a recent ED visit. Patient unavailable. Follow-up scheduled.   Kay Wadsworth

## 2024-05-10 NOTE — TELEPHONE ENCOUNTER
----- Message from Beatriz Merritt sent at 8/11/2022 11:12 AM CDT -----  Contact: Patient  Type:  Sooner Appointment Request      Name of Caller:Patient  When is the first available appointment?11/07/2022  Symptoms:ER follow up  Would the patient rather a call back or a response via [facility]? Call back   Best Call Back Number:[phone]  Additional Information:Please assist          Vaccine status unknown

## 2024-05-27 ENCOUNTER — TELEPHONE (OUTPATIENT)
Dept: ADMINISTRATIVE | Facility: OTHER | Age: 34
End: 2024-05-27
Payer: MEDICARE

## 2024-05-27 NOTE — TELEPHONE ENCOUNTER
JUDI with scheduled Neurology appointment of 6-27-24, and contact number provided for any questions.

## 2024-06-10 ENCOUNTER — PATIENT MESSAGE (OUTPATIENT)
Dept: INTERNAL MEDICINE | Facility: CLINIC | Age: 34
End: 2024-06-10
Payer: MEDICARE

## 2024-06-12 ENCOUNTER — OFFICE VISIT (OUTPATIENT)
Dept: INTERNAL MEDICINE | Facility: CLINIC | Age: 34
End: 2024-06-12
Payer: MEDICARE

## 2024-06-12 ENCOUNTER — LAB VISIT (OUTPATIENT)
Dept: LAB | Facility: HOSPITAL | Age: 34
End: 2024-06-12
Attending: INTERNAL MEDICINE
Payer: MEDICARE

## 2024-06-12 VITALS
WEIGHT: 129 LBS | HEIGHT: 63 IN | SYSTOLIC BLOOD PRESSURE: 94 MMHG | DIASTOLIC BLOOD PRESSURE: 60 MMHG | OXYGEN SATURATION: 99 % | BODY MASS INDEX: 22.86 KG/M2 | HEART RATE: 86 BPM

## 2024-06-12 DIAGNOSIS — M54.9 CHRONIC BACK PAIN, UNSPECIFIED BACK LOCATION, UNSPECIFIED BACK PAIN LATERALITY: ICD-10-CM

## 2024-06-12 DIAGNOSIS — F31.9 BIPOLAR 1 DISORDER, DEPRESSED: ICD-10-CM

## 2024-06-12 DIAGNOSIS — G89.29 CHRONIC BACK PAIN, UNSPECIFIED BACK LOCATION, UNSPECIFIED BACK PAIN LATERALITY: ICD-10-CM

## 2024-06-12 DIAGNOSIS — I10 ESSENTIAL HYPERTENSION: Primary | ICD-10-CM

## 2024-06-12 DIAGNOSIS — D64.9 ANEMIA, UNSPECIFIED TYPE: ICD-10-CM

## 2024-06-12 DIAGNOSIS — R73.09 ELEVATED GLUCOSE LEVEL: ICD-10-CM

## 2024-06-12 DIAGNOSIS — Z00.00 ROUTINE PHYSICAL EXAMINATION: ICD-10-CM

## 2024-06-12 DIAGNOSIS — I10 ESSENTIAL HYPERTENSION: ICD-10-CM

## 2024-06-12 LAB
ALBUMIN SERPL BCP-MCNC: 3.6 G/DL (ref 3.5–5.2)
ALP SERPL-CCNC: 90 U/L (ref 55–135)
ALT SERPL W/O P-5'-P-CCNC: 39 U/L (ref 10–44)
ANION GAP SERPL CALC-SCNC: 7 MMOL/L (ref 8–16)
AST SERPL-CCNC: 23 U/L (ref 10–40)
BASOPHILS # BLD AUTO: 0.05 K/UL (ref 0–0.2)
BASOPHILS NFR BLD: 1.1 % (ref 0–1.9)
BILIRUB SERPL-MCNC: 0.1 MG/DL (ref 0.1–1)
BUN SERPL-MCNC: 15 MG/DL (ref 6–20)
CALCIUM SERPL-MCNC: 8.9 MG/DL (ref 8.7–10.5)
CHLORIDE SERPL-SCNC: 112 MMOL/L (ref 95–110)
CHOLEST SERPL-MCNC: 200 MG/DL (ref 120–199)
CHOLEST/HDLC SERPL: 3.1 {RATIO} (ref 2–5)
CO2 SERPL-SCNC: 21 MMOL/L (ref 23–29)
CREAT SERPL-MCNC: 0.7 MG/DL (ref 0.5–1.4)
DIFFERENTIAL METHOD BLD: ABNORMAL
EOSINOPHIL # BLD AUTO: 0.1 K/UL (ref 0–0.5)
EOSINOPHIL NFR BLD: 2.9 % (ref 0–8)
ERYTHROCYTE [DISTWIDTH] IN BLOOD BY AUTOMATED COUNT: 16.9 % (ref 11.5–14.5)
EST. GFR  (NO RACE VARIABLE): >60 ML/MIN/1.73 M^2
ESTIMATED AVG GLUCOSE: 103 MG/DL (ref 68–131)
GLUCOSE SERPL-MCNC: 99 MG/DL (ref 70–110)
HBA1C MFR BLD: 5.2 % (ref 4–5.6)
HCT VFR BLD AUTO: 31.2 % (ref 37–48.5)
HDLC SERPL-MCNC: 64 MG/DL (ref 40–75)
HDLC SERPL: 32 % (ref 20–50)
HGB BLD-MCNC: 9.6 G/DL (ref 12–16)
IMM GRANULOCYTES # BLD AUTO: 0.01 K/UL (ref 0–0.04)
IMM GRANULOCYTES NFR BLD AUTO: 0.2 % (ref 0–0.5)
LDLC SERPL CALC-MCNC: 112.4 MG/DL (ref 63–159)
LYMPHOCYTES # BLD AUTO: 1.4 K/UL (ref 1–4.8)
LYMPHOCYTES NFR BLD: 29.8 % (ref 18–48)
MCH RBC QN AUTO: 24.7 PG (ref 27–31)
MCHC RBC AUTO-ENTMCNC: 30.8 G/DL (ref 32–36)
MCV RBC AUTO: 80 FL (ref 82–98)
MONOCYTES # BLD AUTO: 0.5 K/UL (ref 0.3–1)
MONOCYTES NFR BLD: 10.1 % (ref 4–15)
NEUTROPHILS # BLD AUTO: 2.7 K/UL (ref 1.8–7.7)
NEUTROPHILS NFR BLD: 55.9 % (ref 38–73)
NONHDLC SERPL-MCNC: 136 MG/DL
NRBC BLD-RTO: 0 /100 WBC
PLATELET # BLD AUTO: 234 K/UL (ref 150–450)
PMV BLD AUTO: 8.6 FL (ref 9.2–12.9)
POTASSIUM SERPL-SCNC: 3.9 MMOL/L (ref 3.5–5.1)
PROT SERPL-MCNC: 6.6 G/DL (ref 6–8.4)
RBC # BLD AUTO: 3.88 M/UL (ref 4–5.4)
SODIUM SERPL-SCNC: 140 MMOL/L (ref 136–145)
TRIGL SERPL-MCNC: 118 MG/DL (ref 30–150)
TSH SERPL DL<=0.005 MIU/L-ACNC: 1.2 UIU/ML (ref 0.4–4)
WBC # BLD AUTO: 4.76 K/UL (ref 3.9–12.7)

## 2024-06-12 PROCEDURE — 80053 COMPREHEN METABOLIC PANEL: CPT | Performed by: INTERNAL MEDICINE

## 2024-06-12 PROCEDURE — 83036 HEMOGLOBIN GLYCOSYLATED A1C: CPT | Performed by: INTERNAL MEDICINE

## 2024-06-12 PROCEDURE — G2211 COMPLEX E/M VISIT ADD ON: HCPCS | Mod: S$PBB,,, | Performed by: INTERNAL MEDICINE

## 2024-06-12 PROCEDURE — 99214 OFFICE O/P EST MOD 30 MIN: CPT | Mod: S$PBB,,, | Performed by: INTERNAL MEDICINE

## 2024-06-12 PROCEDURE — 84443 ASSAY THYROID STIM HORMONE: CPT | Performed by: INTERNAL MEDICINE

## 2024-06-12 PROCEDURE — 80061 LIPID PANEL: CPT | Performed by: INTERNAL MEDICINE

## 2024-06-12 PROCEDURE — 99999 PR PBB SHADOW E&M-EST. PATIENT-LVL IV: CPT | Mod: PBBFAC,,, | Performed by: INTERNAL MEDICINE

## 2024-06-12 PROCEDURE — 99214 OFFICE O/P EST MOD 30 MIN: CPT | Mod: PBBFAC | Performed by: INTERNAL MEDICINE

## 2024-06-12 PROCEDURE — 85025 COMPLETE CBC W/AUTO DIFF WBC: CPT | Performed by: INTERNAL MEDICINE

## 2024-06-12 PROCEDURE — 36415 COLL VENOUS BLD VENIPUNCTURE: CPT | Performed by: INTERNAL MEDICINE

## 2024-06-12 RX ORDER — GABAPENTIN 600 MG/1
600 TABLET ORAL 3 TIMES DAILY
COMMUNITY
Start: 2024-06-05

## 2024-06-12 RX ORDER — OLANZAPINE 5 MG/1
5 TABLET ORAL NIGHTLY
COMMUNITY
Start: 2024-04-12

## 2024-06-12 RX ORDER — FLUOXETINE HYDROCHLORIDE 20 MG/1
20 CAPSULE ORAL DAILY
COMMUNITY
Start: 2024-06-05

## 2024-06-12 RX ORDER — QUETIAPINE FUMARATE 25 MG/1
TABLET, FILM COATED ORAL
COMMUNITY
Start: 2024-06-11

## 2024-06-12 RX ORDER — FLUOXETINE HYDROCHLORIDE 40 MG/1
40 CAPSULE ORAL DAILY
COMMUNITY
Start: 2024-06-05

## 2024-06-12 RX ORDER — HYDROXYZINE HYDROCHLORIDE 50 MG/1
100 TABLET, FILM COATED ORAL 2 TIMES DAILY
COMMUNITY
Start: 2024-05-20

## 2024-06-12 RX ORDER — MIRTAZAPINE 15 MG/1
15 TABLET, FILM COATED ORAL NIGHTLY
COMMUNITY
Start: 2024-05-10

## 2024-06-17 PROBLEM — Z13.9 ENCOUNTER FOR MEDICAL SCREENING EXAMINATION: Status: RESOLVED | Noted: 2024-03-13 | Resolved: 2024-06-17

## 2024-06-18 ENCOUNTER — PATIENT MESSAGE (OUTPATIENT)
Dept: INTERNAL MEDICINE | Facility: CLINIC | Age: 34
End: 2024-06-18
Payer: MEDICARE

## 2024-06-27 ENCOUNTER — OFFICE VISIT (OUTPATIENT)
Dept: NEUROLOGY | Facility: CLINIC | Age: 34
End: 2024-06-27
Payer: MEDICARE

## 2024-06-27 VITALS
HEART RATE: 84 BPM | BODY MASS INDEX: 22.97 KG/M2 | HEIGHT: 63 IN | WEIGHT: 129.63 LBS | SYSTOLIC BLOOD PRESSURE: 90 MMHG | DIASTOLIC BLOOD PRESSURE: 58 MMHG

## 2024-06-27 DIAGNOSIS — Z79.899 OTHER LONG TERM (CURRENT) DRUG THERAPY: ICD-10-CM

## 2024-06-27 DIAGNOSIS — F31.9 BIPOLAR 1 DISORDER, DEPRESSED: ICD-10-CM

## 2024-06-27 DIAGNOSIS — M62.838 MUSCLE SPASM: ICD-10-CM

## 2024-06-27 DIAGNOSIS — G25.3 MYOCLONUS: Primary | ICD-10-CM

## 2024-06-27 PROCEDURE — 99999 PR PBB SHADOW E&M-EST. PATIENT-LVL IV: CPT | Mod: PBBFAC,,,

## 2024-06-27 PROCEDURE — 99214 OFFICE O/P EST MOD 30 MIN: CPT | Mod: PBBFAC

## 2024-06-27 NOTE — PROGRESS NOTES
"  Chief Complaint and Duration     Chief Complaint   Patient presents with    Neurologic Problem   Abnormal movements for 2-3 years.    History of Present Illness     Patsy Akins is a 34 y.o. right handed female with a history of multiple medical diagnoses as listed below that presents for  abnormal movement.     Patient presents to clinic visit alone    Referral received from patient's PCP.    Patient has a significant past medical history of bipolar 1 disorder, hypertension, chronic back pain with weakness and posture imbalance, seizure 7/22.     Patient reports a chronic history beginning 2-3 years ago of muscle spasms and what patient describes as "zaps".  Patient reports these muscle spasms and sensations occur in the center back she reports they are not painful she also associates timing is when she is about to fall asleep this can occur at nighttime or when she is about to take a nap.   She characterizes these episodes as spasms in "zaps" which are painless with some twitching present.  These episodes have resolved as time passes she states each episode can last seconds to minutes rarely ever hours.  Severity is scored level 4 on 0-10.  She states that these jerks or muscle spasms affect the torso and sometimes her upper extremities.  Patient denies any recent illness, fever, meningitis, TBI, or damage to neurological system.    Patient was seen by her primary care provider in February of this year for the same complaint in which thoracic and lumbar spine imaging was ordered patient has not had that imaging completed at this time. Patient also has had several emergency room visits due to psychiatric medication side effects and bipolar 1 disorder exacerbations.   In March of this year patient was seen for involuntary movements related to psychosis, sleep deprivation and dissociated episodes.  Patient was PEC'd and placed inpatient. In May of this year patient was seen in emergency room due to side effects " "of Abilify to include tremors and anxiety.  Patient was given Xanax in ER stabilized and was discharged to follow up with psychiatry and outpatient. During that time patient has had EEG workup in which those results were unremarkable. Patient reports last seizure was 7 -22 she has not had any seizure or seizure-like activity since that time patient is currently taking topiramate 200 mg this is being managed by her PCP.   Patient seizure in July of 2022 was provoked as patient ran out of her topiramate in which she had been on for several years prior for migraines.    Patient also notes that when she is dehydrated she can experience symptoms muscular stiffness and fatigue.  Patient states when she does her stretching and exercising before bed or during that day these symptoms of muscle spasms/twitching and "zaps" do not occur.  Patient states that symptoms do not happen every night but several times a week.  Patient also had cervical spine imaging those results were unremarkable.   Patient states over the last 2-3 years she has had several medication changes in regarding to her psychiatric medication.  She no longer takes Atarax, Remeron, or Zyprexa.  She is currently being managed on fluoxetine, gabapentin, Trileptal, Seroquel, and topiramate.  She does take Zofran as needed.   Patient denies snoring is unclear of sleep symptoms as she does not have a bed partner.  But she reports over the years she has been told she talks in her sleep in a can occasionally act out her dreams.    Review of patient's allergies indicates:   Allergen Reactions    Lamotrigine Hives and Itching     Current Outpatient Medications   Medication Sig Dispense Refill    FLUoxetine 20 MG capsule Take 20 mg by mouth once daily.      FLUoxetine 40 MG capsule Take 40 mg by mouth once daily.      gabapentin (NEURONTIN) 600 MG tablet Take 600 mg by mouth 3 (three) times daily.      hydrOXYzine (ATARAX) 50 MG tablet Take 100 mg by mouth as needed.   "    melatonin (MELATIN) 5 mg Take by mouth.      omeprazole (PRILOSEC) 20 MG capsule TAKE 1 CAPSULE BY MOUTH DAILY 30 capsule 10    ondansetron (ZOFRAN-ODT) 4 MG TbDL Take 1 tablet (4 mg total) by mouth every 8 (eight) hours as needed (nausea/vomiting). 15 tablet 0    OXcarbazepine (TRILEPTAL) 300 MG Tab Take 0.5 tablets (150 mg total) by mouth 2 (two) times daily. (Patient taking differently: Take 300 mg by mouth 2 (two) times daily.) 180 tablet 2    QUEtiapine (SEROQUEL) 25 MG Tab Take by mouth 3 (three) times daily.      topiramate (TOPAMAX) 200 MG Tab TAKE 1 TABLET BY MOUTH IN THE EVENING 30 tablet 10    mirtazapine (REMERON) 15 MG tablet Take 15 mg by mouth every evening. (Patient not taking: Reported on 6/27/2024)      OLANZapine (ZYPREXA) 5 MG tablet Take 5 mg by mouth every evening. (Patient not taking: Reported on 6/27/2024)       No current facility-administered medications for this visit.       Medical History     Past Medical History:   Diagnosis Date    Bipolar affective     Bipolar disorder with depression     Anxiety disorder    Chronic back pain     Hypokalemia     Irritable bowel syndrome      History reviewed. No pertinent surgical history.  Family History   Problem Relation Name Age of Onset    Mental illness Mother Zeynep     Alcohol abuse Mother Zeynep     Depression Mother Zeynep     Alcohol abuse Father Jay     Depression Father Jay     Asthma Brother [place]     Depression Brother Luigi     Learning disabilities Brother [place]     Mental illness Brother [place]     Alcohol abuse Maternal Grandmother Joann     Arthritis Maternal Grandmother Joann     Stroke Maternal Grandmother Joann     Depression Paternal Grandmother Darlene     Diabetes Paternal Grandmother Darlene     Stroke Paternal Grandmother Darlene     Stroke Paternal Grandfather Rony     Alcohol abuse Maternal Aunt Suzannee     Mental illness Paternal Aunt Hilda     Miscarriages / Stillbirths Paternal Aunt Vyrose      Social History      Socioeconomic History    Marital status: Single   Occupational History    Occupation: Disabled    Tobacco Use    Smoking status: Never    Smokeless tobacco: Never    Tobacco comments:     Don't like it.   Substance and Sexual Activity    Alcohol use: Not Currently     Alcohol/week: 0.0 standard drinks of alcohol     Comment: Don't drink.    Drug use: No    Sexual activity: Never   Social History Narrative    ** Merged History Encounter **         Single and no children.   On disability due to mental illness.      Social Determinants of Health     Financial Resource Strain: Medium Risk (3/23/2024)    Overall Financial Resource Strain (CARDIA)     Difficulty of Paying Living Expenses: Somewhat hard   Food Insecurity: Food Insecurity Present (3/23/2024)    Hunger Vital Sign     Worried About Running Out of Food in the Last Year: Sometimes true     Ran Out of Food in the Last Year: Never true   Transportation Needs: Unmet Transportation Needs (3/23/2024)    PRAPARE - Transportation     Lack of Transportation (Medical): Yes     Lack of Transportation (Non-Medical): Yes   Physical Activity: Insufficiently Active (3/23/2024)    Exercise Vital Sign     Days of Exercise per Week: 1 day     Minutes of Exercise per Session: 20 min   Stress: Stress Concern Present (3/23/2024)    [employer] - Occupational Stress Questionnaire     Feeling of Stress : Very much   Housing Stability: Low Risk  (3/23/2024)    Housing Stability Vital Sign     Unable to Pay for Housing in the Last Year: No     Number of Places Lived in the Last Year: 1     Unstable Housing in the Last Year: No       Exam     Vitals:    06/27/24 0957   BP: (!) 90/58   Pulse: 84      Physical Exam:  General: Not in acute distress. Not ill-appearing.   HENT: Normocephalic and atraumatic. Moist mucous membranes.  Eyes: Conjunctivae normal.   Pulmonary: Pulmonary effort is normal.   Abdominal: Abdomen is soft and flat.   Skin: Skin is warm  and dry. No rashes.   Psychiatric: Mood flat, periods of anxiousness.        Neurologic Exam   Mental status: oriented to person, place, and time  Attention: Normal. Concentration: normal.  Speech: speech is normal.  Cranial Nerves: PERRL, EOMI intact, V1-V3 Facial sensation intact. Symmetric facies. Hearing grossly intact. Palate and uvula midline, symmetric. No tongue deviation. Trapezius strength intact.     Motor exam: bulk and tone normal. Strength 5/5 in bilateral upper extremities: deltoids, biceps, triceps, wrist flexion/extension, finger abduction/adduction. Strength 5/5 in bilateral lower extremities: hip flexion/extension, thigh adduction/abduction, knee flexion/extension, dorsiflexion/plantarflexion, foot eversion/inversion.    Reflexes: 2+ in bilateral upper extremities: biceps and brachiaradialis, 2+ in bilateral lower extremities: patellar and achilles  Plantar reflex: normal  Denney's/Clonus: negative    Sensory exam: light touch intact    Gait exam: normal  Romberg: negative  Coordination: normal    Tremor: action tremor   Left upper extremity  Cogwheel rigidity: none    Labs and Imaging     Labs: reviewed  No results found for this or any previous visit (from the past 24 hour(s)).    CMP   Thyroid normal  HgA1C%:  5.2  LDL:  112.4    Imaging:   I have personally reviewed the images performed.     HEAD CT:  3/23/24  Impression:  No acute intracranial abnormalities identified.  No acute facial fractures identified    C-Spine W/O contrast 3/23/24  Impression:  No evidence of acute cervical spine fracture or dislocation.    Other procedures: reviewed    EEG 3/11/24  Impression:   This is an unremarkable awake and asleep routine EEG.  Overriding faster frequencies are often seen as a medication effect i.e. lorazepam. There are no prominent focal findings, no epileptiform discharges, and no electrographic seizures.       Assessment and Plan     Problem List Items Addressed This Visit           "Psychiatric    Bipolar 1 disorder, depressed     Other Visit Diagnoses       Myoclonus    -  Primary    Relevant Orders    Paraneoplastic Autoantibody Evaulation, Serum    VITAMIN E    MRI Brain Without Contrast    EMG W/ ULTRASOUND AND NERVE CONDUCTION TEST 2 Extremities    DRUGS OF ABUSE SCREEN, BLOOD    Calcium    Magnesium    Muscle spasm        Relevant Orders    EMG W/ ULTRASOUND AND NERVE CONDUCTION TEST 2 Extremities    Other long term (current) drug therapy        Relevant Orders    DRUGS OF ABUSE SCREEN, BLOOD          Ms. Akins is a 34-year-old female new to me who presents to clinic for evaluation and recommendations in regards to    Abnormal movements. Patient has a significant past medical history of bipolar 1 disorder, hypertension, chronic back pain with weakness and posture imbalance, seizure 7/22.   Patient reports a chronic history of abnormal movements characterizes muscle spasms mid back with abnormal sensations described as "Zaps".   Provocation and timing is reported as  prior to falling asleep or in the middle of sleep.  This can occur at nighttime or during the day when patient is taking naps.   Symptoms are painless jerking contained to the torso and causes her to flail her arms.   These episodes are intermittent does not occur daily can happen several times a week each episode lasts seconds to minutes self resolves with time.   Patient has multiple ER visits in regards to involuntary movements or abnormal movement.  EEG unremarkable MRI C-spine within normal limits.      Patient had a provoked seizure  summer 22 patient was out of topiramate for several months.   Patient has had no seizures or seizure-like activity since that time.  Topiramate is being managed by her PCP.  Patient states if she is physically active and stretches per her routine on those days when she does this she does not have these symptoms at nighttime or falling asleep.  Patient has had many changes to her psychiatric " medication she is currently taking duloxetine gabapentin Trileptal Seroquel and topiramate.  Patient has flat affect and is anxious, Physical examination on today is unremarkable patient has action tremor to left upper extremity.  Patient does not consistently have a bed partner partner states she has been told in the past she does not snore and can occasionally talk in her sleep and maybe act out her dreams.  Denies any familiar history of movement disorder.   Patient is independent in basic ADLs and IADLs.   She is been seen by Psychiatry weekly.  Patient denies any recent fever, illness viral or bacterial, or any focal neurologic symptoms at this time.     Given patient's presentation, chronicity, and previous workup my plan is as follows. Discussed multifactorial nature of  myoclonic diagnosis to include types. Patient's clinical description is characterized as brief shock-like involuntary movements caused by muscular contractions and given the patient has longstanding history of psychiatric medications and anticonvulsants. Differential diagnosis includes physiologic versus symptomatic.  During chart review confirm patient's glucose, renal and liver function, TSH were all within normal limits therefore We will order Labs to evaluate cause of the patients complaint, MRI brain without contrast and schedule MRI thoracic and lumbar which was previously ordered by primary care.  Cervical spine completed and unremarkable.  Patient's last EEG was within normal limits no need to reorder. In addition I will place orders for EMG 2 limb  upper. Otherwise lifestyle modifications including diet, sleep hygiene, and exercise were discussed. Also instructed patient to discuss with Psychiatrist and PCP  current medications that could be contributing to symptoms.  It is my recommendation that a medication side effect profile be discussed in regards to patient's current symptomatology in medication regimen. Patient is on  psychiatric medication and a AED.    Questions and concerns were sought and answered to the patient's stated verbal satisfaction. The patient verbalizes understanding and agreement with the above stated treatment plan.      Continue home medications/regime and follow up with PCP for surveillance and long-term management     Visit today included increased complexity associated with the care of the episodic problem  myoclonus addressed and managing the longitudinal care of the patient due to the serious and/or complex managed problem(s)  vascular/neurological risk factors.    GWEN MolinaC  [facility]  [facility]     [phone]    Follow-up: Follow up in about 6 weeks (around 8/8/2024).  After imaging and blood work    Time spent on this encounter: 106 minutes. This includes face to face time and non-face to face time preparing to see the patient (eg, review of tests), obtaining and/or reviewing separately obtained history, documenting clinical information in the electronic or other health record, independently interpreting results and communicating results to the patient/family/caregiver, or care coordinator.     This note was created by combination of typed  and M-Modal dictation. Transcription and phonetic errors may be present.  If there are any questions, please contact me.

## 2024-07-11 ENCOUNTER — OFFICE VISIT (OUTPATIENT)
Dept: OBSTETRICS AND GYNECOLOGY | Facility: CLINIC | Age: 34
End: 2024-07-11
Payer: MEDICARE

## 2024-07-11 VITALS — BODY MASS INDEX: 23.2 KG/M2 | SYSTOLIC BLOOD PRESSURE: 100 MMHG | WEIGHT: 130.94 LBS | DIASTOLIC BLOOD PRESSURE: 61 MMHG

## 2024-07-11 DIAGNOSIS — Z01.419 CERVICAL SMEAR, AS PART OF ROUTINE GYNECOLOGICAL EXAMINATION: Primary | ICD-10-CM

## 2024-07-11 DIAGNOSIS — B37.31 VAGINAL CANDIDIASIS: ICD-10-CM

## 2024-07-11 PROCEDURE — 99999 PR PBB SHADOW E&M-EST. PATIENT-LVL III: CPT | Mod: PBBFAC,,, | Performed by: STUDENT IN AN ORGANIZED HEALTH CARE EDUCATION/TRAINING PROGRAM

## 2024-07-11 PROCEDURE — 99213 OFFICE O/P EST LOW 20 MIN: CPT | Mod: PBBFAC,25 | Performed by: STUDENT IN AN ORGANIZED HEALTH CARE EDUCATION/TRAINING PROGRAM

## 2024-07-11 RX ORDER — FLUCONAZOLE 150 MG/1
150 TABLET ORAL DAILY
Qty: 1 TABLET | Refills: 0 | Status: SHIPPED | OUTPATIENT
Start: 2024-07-11 | End: 2024-07-12

## 2024-07-11 NOTE — PROGRESS NOTES
Subjective     Patient ID: Patsy Akins is a 34 y.o. female.    Chief Complaint:  Well Woman      History of Present Illness  33 yo G0 presents for WWE and pap    No gyn issues today. Last seen by Dr. Marshall in 2019, has never been sexually active and describes herself as sex-averse  Last pap negative HPV. Has noticed some vaginal itching lately       GYN & OB History  Patient's last menstrual period was 06/29/2024.   Date of Last Pap: 6/11/2019    OB History   No obstetric history on file.       Review of Systems  Review of Systems   Genitourinary:  Positive for vaginal discharge.   All other systems reviewed and are negative.         Objective   Physical Exam:   Constitutional: She appears well-developed and well-nourished.    HENT:   Head: Normocephalic and atraumatic.    Eyes: EOM are normal.      Pulmonary/Chest: Effort normal.        Abdominal: Soft.     Genitourinary:    Pelvic exam was performed with patient in the lithotomy position.   The external female genitalia was normal.   Genitalia hair distrobution normal .   There is no lesion on the right labia. There is no lesion on the left labia. There is vaginal discharge (thick, white discharge) in the vagina.    Genitourinary Comments: Female chaperone present for duration of exam             Musculoskeletal: Normal range of motion.       Neurological: She is alert.    Skin: Skin is warm and dry.    Psychiatric: She has a normal mood and affect.            Assessment and Plan     1. Cervical smear, as part of routine gynecological examination    2. Vaginal candidiasis        Plan:  1. Cervical smear, as part of routine gynecological examination  - Pap and HPV done today.  - Screening tests as ordered.  - Reviewed ASCCP Pap guidelines and screening recommendations.  Unable to place speculum due to patient discomfort, cytobrush placed blindy will send for primary HPV testing    Counseling: HPV vaccine  Health Screens: Health Maintenance reviewed  -  Ambulatory referral/consult to Gynecology  - Liquid-Based Pap Smear, Screening  - HPV High Risk Genotypes, PCR    2. Vaginal candidiasis  - fluconazole (DIFLUCAN) 150 MG Tab; Take 1 tablet (150 mg total) by mouth once daily. for 1 day  Dispense: 1 tablet; Refill: 0      Follow up in 1 year or as needed     Jayme Fiore III, MD  [facility] [place] - OB GYN   [address]  [facility] [id number] [phone]

## 2024-07-26 ENCOUNTER — HOSPITAL ENCOUNTER (OUTPATIENT)
Dept: RADIOLOGY | Facility: HOSPITAL | Age: 34
Discharge: HOME | End: 2024-07-26
Attending: NURSE PRACTITIONER
Payer: MEDICARE

## 2024-07-26 ENCOUNTER — HOSPITAL ENCOUNTER (OUTPATIENT)
Dept: RADIOLOGY | Facility: HOSPITAL | Age: 34
Discharge: HOME | End: 2024-07-26
Payer: MEDICARE

## 2024-07-26 DIAGNOSIS — R25.9 ABNORMAL MOVEMENTS: ICD-10-CM

## 2024-07-26 DIAGNOSIS — G25.3 MYOCLONUS: ICD-10-CM

## 2024-07-26 DIAGNOSIS — M54.9 UPPER BACK PAIN: ICD-10-CM

## 2024-07-26 DIAGNOSIS — M47.816 SPONDYLOSIS OF LUMBAR REGION WITHOUT MYELOPATHY OR RADICULOPATHY: ICD-10-CM

## 2024-07-26 PROCEDURE — 70551 MRI BRAIN STEM W/O DYE: CPT | Mod: TC

## 2024-07-26 PROCEDURE — 72141 MRI NECK SPINE W/O DYE: CPT | Mod: TC

## 2024-07-26 PROCEDURE — 72148 MRI LUMBAR SPINE W/O DYE: CPT | Mod: 26,,, | Performed by: RADIOLOGY

## 2024-07-26 PROCEDURE — 72141 MRI NECK SPINE W/O DYE: CPT | Mod: 26,,, | Performed by: RADIOLOGY

## 2024-07-26 PROCEDURE — 72146 MRI CHEST SPINE W/O DYE: CPT | Mod: 26,,, | Performed by: RADIOLOGY

## 2024-07-26 PROCEDURE — 72148 MRI LUMBAR SPINE W/O DYE: CPT | Mod: TC

## 2024-07-26 PROCEDURE — 70551 MRI BRAIN STEM W/O DYE: CPT | Mod: 26,,, | Performed by: RADIOLOGY

## 2024-07-29 ENCOUNTER — PATIENT MESSAGE (OUTPATIENT)
Dept: NEUROLOGY | Facility: CLINIC | Age: 34
End: 2024-07-29
Payer: MEDICARE

## 2024-08-02 ENCOUNTER — LAB VISIT (OUTPATIENT)
Dept: LAB | Facility: HOSPITAL | Age: 34
End: 2024-08-02
Payer: MEDICARE

## 2024-08-02 ENCOUNTER — OFFICE VISIT (OUTPATIENT)
Dept: NEUROLOGY | Facility: CLINIC | Age: 34
End: 2024-08-02
Payer: MEDICARE

## 2024-08-02 VITALS
HEIGHT: 63 IN | HEART RATE: 95 BPM | SYSTOLIC BLOOD PRESSURE: 109 MMHG | DIASTOLIC BLOOD PRESSURE: 62 MMHG | BODY MASS INDEX: 23.28 KG/M2 | WEIGHT: 131.38 LBS

## 2024-08-02 DIAGNOSIS — Z79.899 OTHER LONG TERM (CURRENT) DRUG THERAPY: ICD-10-CM

## 2024-08-02 DIAGNOSIS — G25.3 MYOCLONUS: Primary | ICD-10-CM

## 2024-08-02 DIAGNOSIS — M62.838 MUSCLE SPASM: ICD-10-CM

## 2024-08-02 DIAGNOSIS — G25.3 MYOCLONUS: ICD-10-CM

## 2024-08-02 LAB
CALCIUM SERPL-MCNC: 9.2 MG/DL (ref 8.7–10.5)
MAGNESIUM SERPL-MCNC: 1.9 MG/DL (ref 1.6–2.6)

## 2024-08-02 PROCEDURE — 84446 ASSAY OF VITAMIN E: CPT

## 2024-08-02 PROCEDURE — 99213 OFFICE O/P EST LOW 20 MIN: CPT | Mod: PBBFAC

## 2024-08-02 PROCEDURE — 83735 ASSAY OF MAGNESIUM: CPT

## 2024-08-02 PROCEDURE — 82310 ASSAY OF CALCIUM: CPT

## 2024-08-02 PROCEDURE — 99999 PR PBB SHADOW E&M-EST. PATIENT-LVL III: CPT | Mod: PBBFAC,,,

## 2024-08-02 PROCEDURE — 80307 DRUG TEST PRSMV CHEM ANLYZR: CPT

## 2024-08-02 NOTE — PROGRESS NOTES
"  [facility] VISIT    Chief Complaint and Duration     Chief Complaint   Patient presents with    Follow-up     Mri results    For Abnormal movements for 2-3 years.    History of Present Illness     Patsy Akins is a 34 y.o. right handed female with a history of multiple medical diagnoses as listed below that presents for  abnormal movement.     Patient presents to clinic visit alone    Referral received from patient's PCP.    Patient has a significant past medical history of bipolar 1 disorder, hypertension, chronic back pain with weakness and posture imbalance, seizure 7/22.     Patient reports a chronic history beginning 2-3 years ago of muscle spasms and what patient describes as "zaps".  Patient reports these muscle spasms and sensations occur in the center back she reports they are not painful she also associates timing is when she is about to fall asleep this can occur at nighttime or when she is about to take a nap.   She characterizes these episodes as spasms in "zaps" which are painless with some twitching present.  These episodes have resolved as time passes she states each episode can last seconds to minutes rarely ever hours.  Severity is scored level 4 on 0-10.  She states that these jerks or muscle spasms affect the torso and sometimes her upper extremities.  Patient denies any recent illness, fever, meningitis, TBI, or damage to neurological system.    Patient was seen by her primary care provider in February of this year for the same complaint in which thoracic and lumbar spine imaging was ordered patient has not had that imaging completed at this time. Patient also has had several emergency room visits due to psychiatric medication side effects and bipolar 1 disorder exacerbations.   In March of this year patient was seen for involuntary movements related to psychosis, sleep deprivation and dissociated episodes.  Patient was PEC'd and placed inpatient. In May of " "this year patient was seen in emergency room due to side effects of Abilify to include tremors and anxiety.  Patient was given Xanax in ER stabilized and was discharged to follow up with psychiatry and outpatient. During that time patient has had EEG workup in which those results were unremarkable. Patient reports last seizure was 7 -22 she has not had any seizure or seizure-like activity since that time patient is currently taking topiramate 200 mg this is being managed by her PCP.   Patient seizure in July of 2022 was provoked as patient ran out of her topiramate in which she had been on for several years prior for migraines.    Patient also notes that when she is dehydrated she can experience symptoms muscular stiffness and fatigue.  Patient states when she does her stretching and exercising before bed or during that day these symptoms of muscle spasms/twitching and "zaps" do not occur.  Patient states that symptoms do not happen every night but several times a week.  Patient also had cervical spine imaging those results were unremarkable.   Patient states over the last 2-3 years she has had several medication changes in regarding to her psychiatric medication.  She no longer takes Atarax, Remeron, or Zyprexa.  She is currently being managed on fluoxetine, gabapentin, Trileptal, Seroquel, and topiramate.  She does take Zofran as needed.   Patient denies snoring is unclear of sleep symptoms as she does not have a bed partner.  But she reports over the years she has been told she talks in her sleep in a can occasionally act out her dreams.    Interval history 8/2/24:    Patient presents to clinic for follow-up of abnormal movement episodes after imaging. At the time of today's visit, the patient denies new or worsening focal neurologic symptoms or any symptoms concerning for a new undocumented episodes/events. Patient is doing well overall gradual improvements and symptom.  Patient reports symptoms is still " occurring a few times a week prior to falling asleep more frequently and occurs less frequently upon getting up.  Patient states that she noticed when she is physically active during the day these symptoms do not occur at bedtime or upon awakening.  She states when she was previously in physical therapy symptoms abated completely.  She is following up with Psychiatry in 2 weeks. Patient did not get labs ordered on last visit and EMG appointment is pending in September.  Patient is independent in all basic ADLs and IADLs with no debilitating complaint.  Reviewed MRI imaging.    Review of patient's allergies indicates:   Allergen Reactions    Lamotrigine Hives and Itching     Current Outpatient Medications   Medication Sig Dispense Refill    FLUoxetine 20 MG capsule Take 20 mg by mouth once daily.      FLUoxetine 40 MG capsule Take 40 mg by mouth once daily.      gabapentin (NEURONTIN) 600 MG tablet Take 600 mg by mouth 3 (three) times daily.      hydrOXYzine (ATARAX) 50 MG tablet Take 100 mg by mouth as needed.      melatonin (MELATIN) 5 mg Take by mouth.      omeprazole (PRILOSEC) 20 MG capsule TAKE 1 CAPSULE BY MOUTH DAILY 30 capsule 10    ondansetron (ZOFRAN-ODT) 4 MG TbDL Take 1 tablet (4 mg total) by mouth every 8 (eight) hours as needed (nausea/vomiting). 15 tablet 0    OXcarbazepine (TRILEPTAL) 300 MG Tab Take 0.5 tablets (150 mg total) by mouth 2 (two) times daily. (Patient taking differently: Take 300 mg by mouth 2 (two) times daily.) 180 tablet 2    QUEtiapine (SEROQUEL) 25 MG Tab Take by mouth 3 (three) times daily.      topiramate (TOPAMAX) 200 MG Tab TAKE 1 TABLET BY MOUTH IN THE EVENING 30 tablet 10    mirtazapine (REMERON) 15 MG tablet Take 15 mg by mouth every evening. (Patient not taking: Reported on 6/27/2024)      OLANZapine (ZYPREXA) 5 MG tablet Take 5 mg by mouth every evening. (Patient not taking: Reported on 6/27/2024)       No current facility-administered medications for this visit.        Medical History     Past Medical History:   Diagnosis Date    Bipolar affective     Bipolar disorder with depression     Anxiety disorder    Chronic back pain     Hypokalemia     Irritable bowel syndrome      History reviewed. No pertinent surgical history.  Family History   Problem Relation Name Age of Onset    Mental illness Mother Zeynep     Alcohol abuse Mother Zeynep     Depression Mother Zeynep     Alcohol abuse Father Jay     Depression Father Jay     Asthma Brother Luigi     Depression Brother Luigi     Learning disabilities Brother Luigi     Mental illness Brother Trent     Alcohol abuse Maternal Grandmother Joann     Arthritis Maternal Grandmother Poole     Stroke Maternal Grandmother Poole     Depression Paternal Grandmother Darlene     Diabetes Paternal Grandmother Darlene     Stroke Paternal Grandmother Darlene     Stroke Paternal Grandfather Rony     Alcohol abuse Maternal Aunt Marjorie     Mental illness Paternal Aunt Hilda     Miscarriages / Stillbirths Paternal Aunt Yvrose     Breast cancer Neg Hx      Colon cancer Neg Hx      Ovarian cancer Neg Hx       Social History     Socioeconomic History    Marital status: Single   Occupational History    Occupation: Disabled    Tobacco Use    Smoking status: Never    Smokeless tobacco: Never    Tobacco comments:     Don't like it.   Substance and Sexual Activity    Alcohol use: Not Currently     Alcohol/week: 0.0 standard drinks of alcohol     Comment: Don't drink.    Drug use: No    Sexual activity: Never   Social History Narrative    ** Merged History Encounter **         Single and no children.   On disability due to mental illness.      Social Determinants of Health     Financial Resource Strain: Medium Risk (3/23/2024)    Overall Financial Resource Strain (CARDIA)     Difficulty of Paying Living Expenses: Somewhat hard   Food Insecurity: Food Insecurity Present (3/23/2024)    Hunger Vital Sign     Worried About Running Out of Food in the  Last Year: Sometimes true     Ran Out of Food in the Last Year: Never true   Transportation Needs: Unmet Transportation Needs (3/23/2024)    PRAPARE - Transportation     Lack of Transportation (Medical): Yes     Lack of Transportation (Non-Medical): Yes   Physical Activity: Insufficiently Active (3/23/2024)    Exercise Vital Sign     Days of Exercise per Week: 1 day     Minutes of Exercise per Session: 20 min   Stress: Stress Concern Present (3/23/2024)    [employer] [place] [employer] - Occupational Stress Questionnaire     Feeling of Stress : Very much   Housing Stability: Low Risk  (3/23/2024)    Housing Stability Vital Sign     Unable to Pay for Housing in the Last Year: No     Number of Places Lived in the Last Year: 1     Unstable Housing in the Last Year: No       Exam     Vitals:    08/02/24 1352   BP: 109/62   Pulse: 95      Physical Exam:  General: Not in acute distress. Not ill-appearing.   HENT: Normocephalic and atraumatic. Moist mucous membranes.  Eyes: Conjunctivae normal.   Pulmonary: Pulmonary effort is normal.   Abdominal: Abdomen is soft and flat.   Skin: Skin is warm and dry. No rashes.   Psychiatric: Mood flat, periods of anxiousness.        Neurologic Exam   Mental status: oriented to person, place, and time  Attention: Normal. Concentration: normal.  Speech: speech is normal.  Cranial Nerves: PERRL, EOMI intact, V1-V3 Facial sensation intact. Symmetric facies. Hearing grossly intact. Palate and uvula midline, symmetric. No tongue deviation. Trapezius strength intact.     Motor exam: bulk and tone normal. Strength 5/5 in bilateral upper extremities: deltoids, biceps, triceps, wrist flexion/extension, finger abduction/adduction. Strength 5/5 in bilateral lower extremities: hip flexion/extension, thigh adduction/abduction, knee flexion/extension, dorsiflexion/plantarflexion, foot eversion/inversion.    Reflexes: 2+ in bilateral upper extremities: biceps and brachiaradialis, 2+ in  bilateral lower extremities: patellar and achilles  Plantar reflex: normal  Denney's/Clonus: negative    Sensory exam: pin prick and light touch intact    Gait exam: normal  Romberg: negative  Coordination: normal    Tremor: action tremor   Left upper extremity  Cogwheel rigidity: none    Labs and Imaging     Labs: reviewed  Recent Results (from the past 24 hour(s))   Calcium    Collection Time: 08/02/24  2:40 PM   Result Value Ref Range    Calcium 9.2 8.7 - 10.5 mg/dL   Magnesium    Collection Time: 08/02/24  2:40 PM   Result Value Ref Range    Magnesium 1.9 1.6 - 2.6 mg/dL       CMP   Thyroid normal  HgA1C%:  5.2  LDL:  112.4    Imaging:   I have personally reviewed the images performed.     HEAD CT:  3/23/24  Impression:  No acute intracranial abnormalities identified.  No acute facial fractures identified    C-Spine W/O contrast 3/23/24  Impression:  No evidence of acute cervical spine fracture or dislocation.    MRI spine cervical-thoracic-lumbar 07/26/2024:  The spinal cord maintains normal signal intensity.   No advanced degenerative changes/stenosis.    Cervical spine:  C5-6, disc bulging and endplate osteophyte formation without advanced central stenosis.  Mild right foraminal stenosis due to uncovertebral hypertrophy.     Thoracic spine:  No advanced central or foraminal stenosis throughout the thoracic spine.     Lumbar spine:  No disc herniation or advanced central or foraminal stenosis.    Other procedures: reviewed    EEG 3/11/24  Impression:   This is an unremarkable awake and asleep routine EEG.  Overriding faster frequencies are often seen as a medication effect i.e. lorazepam. There are no prominent focal findings, no epileptiform discharges, and no electrographic seizures.       Assessment and Plan     Problem List Items Addressed This Visit    None  Visit Diagnoses       Myoclonus    -  Primary    Other long term (current) drug therapy        Muscle spasm              Ms. Akins is a 34-year-old  female known to me who presents to clinic for follow-up of abnormal movement episodes after imaging. At the time of today's visit, the patient denies new or worsening focal neurologic symptoms or any symptoms concerning for a new undocumented episodes/events. Patient is doing well overall gradual improvements and symptom.  Patient reports symptoms is still occurring a few times a week prior to falling asleep more frequently and occurs less frequently upon getting up.  Patient states that she noticed when she is physically active during the day these symptoms do not occur at bedtime or upon awakening.  She states when she was previously in physical therapy symptoms abated completely.  She is following up with Psychiatry in 2 weeks. Patient did not get labs ordered on last visit and EMG appointment is pending in September.  Patient is independent in all basic ADLs and IADLs with no debilitating complaint.      Reviewed MRI imaging. Cervico-thoracic-lumbar spine reviewed cervical spine has mild changes endplate osteophyte with disc bulging in nature thoracic and lumbar spine are unremarkable. Discussed multifactorial nature of myoclonic diagnosis to include types. EMG 2 limb upper appointment pending in September patient did not have labs drawn ordered on last visit instructed patient to have labs drawn as soon as she leaves today's visit.  These labs will be needed to complete workup.  Otherwise lifestyle modifications including diet, sleep hygiene, and exercise were discussed. Also reinforced again for patient to discuss with Psychiatrist and PCP current medications that could be contributing to symptoms.  It is my recommendation that a medication side effect profile be discussed in regards to patient's current symptomatology in medication regimen. Patient is on psychiatric medication and a AED.  Patient will return to clinic after EMG for follow-up. It is reasonable after workup is completed if no source is found  from a neurological standpoint patient can return to physical therapy per her request. As future plans and recommendations I will place orders for physical therapy after EMG has resulted.    Questions and concerns were sought and answered to the patient's stated verbal satisfaction. The patient verbalizes understanding and agreement with the above stated treatment plan.      Continue home medications/regime and follow up with PCP for surveillance and long-term management     Visit today included increased complexity associated with the care of the episodic problem  myoclonus addressed and managing the longitudinal care of the patient due to the serious and/or complex managed problem(s)  vascular/neurological risk factors.    GWEN MolinaC  [facility]  Department of Neurology- [facility]     [phone]    Follow-up: Follow up in about 8 weeks (around 9/27/2024).  After labs and EMG    Time spent on this encounter: 55 minutes. This includes face to face time and non-face to face time preparing to see the patient (eg, review of tests), obtaining and/or reviewing separately obtained history, documenting clinical information in the electronic or other health record, independently interpreting results and communicating results to the patient/family/caregiver, or care coordinator.     This note was created by combination of typed  and M-Modal dictation. Transcription and phonetic errors may be present.  If there are any questions, please contact me.

## 2024-08-07 LAB
AMPHETAMINES SERPL QL: NEGATIVE
BARBITURATES SERPL QL SCN: NEGATIVE
BENZODIAZ SERPL QL SCN: NEGATIVE
BZE SERPL QL: NEGATIVE
CARBOXYTHC SERPL QL SCN: NEGATIVE
ETHANOL SERPL QL SCN: NEGATIVE
METHADONE SERPL QL SCN: NEGATIVE
OPIATES SERPL QL SCN: NEGATIVE
PCP SERPL QL SCN: NEGATIVE
PROPOXYPH SERPL QL: NEGATIVE

## 2024-08-08 LAB — A-TOCOPHEROL VIT E SERPL-MCNC: 1723 UG/DL (ref 500–1800)

## 2024-08-19 ENCOUNTER — HOSPITAL ENCOUNTER (INPATIENT)
Facility: HOSPITAL | Age: 34
LOS: 1 days | Discharge: HOME | DRG: 493 | End: 2024-08-21
Attending: EMERGENCY MEDICINE | Admitting: HOSPITALIST
Payer: MEDICARE

## 2024-08-19 DIAGNOSIS — E87.6 HYPOKALEMIA: ICD-10-CM

## 2024-08-19 DIAGNOSIS — S42.492B: Primary | ICD-10-CM

## 2024-08-19 DIAGNOSIS — R07.9 CHEST PAIN: ICD-10-CM
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
## 2024-08-19 LAB
ABO + RH BLD: NORMAL
ALBUMIN SERPL BCP-MCNC: 3.7 G/DL (ref 3.5–5.2)
ALP SERPL-CCNC: 75 U/L (ref 55–135)
ALT SERPL W/O P-5'-P-CCNC: 14 U/L (ref 10–44)
ANION GAP SERPL CALC-SCNC: 10 MMOL/L (ref 8–16)
AST SERPL-CCNC: 20 U/L (ref 10–40)
B-HCG UR QL: NEGATIVE
BASOPHILS # BLD AUTO: 0.04 K/UL (ref 0–0.2)
BASOPHILS NFR BLD: 0.4 % (ref 0–1.9)
BILIRUB SERPL-MCNC: 0.1 MG/DL (ref 0.1–1)
BLD GP AB SCN CELLS X3 SERPL QL: NORMAL
BUN SERPL-MCNC: 18 MG/DL (ref 6–20)
CALCIUM SERPL-MCNC: 8.7 MG/DL (ref 8.7–10.5)
CHLORIDE SERPL-SCNC: 110 MMOL/L (ref 95–110)
CO2 SERPL-SCNC: 18 MMOL/L (ref 23–29)
CREAT SERPL-MCNC: 0.8 MG/DL (ref 0.5–1.4)
CTP QC/QA: YES
DIFFERENTIAL METHOD BLD: ABNORMAL
EOSINOPHIL # BLD AUTO: 0.1 K/UL (ref 0–0.5)
EOSINOPHIL NFR BLD: 0.9 % (ref 0–8)
ERYTHROCYTE [DISTWIDTH] IN BLOOD BY AUTOMATED COUNT: 18.6 % (ref 11.5–14.5)
EST. GFR  (NO RACE VARIABLE): >60 ML/MIN/1.73 M^2
GLUCOSE SERPL-MCNC: 95 MG/DL (ref 70–110)
HCT VFR BLD AUTO: 33.9 % (ref 37–48.5)
HGB BLD-MCNC: 10.3 G/DL (ref 12–16)
IMM GRANULOCYTES # BLD AUTO: 0.03 K/UL (ref 0–0.04)
IMM GRANULOCYTES NFR BLD AUTO: 0.3 % (ref 0–0.5)
LYMPHOCYTES # BLD AUTO: 1.9 K/UL (ref 1–4.8)
LYMPHOCYTES NFR BLD: 18.8 % (ref 18–48)
MAGNESIUM SERPL-MCNC: 1.8 MG/DL (ref 1.6–2.6)
MCH RBC QN AUTO: 22.8 PG (ref 27–31)
MCHC RBC AUTO-ENTMCNC: 30.4 G/DL (ref 32–36)
MCV RBC AUTO: 75 FL (ref 82–98)
MONOCYTES # BLD AUTO: 0.8 K/UL (ref 0.3–1)
MONOCYTES NFR BLD: 7.8 % (ref 4–15)
NEUTROPHILS # BLD AUTO: 7.4 K/UL (ref 1.8–7.7)
NEUTROPHILS NFR BLD: 71.8 % (ref 38–73)
NRBC BLD-RTO: 0 /100 WBC
PLATELET # BLD AUTO: 183 K/UL (ref 150–450)
PMV BLD AUTO: 8.1 FL (ref 9.2–12.9)
POTASSIUM SERPL-SCNC: 3.1 MMOL/L (ref 3.5–5.1)
PROT SERPL-MCNC: 6.6 G/DL (ref 6–8.4)
RBC # BLD AUTO: 4.52 M/UL (ref 4–5.4)
SODIUM SERPL-SCNC: 138 MMOL/L (ref 136–145)
SPECIMEN OUTDATE: NORMAL
WBC # BLD AUTO: 10.28 K/UL (ref 3.9–12.7)

## 2024-08-19 PROCEDURE — G0378 HOSPITAL OBSERVATION PER HR: HCPCS

## 2024-08-19 PROCEDURE — 83735 ASSAY OF MAGNESIUM: CPT | Performed by: PHYSICIAN ASSISTANT

## 2024-08-19 PROCEDURE — 29105 APPLICATION LONG ARM SPLINT: CPT | Mod: LT

## 2024-08-19 PROCEDURE — 96365 THER/PROPH/DIAG IV INF INIT: CPT

## 2024-08-19 PROCEDURE — 63600175 PHARM REV CODE 636 W HCPCS

## 2024-08-19 PROCEDURE — 96375 TX/PRO/DX INJ NEW DRUG ADDON: CPT

## 2024-08-19 PROCEDURE — 96366 THER/PROPH/DIAG IV INF ADDON: CPT

## 2024-08-19 PROCEDURE — 80053 COMPREHEN METABOLIC PANEL: CPT | Performed by: PHYSICIAN ASSISTANT

## 2024-08-19 PROCEDURE — 86901 BLOOD TYPING SEROLOGIC RH(D): CPT | Performed by: PHYSICIAN ASSISTANT

## 2024-08-19 PROCEDURE — 81025 URINE PREGNANCY TEST: CPT | Performed by: PHYSICIAN ASSISTANT

## 2024-08-19 PROCEDURE — 86900 BLOOD TYPING SEROLOGIC ABO: CPT | Performed by: PHYSICIAN ASSISTANT

## 2024-08-19 PROCEDURE — 63600175 PHARM REV CODE 636 W HCPCS: Performed by: PHYSICIAN ASSISTANT

## 2024-08-19 PROCEDURE — 25000003 PHARM REV CODE 250: Performed by: PHYSICIAN ASSISTANT

## 2024-08-19 PROCEDURE — 25000003 PHARM REV CODE 250

## 2024-08-19 PROCEDURE — 99285 EMERGENCY DEPT VISIT HI MDM: CPT | Mod: 25

## 2024-08-19 PROCEDURE — 85025 COMPLETE CBC W/AUTO DIFF WBC: CPT | Performed by: PHYSICIAN ASSISTANT

## 2024-08-19 PROCEDURE — 86850 RBC ANTIBODY SCREEN: CPT | Performed by: PHYSICIAN ASSISTANT

## 2024-08-19 RX ORDER — IBUPROFEN 200 MG
16 TABLET ORAL
Status: DISCONTINUED | OUTPATIENT
Start: 2024-08-20 | End: 2024-08-20

## 2024-08-19 RX ORDER — ONDANSETRON HYDROCHLORIDE 2 MG/ML
4 INJECTION, SOLUTION INTRAVENOUS EVERY 8 HOURS PRN
Status: DISCONTINUED | OUTPATIENT
Start: 2024-08-20 | End: 2024-08-21 | Stop reason: HOSPADM

## 2024-08-19 RX ORDER — SODIUM CHLORIDE 0.9 % (FLUSH) 0.9 %
10 SYRINGE (ML) INJECTION EVERY 12 HOURS PRN
Status: DISCONTINUED | OUTPATIENT
Start: 2024-08-20 | End: 2024-08-21 | Stop reason: HOSPADM

## 2024-08-19 RX ORDER — ACETAMINOPHEN 325 MG/1
650 TABLET ORAL EVERY 4 HOURS PRN
Status: DISCONTINUED | OUTPATIENT
Start: 2024-08-20 | End: 2024-08-20

## 2024-08-19 RX ORDER — LORAZEPAM 2 MG/ML
INJECTION INTRAMUSCULAR
Status: COMPLETED
Start: 2024-08-19 | End: 2024-08-19

## 2024-08-19 RX ORDER — HYDROCODONE BITARTRATE AND ACETAMINOPHEN 10; 325 MG/1; MG/1
1 TABLET ORAL EVERY 6 HOURS PRN
Status: DISCONTINUED | OUTPATIENT
Start: 2024-08-20 | End: 2024-08-21 | Stop reason: HOSPADM

## 2024-08-19 RX ORDER — NALOXONE HCL 0.4 MG/ML
0.02 VIAL (ML) INJECTION
Status: DISCONTINUED | OUTPATIENT
Start: 2024-08-20 | End: 2024-08-21 | Stop reason: HOSPADM

## 2024-08-19 RX ORDER — LORAZEPAM 2 MG/ML
0.5 INJECTION INTRAMUSCULAR
Status: DISCONTINUED | OUTPATIENT
Start: 2024-08-19 | End: 2024-08-20

## 2024-08-19 RX ORDER — TALC
6 POWDER (GRAM) TOPICAL NIGHTLY PRN
Status: DISCONTINUED | OUTPATIENT
Start: 2024-08-20 | End: 2024-08-21 | Stop reason: HOSPADM

## 2024-08-19 RX ORDER — GLUCAGON 1 MG
1 KIT INJECTION
Status: DISCONTINUED | OUTPATIENT
Start: 2024-08-20 | End: 2024-08-20

## 2024-08-19 RX ORDER — MUPIROCIN 20 MG/G
1 OINTMENT TOPICAL
Status: COMPLETED | OUTPATIENT
Start: 2024-08-19 | End: 2024-08-19

## 2024-08-19 RX ORDER — AMOXICILLIN 250 MG
1 CAPSULE ORAL DAILY PRN
Status: DISCONTINUED | OUTPATIENT
Start: 2024-08-20 | End: 2024-08-20

## 2024-08-19 RX ORDER — IBUPROFEN 200 MG
24 TABLET ORAL
Status: DISCONTINUED | OUTPATIENT
Start: 2024-08-20 | End: 2024-08-20

## 2024-08-19 RX ORDER — MORPHINE SULFATE 4 MG/ML
4 INJECTION, SOLUTION INTRAMUSCULAR; INTRAVENOUS
Status: COMPLETED | OUTPATIENT
Start: 2024-08-19 | End: 2024-08-19

## 2024-08-19 RX ORDER — GABAPENTIN 300 MG/1
300 CAPSULE ORAL 3 TIMES DAILY
Status: DISCONTINUED | OUTPATIENT
Start: 2024-08-19 | End: 2024-08-20

## 2024-08-19 RX ORDER — QUETIAPINE FUMARATE 25 MG/1
25 TABLET, FILM COATED ORAL NIGHTLY
Status: DISCONTINUED | OUTPATIENT
Start: 2024-08-19 | End: 2024-08-21 | Stop reason: HOSPADM

## 2024-08-19 RX ORDER — MORPHINE SULFATE 4 MG/ML
3 INJECTION, SOLUTION INTRAMUSCULAR; INTRAVENOUS
Status: DISCONTINUED | OUTPATIENT
Start: 2024-08-19 | End: 2024-08-19

## 2024-08-19 RX ORDER — SIMETHICONE 80 MG
1 TABLET,CHEWABLE ORAL 4 TIMES DAILY PRN
Status: DISCONTINUED | OUTPATIENT
Start: 2024-08-20 | End: 2024-08-21 | Stop reason: HOSPADM

## 2024-08-19 RX ADMIN — QUETIAPINE FUMARATE 25 MG: 25 TABLET ORAL at 10:08

## 2024-08-19 RX ADMIN — DEXTROSE MONOHYDRATE 1 G: 2.5 INJECTION INTRAVENOUS at 10:08

## 2024-08-19 RX ADMIN — GABAPENTIN 300 MG: 300 CAPSULE ORAL at 10:08

## 2024-08-19 RX ADMIN — MORPHINE SULFATE 4 MG: 4 INJECTION, SOLUTION INTRAMUSCULAR; INTRAVENOUS at 09:08

## 2024-08-19 RX ADMIN — LORAZEPAM 0.5 MG: 2 INJECTION INTRAMUSCULAR; INTRAVENOUS at 09:08

## 2024-08-19 RX ADMIN — POTASSIUM BICARBONATE 50 MEQ: 977.5 TABLET, EFFERVESCENT ORAL at 10:08

## 2024-08-19 RX ADMIN — LORAZEPAM 0.5 MG: 2 INJECTION INTRAMUSCULAR at 09:08

## 2024-08-19 RX ADMIN — DEXTROSE MONOHYDRATE 1 G: 2.5 INJECTION INTRAVENOUS at 09:08

## 2024-08-19 RX ADMIN — SODIUM CHLORIDE 1000 ML: 9 INJECTION, SOLUTION INTRAVENOUS at 09:08

## 2024-08-19 RX ADMIN — MUPIROCIN 1 TUBE: 20 OINTMENT TOPICAL at 08:08

## 2024-08-19 NOTE — Clinical Note
Diagnosis: Open bicondylar fracture of left humerus, initial encounter [[id number]]   Future Attending Provider: TALA GIFFORD [[id number]]   Special Needs:: No Special Needs [1]

## 2024-08-20 ENCOUNTER — ANESTHESIA (OUTPATIENT)
Dept: SURGERY | Facility: HOSPITAL | Age: 34
End: 2024-08-20
Payer: MEDICARE

## 2024-08-20 ENCOUNTER — ANESTHESIA EVENT (OUTPATIENT)
Dept: SURGERY | Facility: HOSPITAL | Age: 34
End: 2024-08-20
Payer: MEDICARE

## 2024-08-20 LAB
ANION GAP SERPL CALC-SCNC: 6 MMOL/L (ref 8–16)
APTT PPP: <21 SEC (ref 21–32)
BASOPHILS # BLD AUTO: 0.02 K/UL (ref 0–0.2)
BASOPHILS NFR BLD: 0.3 % (ref 0–1.9)
BUN SERPL-MCNC: 11 MG/DL (ref 6–20)
CALCIUM SERPL-MCNC: 8 MG/DL (ref 8.7–10.5)
CHLORIDE SERPL-SCNC: 112 MMOL/L (ref 95–110)
CO2 SERPL-SCNC: 20 MMOL/L (ref 23–29)
CREAT SERPL-MCNC: 0.6 MG/DL (ref 0.5–1.4)
DIFFERENTIAL METHOD BLD: ABNORMAL
EOSINOPHIL # BLD AUTO: 0.1 K/UL (ref 0–0.5)
EOSINOPHIL NFR BLD: 1.1 % (ref 0–8)
ERYTHROCYTE [DISTWIDTH] IN BLOOD BY AUTOMATED COUNT: 18.6 % (ref 11.5–14.5)
EST. GFR  (NO RACE VARIABLE): >60 ML/MIN/1.73 M^2
FERRITIN SERPL-MCNC: 4 NG/ML (ref 20–300)
FOLATE SERPL-MCNC: 16.7 NG/ML (ref 4–24)
GLUCOSE SERPL-MCNC: 88 MG/DL (ref 70–110)
HCT VFR BLD AUTO: 29.2 % (ref 37–48.5)
HGB BLD-MCNC: 8.7 G/DL (ref 12–16)
IMM GRANULOCYTES # BLD AUTO: 0.01 K/UL (ref 0–0.04)
IMM GRANULOCYTES NFR BLD AUTO: 0.2 % (ref 0–0.5)
INR PPP: 1 (ref 0.8–1.2)
IRON SERPL-MCNC: 29 UG/DL (ref 30–160)
LYMPHOCYTES # BLD AUTO: 1.8 K/UL (ref 1–4.8)
LYMPHOCYTES NFR BLD: 29.1 % (ref 18–48)
MAGNESIUM SERPL-MCNC: 1.7 MG/DL (ref 1.6–2.6)
MCH RBC QN AUTO: 22 PG (ref 27–31)
MCHC RBC AUTO-ENTMCNC: 29.8 G/DL (ref 32–36)
MCV RBC AUTO: 74 FL (ref 82–98)
MONOCYTES # BLD AUTO: 0.7 K/UL (ref 0.3–1)
MONOCYTES NFR BLD: 11.8 % (ref 4–15)
NEUTROPHILS # BLD AUTO: 3.6 K/UL (ref 1.8–7.7)
NEUTROPHILS NFR BLD: 57.5 % (ref 38–73)
NRBC BLD-RTO: 0 /100 WBC
PHOSPHATE SERPL-MCNC: 2.8 MG/DL (ref 2.7–4.5)
PLATELET # BLD AUTO: 146 K/UL (ref 150–450)
PMV BLD AUTO: 8.2 FL (ref 9.2–12.9)
POTASSIUM SERPL-SCNC: 3.5 MMOL/L (ref 3.5–5.1)
PROTHROMBIN TIME: 11 SEC (ref 9–12.5)
RBC # BLD AUTO: 3.96 M/UL (ref 4–5.4)
SATURATED IRON: 7 % (ref 20–50)
SODIUM SERPL-SCNC: 138 MMOL/L (ref 136–145)
TOTAL IRON BINDING CAPACITY: 441 UG/DL (ref 250–450)
TRANSFERRIN SERPL-MCNC: 298 MG/DL (ref 200–375)
VIT B12 SERPL-MCNC: 272 PG/ML (ref 210–950)
WBC # BLD AUTO: 6.29 K/UL (ref 3.9–12.7)

## 2024-08-20 PROCEDURE — 63600175 PHARM REV CODE 636 W HCPCS: Performed by: PHYSICIAN ASSISTANT

## 2024-08-20 PROCEDURE — 71000033 HC RECOVERY, INTIAL HOUR: Performed by: ORTHOPAEDIC SURGERY

## 2024-08-20 PROCEDURE — 85025 COMPLETE CBC W/AUTO DIFF WBC: CPT

## 2024-08-20 PROCEDURE — 63600175 PHARM REV CODE 636 W HCPCS: Performed by: STUDENT IN AN ORGANIZED HEALTH CARE EDUCATION/TRAINING PROGRAM

## 2024-08-20 PROCEDURE — C1713 ANCHOR/SCREW BN/BN,TIS/BN: HCPCS | Performed by: ORTHOPAEDIC SURGERY

## 2024-08-20 PROCEDURE — 83540 ASSAY OF IRON: CPT | Performed by: HOSPITALIST

## 2024-08-20 PROCEDURE — 25000003 PHARM REV CODE 250: Performed by: ORTHOPAEDIC SURGERY

## 2024-08-20 PROCEDURE — 63600175 PHARM REV CODE 636 W HCPCS: Performed by: ORTHOPAEDIC SURGERY

## 2024-08-20 PROCEDURE — 85610 PROTHROMBIN TIME: CPT

## 2024-08-20 PROCEDURE — 27201423 OPTIME MED/SURG SUP & DEVICES STERILE SUPPLY: Performed by: ORTHOPAEDIC SURGERY

## 2024-08-20 PROCEDURE — 0PSG04Z REPOSITION LEFT HUMERAL SHAFT WITH INTERNAL FIXATION DEVICE, OPEN APPROACH: ICD-10-PCS | Performed by: ORTHOPAEDIC SURGERY

## 2024-08-20 PROCEDURE — 63600175 PHARM REV CODE 636 W HCPCS: Performed by: HOSPITALIST

## 2024-08-20 PROCEDURE — 11000001 HC ACUTE MED/SURG PRIVATE ROOM

## 2024-08-20 PROCEDURE — 25000003 PHARM REV CODE 250: Performed by: PHYSICIAN ASSISTANT

## 2024-08-20 PROCEDURE — 36415 COLL VENOUS BLD VENIPUNCTURE: CPT

## 2024-08-20 PROCEDURE — 71000039 HC RECOVERY, EACH ADD'L HOUR: Performed by: ORTHOPAEDIC SURGERY

## 2024-08-20 PROCEDURE — 84100 ASSAY OF PHOSPHORUS: CPT

## 2024-08-20 PROCEDURE — 37000009 HC ANESTHESIA EA ADD 15 MINS: Performed by: ORTHOPAEDIC SURGERY

## 2024-08-20 PROCEDURE — 25000003 PHARM REV CODE 250: Performed by: HOSPITALIST

## 2024-08-20 PROCEDURE — 37000008 HC ANESTHESIA 1ST 15 MINUTES: Performed by: ORTHOPAEDIC SURGERY

## 2024-08-20 PROCEDURE — 36415 COLL VENOUS BLD VENIPUNCTURE: CPT | Performed by: HOSPITALIST

## 2024-08-20 PROCEDURE — 36000711: Performed by: ORTHOPAEDIC SURGERY

## 2024-08-20 PROCEDURE — 82607 VITAMIN B-12: CPT | Performed by: HOSPITALIST

## 2024-08-20 PROCEDURE — 25000003 PHARM REV CODE 250: Performed by: STUDENT IN AN ORGANIZED HEALTH CARE EDUCATION/TRAINING PROGRAM

## 2024-08-20 PROCEDURE — 83735 ASSAY OF MAGNESIUM: CPT

## 2024-08-20 PROCEDURE — 36000710: Performed by: ORTHOPAEDIC SURGERY

## 2024-08-20 PROCEDURE — 25000003 PHARM REV CODE 250

## 2024-08-20 PROCEDURE — 82746 ASSAY OF FOLIC ACID SERUM: CPT | Performed by: HOSPITALIST

## 2024-08-20 PROCEDURE — 80048 BASIC METABOLIC PNL TOTAL CA: CPT

## 2024-08-20 PROCEDURE — 85730 THROMBOPLASTIN TIME PARTIAL: CPT

## 2024-08-20 PROCEDURE — 63600175 PHARM REV CODE 636 W HCPCS: Performed by: ANESTHESIOLOGY

## 2024-08-20 PROCEDURE — 82728 ASSAY OF FERRITIN: CPT | Performed by: HOSPITALIST

## 2024-08-20 DEVICE — SCREW CORTEX 3.5MM X 20MM: Type: IMPLANTABLE DEVICE | Site: ELBOW | Status: FUNCTIONAL

## 2024-08-20 DEVICE — SCREW BONE VA LK 2.7X28MM: Type: IMPLANTABLE DEVICE | Site: ELBOW | Status: FUNCTIONAL

## 2024-08-20 DEVICE — SCREW LOCKING BONE 2.7X24MM: Type: IMPLANTABLE DEVICE | Site: ELBOW | Status: FUNCTIONAL

## 2024-08-20 DEVICE — SCREW CORTEX 3.5 X 22: Type: IMPLANTABLE DEVICE | Site: ELBOW | Status: FUNCTIONAL

## 2024-08-20 DEVICE — SCREW LOCKING BONE 2.7X22MM: Type: IMPLANTABLE DEVICE | Site: ELBOW | Status: FUNCTIONAL

## 2024-08-20 DEVICE — SCREW 2.7MM X 18 LOCKING ANGLE: Type: IMPLANTABLE DEVICE | Site: ELBOW | Status: FUNCTIONAL

## 2024-08-20 DEVICE — SCREW LOCKING BONE 2.7X20MM: Type: IMPLANTABLE DEVICE | Site: ELBOW | Status: FUNCTIONAL

## 2024-08-20 DEVICE — SCREW BONE LOCK VA 2.7X26MM: Type: IMPLANTABLE DEVICE | Site: ELBOW | Status: FUNCTIONAL

## 2024-08-20 DEVICE — SCREW LOCKING 3.5 X 20: Type: IMPLANTABLE DEVICE | Site: ELBOW | Status: FUNCTIONAL

## 2024-08-20 DEVICE — SCREW BONE LOCK VA 2.7X50MM: Type: IMPLANTABLE DEVICE | Site: ELBOW | Status: FUNCTIONAL

## 2024-08-20 DEVICE — SCREW BONE LOCK VA 2.7X30MM: Type: IMPLANTABLE DEVICE | Site: ELBOW | Status: FUNCTIONAL

## 2024-08-20 RX ORDER — FENTANYL CITRATE 50 UG/ML
INJECTION, SOLUTION INTRAMUSCULAR; INTRAVENOUS
Status: DISCONTINUED | OUTPATIENT
Start: 2024-08-20 | End: 2024-08-20

## 2024-08-20 RX ORDER — OXYCODONE HYDROCHLORIDE 5 MG/1
10 TABLET ORAL
Status: DISCONTINUED | OUTPATIENT
Start: 2024-08-20 | End: 2024-08-20

## 2024-08-20 RX ORDER — GLUCAGON 1 MG
1 KIT INJECTION
Status: DISCONTINUED | OUTPATIENT
Start: 2024-08-20 | End: 2024-08-20 | Stop reason: HOSPADM

## 2024-08-20 RX ORDER — LIDOCAINE HYDROCHLORIDE 20 MG/ML
INJECTION INTRAVENOUS
Status: DISCONTINUED | OUTPATIENT
Start: 2024-08-20 | End: 2024-08-20

## 2024-08-20 RX ORDER — OXCARBAZEPINE 150 MG/1
150 TABLET, FILM COATED ORAL 2 TIMES DAILY
Status: DISCONTINUED | OUTPATIENT
Start: 2024-08-20 | End: 2024-08-21 | Stop reason: HOSPADM

## 2024-08-20 RX ORDER — ROCURONIUM BROMIDE 10 MG/ML
INJECTION, SOLUTION INTRAVENOUS
Status: DISCONTINUED | OUTPATIENT
Start: 2024-08-20 | End: 2024-08-20

## 2024-08-20 RX ORDER — TOPIRAMATE 100 MG/1
200 TABLET, FILM COATED ORAL NIGHTLY
Status: DISCONTINUED | OUTPATIENT
Start: 2024-08-20 | End: 2024-08-21 | Stop reason: HOSPADM

## 2024-08-20 RX ORDER — ACETAMINOPHEN 325 MG/1
650 TABLET ORAL EVERY 4 HOURS PRN
Status: DISCONTINUED | OUTPATIENT
Start: 2024-08-20 | End: 2024-08-21 | Stop reason: HOSPADM

## 2024-08-20 RX ORDER — FLUOXETINE HYDROCHLORIDE 20 MG/1
20 CAPSULE ORAL DAILY
Status: DISCONTINUED | OUTPATIENT
Start: 2024-08-20 | End: 2024-08-20 | Stop reason: SDUPTHER

## 2024-08-20 RX ORDER — PHENYLEPHRINE HYDROCHLORIDE 10 MG/ML
INJECTION INTRAVENOUS
Status: DISCONTINUED | OUTPATIENT
Start: 2024-08-20 | End: 2024-08-20

## 2024-08-20 RX ORDER — PROPOFOL 10 MG/ML
VIAL (ML) INTRAVENOUS
Status: DISCONTINUED | OUTPATIENT
Start: 2024-08-20 | End: 2024-08-20

## 2024-08-20 RX ORDER — MAGNESIUM SULFATE 1 G/100ML
1 INJECTION INTRAVENOUS ONCE
Status: COMPLETED | OUTPATIENT
Start: 2024-08-20 | End: 2024-08-20

## 2024-08-20 RX ORDER — HYDROMORPHONE HYDROCHLORIDE 2 MG/ML
0.2 INJECTION, SOLUTION INTRAMUSCULAR; INTRAVENOUS; SUBCUTANEOUS EVERY 5 MIN PRN
Status: DISCONTINUED | OUTPATIENT
Start: 2024-08-20 | End: 2024-08-20 | Stop reason: HOSPADM

## 2024-08-20 RX ORDER — MIDAZOLAM HYDROCHLORIDE 1 MG/ML
INJECTION INTRAMUSCULAR; INTRAVENOUS
Status: DISCONTINUED | OUTPATIENT
Start: 2024-08-20 | End: 2024-08-20

## 2024-08-20 RX ORDER — HYDROCODONE BITARTRATE AND ACETAMINOPHEN 5; 325 MG/1; MG/1
1 TABLET ORAL EVERY 6 HOURS PRN
Status: DISCONTINUED | OUTPATIENT
Start: 2024-08-20 | End: 2024-08-21 | Stop reason: HOSPADM

## 2024-08-20 RX ORDER — ONDANSETRON HYDROCHLORIDE 2 MG/ML
INJECTION, SOLUTION INTRAVENOUS
Status: DISCONTINUED | OUTPATIENT
Start: 2024-08-20 | End: 2024-08-20

## 2024-08-20 RX ORDER — POTASSIUM CHLORIDE 20 MEQ/1
40 TABLET, EXTENDED RELEASE ORAL ONCE
Status: COMPLETED | OUTPATIENT
Start: 2024-08-20 | End: 2024-08-20

## 2024-08-20 RX ORDER — AMOXICILLIN 250 MG
1 CAPSULE ORAL 2 TIMES DAILY
Status: DISCONTINUED | OUTPATIENT
Start: 2024-08-20 | End: 2024-08-21 | Stop reason: HOSPADM

## 2024-08-20 RX ORDER — MORPHINE SULFATE 4 MG/ML
4 INJECTION, SOLUTION INTRAMUSCULAR; INTRAVENOUS EVERY 4 HOURS PRN
Status: DISCONTINUED | OUTPATIENT
Start: 2024-08-20 | End: 2024-08-20

## 2024-08-20 RX ORDER — ENOXAPARIN SODIUM 100 MG/ML
40 INJECTION SUBCUTANEOUS EVERY 24 HOURS
Status: DISCONTINUED | OUTPATIENT
Start: 2024-08-21 | End: 2024-08-21 | Stop reason: HOSPADM

## 2024-08-20 RX ORDER — MUPIROCIN 20 MG/G
OINTMENT TOPICAL 2 TIMES DAILY
Status: DISCONTINUED | OUTPATIENT
Start: 2024-08-20 | End: 2024-08-21 | Stop reason: HOSPADM

## 2024-08-20 RX ORDER — DEXAMETHASONE SODIUM PHOSPHATE 4 MG/ML
INJECTION, SOLUTION INTRA-ARTICULAR; INTRALESIONAL; INTRAMUSCULAR; INTRAVENOUS; SOFT TISSUE
Status: DISCONTINUED | OUTPATIENT
Start: 2024-08-20 | End: 2024-08-20

## 2024-08-20 RX ORDER — SODIUM CHLORIDE 0.9 % (FLUSH) 0.9 %
10 SYRINGE (ML) INJECTION
Status: DISCONTINUED | OUTPATIENT
Start: 2024-08-20 | End: 2024-08-20 | Stop reason: HOSPADM

## 2024-08-20 RX ORDER — SODIUM CHLORIDE 9 MG/ML
INJECTION, SOLUTION INTRAVENOUS CONTINUOUS
Status: DISCONTINUED | OUTPATIENT
Start: 2024-08-20 | End: 2024-08-20

## 2024-08-20 RX ORDER — LANOLIN ALCOHOL/MO/W.PET/CERES
1 CREAM (GRAM) TOPICAL DAILY
Status: DISCONTINUED | OUTPATIENT
Start: 2024-08-21 | End: 2024-08-21 | Stop reason: HOSPADM

## 2024-08-20 RX ORDER — HYDROXYZINE HYDROCHLORIDE 25 MG/1
100 TABLET, FILM COATED ORAL
Status: DISCONTINUED | OUTPATIENT
Start: 2024-08-20 | End: 2024-08-21 | Stop reason: HOSPADM

## 2024-08-20 RX ORDER — GABAPENTIN 300 MG/1
600 CAPSULE ORAL 3 TIMES DAILY
Status: DISCONTINUED | OUTPATIENT
Start: 2024-08-20 | End: 2024-08-21 | Stop reason: HOSPADM

## 2024-08-20 RX ORDER — FLUOXETINE HYDROCHLORIDE 20 MG/1
60 CAPSULE ORAL DAILY
Status: DISCONTINUED | OUTPATIENT
Start: 2024-08-20 | End: 2024-08-21 | Stop reason: HOSPADM

## 2024-08-20 RX ORDER — ONDANSETRON HYDROCHLORIDE 2 MG/ML
4 INJECTION, SOLUTION INTRAVENOUS DAILY PRN
Status: DISCONTINUED | OUTPATIENT
Start: 2024-08-20 | End: 2024-08-20 | Stop reason: HOSPADM

## 2024-08-20 RX ADMIN — SODIUM CHLORIDE: 9 INJECTION, SOLUTION INTRAVENOUS at 03:08

## 2024-08-20 RX ADMIN — QUETIAPINE FUMARATE 25 MG: 25 TABLET ORAL at 09:08

## 2024-08-20 RX ADMIN — GLYCOPYRROLATE 0.2 MG: 0.2 INJECTION, SOLUTION INTRAMUSCULAR; INTRAVITREAL at 11:08

## 2024-08-20 RX ADMIN — HYDROCODONE BITARTRATE AND ACETAMINOPHEN 1 TABLET: 10; 325 TABLET ORAL at 02:08

## 2024-08-20 RX ADMIN — POTASSIUM CHLORIDE 40 MEQ: 1500 TABLET, EXTENDED RELEASE ORAL at 09:08

## 2024-08-20 RX ADMIN — PROPOFOL 150 MG: 10 INJECTION, EMULSION INTRAVENOUS at 10:08

## 2024-08-20 RX ADMIN — ONDANSETRON 4 MG: 2 INJECTION, SOLUTION INTRAMUSCULAR; INTRAVENOUS at 12:08

## 2024-08-20 RX ADMIN — TOPIRAMATE 200 MG: 100 TABLET, FILM COATED ORAL at 09:08

## 2024-08-20 RX ADMIN — OXCARBAZEPINE 150 MG: 150 TABLET, FILM COATED ORAL at 09:08

## 2024-08-20 RX ADMIN — HYDROMORPHONE HYDROCHLORIDE 0.2 MG: 2 INJECTION INTRAMUSCULAR; INTRAVENOUS; SUBCUTANEOUS at 02:08

## 2024-08-20 RX ADMIN — MAGNESIUM SULFATE IN DEXTROSE 1 G: 10 INJECTION, SOLUTION INTRAVENOUS at 09:08

## 2024-08-20 RX ADMIN — SENNOSIDES AND DOCUSATE SODIUM 1 TABLET: 50; 8.6 TABLET ORAL at 09:08

## 2024-08-20 RX ADMIN — PHENYLEPHRINE HYDROCHLORIDE 100 MCG: 10 INJECTION INTRAVENOUS at 11:08

## 2024-08-20 RX ADMIN — Medication 6 MG: at 02:08

## 2024-08-20 RX ADMIN — CEFAZOLIN 2 G: 2 INJECTION, POWDER, FOR SOLUTION INTRAMUSCULAR; INTRAVENOUS at 03:08

## 2024-08-20 RX ADMIN — FENTANYL CITRATE 25 MCG: 50 INJECTION, SOLUTION INTRAMUSCULAR; INTRAVENOUS at 12:08

## 2024-08-20 RX ADMIN — DEXAMETHASONE SODIUM PHOSPHATE 4 MG: 4 INJECTION, SOLUTION INTRAMUSCULAR; INTRAVENOUS at 11:08

## 2024-08-20 RX ADMIN — GABAPENTIN 600 MG: 300 CAPSULE ORAL at 09:08

## 2024-08-20 RX ADMIN — SUGAMMADEX 200 MG: 100 INJECTION, SOLUTION INTRAVENOUS at 01:08

## 2024-08-20 RX ADMIN — ROCURONIUM BROMIDE 15 MG: 10 INJECTION, SOLUTION INTRAVENOUS at 12:08

## 2024-08-20 RX ADMIN — GABAPENTIN 600 MG: 300 CAPSULE ORAL at 03:08

## 2024-08-20 RX ADMIN — FENTANYL CITRATE 25 MCG: 50 INJECTION, SOLUTION INTRAMUSCULAR; INTRAVENOUS at 10:08

## 2024-08-20 RX ADMIN — HYDROCODONE BITARTRATE AND ACETAMINOPHEN 1 TABLET: 5; 325 TABLET ORAL at 06:08

## 2024-08-20 RX ADMIN — FLUOXETINE HYDROCHLORIDE 60 MG: 20 CAPSULE ORAL at 09:08

## 2024-08-20 RX ADMIN — ACETAMINOPHEN 650 MG: 325 TABLET ORAL at 09:08

## 2024-08-20 RX ADMIN — HYDROXYZINE HYDROCHLORIDE 100 MG: 25 TABLET, FILM COATED ORAL at 10:08

## 2024-08-20 RX ADMIN — SODIUM CHLORIDE, SODIUM LACTATE, POTASSIUM CHLORIDE, AND CALCIUM CHLORIDE: .6; .31; .03; .02 INJECTION, SOLUTION INTRAVENOUS at 10:08

## 2024-08-20 RX ADMIN — FENTANYL CITRATE 25 MCG: 50 INJECTION, SOLUTION INTRAMUSCULAR; INTRAVENOUS at 01:08

## 2024-08-20 RX ADMIN — FENTANYL CITRATE 75 MCG: 50 INJECTION, SOLUTION INTRAMUSCULAR; INTRAVENOUS at 10:08

## 2024-08-20 RX ADMIN — ROCURONIUM BROMIDE 20 MG: 10 INJECTION, SOLUTION INTRAVENOUS at 11:08

## 2024-08-20 RX ADMIN — MUPIROCIN: 20 OINTMENT TOPICAL at 09:08

## 2024-08-20 RX ADMIN — ROCURONIUM BROMIDE 50 MG: 10 INJECTION, SOLUTION INTRAVENOUS at 10:08

## 2024-08-20 RX ADMIN — CEFAZOLIN 2 G: 2 INJECTION, POWDER, FOR SOLUTION INTRAMUSCULAR; INTRAVENOUS at 10:08

## 2024-08-20 RX ADMIN — MIDAZOLAM HYDROCHLORIDE 2 MG: 1 INJECTION INTRAMUSCULAR; INTRAVENOUS at 10:08

## 2024-08-20 RX ADMIN — CEFAZOLIN 2 G: 2 INJECTION, POWDER, FOR SOLUTION INTRAMUSCULAR; INTRAVENOUS at 05:08

## 2024-08-20 RX ADMIN — LIDOCAINE HYDROCHLORIDE 100 MG: 20 INJECTION, SOLUTION INTRAVENOUS at 10:08

## 2024-08-20 RX ADMIN — Medication 6 MG: at 09:08

## 2024-08-20 NOTE — ASSESSMENT & PLAN NOTE
Chronic,  Latest blood pressure and vitals reviewed-     Temp:  [98.6 °F (37 °C)-98.8 °F (37.1 °C)]   Pulse:  [80-99]   Resp:  [18-20]   BP: ()/(55-95)   SpO2:  [97 %-100 %] .   Home meds for hypertension were reviewed and noted below.       While in the hospital, will manage blood pressure as follows; Adjust home antihypertensive regimen as follows- No home antihypertensives noted on home med list    Will utilize p.r.n. blood pressure medication only if patient's blood pressure greater than 180/110 and she develops symptoms such as worsening chest pain or shortness of breath.

## 2024-08-20 NOTE — ASSESSMENT & PLAN NOTE
"Ms. Akins is a 34 yr old female with a hx of bipolar 1 disorder, GERD, HTN, chronic back pain, irritable bowel syndrome who presents to the ED via EMS with an arm injury. Left humerus x-ray showed Single AP of the left humerus demonstrate mildly comminuted fracture of the distal femur.  The condylar region of the humerus appears to articulate with the proximal radius and ulna.  There are multiple foci of air seen at the distal soft tissues of the arm, which may be related to penetrating injury or laceration.  Recommend clinical correlation."CXR showed no acute intrathoracic abnormality.   Left forearm x-ray shows AP and lateral view of the left forearm demonstrates mildly comminuted fracture of the distal humeral condyle.  There are foci of air seen in the subcutaneous fat at the lower humerus and elbow.  Bones are normally mineralized."Left elbow x-ray shows Three views of the left elbow demonstrate mildly comminuted displaced fracture of the humerus.  Foci of air seen in the subcutaneous tissues of the elbow.  The bones are normally mineralized." CT arm elbow without contrast showed There is acute traumatic comminuted fracture of the distal humerus in the condylar region with intra-articular extension.  The radius and ulna visualized are intact.  There are multiple foci of air seen within the soft tissues about the elbow suggesting an open fracture."    - Orthopedic surgery consulted, appreciate recs  - Ortho recs from note this morning: "We will plan for an open reduction internal fixation with washout of the open fracture today of the left humerus. Patient understands that bone grafting may be appropriate. We signed the consents."  - NPO  - PRN analgesia  - Pt currently has left arm wrapped and splinted  "

## 2024-08-20 NOTE — ANESTHESIA PREPROCEDURE EVALUATION
08/20/2024  Patsy Akins is a 34 y.o., female.  To undergo Procedure(s) (LRB):  ORIF, FRACTURE, HUMERUS, DISTAL (Left)  WASHOUT (Left)       Past Medical History:  Past Medical History:   Diagnosis Date    Bipolar affective     Bipolar disorder with depression     Anxiety disorder    Chronic back pain     Hypokalemia     Irritable bowel syndrome        Past Surgical History:  No past surgical history on file.    Social History:  Social History     Socioeconomic History    Marital status: Single   Occupational History    Occupation: Disabled    Tobacco Use    Smoking status: Never    Smokeless tobacco: Never    Tobacco comments:     Don't like it.   Substance and Sexual Activity    Alcohol use: Not Currently     Alcohol/week: 0.0 standard drinks of alcohol     Comment: Don't drink.    Drug use: No    Sexual activity: Never   Social History Narrative    ** Merged History Encounter **         Single and no children.   On disability due to mental illness.      Social Determinants of Health     Financial Resource Strain: Medium Risk (3/23/2024)    Overall Financial Resource Strain (CARDIA)     Difficulty of Paying Living Expenses: Somewhat hard   Food Insecurity: Food Insecurity Present (3/23/2024)    Hunger Vital Sign     Worried About Running Out of Food in the Last Year: Sometimes true     Ran Out of Food in the Last Year: Never true   Transportation Needs: Unmet Transportation Needs (3/23/2024)    PRAPARE - Transportation     Lack of Transportation (Medical): Yes     Lack of Transportation (Non-Medical): Yes   Physical Activity: Insufficiently Active (3/23/2024)    Exercise Vital Sign     Days of Exercise per Week: 1 day     Minutes of Exercise per Session: 20 min   Stress: Stress Concern Present (3/23/2024)    [employer] - Occupational Stress Questionnaire     Feeling of  Stress : Very much   Housing Stability: Low Risk  (3/23/2024)    Housing Stability Vital Sign     Unable to Pay for Housing in the Last Year: No     Number of Places Lived in the Last Year: 1     Unstable Housing in the Last Year: No       Medications:  No current facility-administered medications on file prior to encounter.     Current Outpatient Medications on File Prior to Encounter   Medication Sig Dispense Refill    FLUoxetine 20 MG capsule Take 20 mg by mouth once daily.      FLUoxetine 40 MG capsule Take 40 mg by mouth once daily.      gabapentin (NEURONTIN) 600 MG tablet Take 600 mg by mouth 3 (three) times daily.      hydrOXYzine (ATARAX) 50 MG tablet Take 100 mg by mouth as needed.      melatonin (MELATIN) 5 mg Take by mouth.      omeprazole (PRILOSEC) 20 MG capsule TAKE 1 CAPSULE BY MOUTH DAILY 30 capsule 10    ondansetron (ZOFRAN-ODT) 4 MG TbDL Take 1 tablet (4 mg total) by mouth every 8 (eight) hours as needed (nausea/vomiting). 15 tablet 0    OXcarbazepine (TRILEPTAL) 300 MG Tab Take 0.5 tablets (150 mg total) by mouth 2 (two) times daily. (Patient taking differently: Take 300 mg by mouth 2 (two) times daily.) 180 tablet 2    QUEtiapine (SEROQUEL) 25 MG Tab Take by mouth 3 (three) times daily.      topiramate (TOPAMAX) 200 MG Tab TAKE 1 TABLET BY MOUTH IN THE EVENING 30 tablet 10       Allergies:  Review of patient's allergies indicates:   Allergen Reactions    Lamotrigine Hives and Itching       Active Problems:  Patient Active Problem List   Diagnosis    Bipolar 1 disorder, depressed    Gastroesophageal reflux disease without esophagitis    Essential hypertension    Chronic back pain    Chronic bilateral thoracic back pain    Arm weakness    Posture imbalance    Decreased strength of trunk and back    Decreased ROM of neck    Agitation    Altered mental status    Open bicondylar fracture of distal end of left humerus       Diagnostic Studies:   Latest Reference Range & Units 08/19/24 21:10   WBC 3.90  - 12.70 K/uL 10.28   RBC 4.00 - 5.40 M/uL 4.52   Hemoglobin 12.0 - 16.0 g/dL 10.3 (L)   Hematocrit 37.0 - 48.5 % 33.9 (L)   MCV 82 - 98 fL 75 (L)   MCH 27.0 - 31.0 pg 22.8 (L)   MCHC 32.0 - 36.0 g/dL 30.4 (L)   RDW 11.5 - 14.5 % 18.6 (H)   Platelet Count 150 - 450 K/uL 183   MPV 9.2 - 12.9 fL 8.1 (L)   Gran % 38.0 - 73.0 % 71.8   Lymph % 18.0 - 48.0 % 18.8   Mono % 4.0 - 15.0 % 7.8   Eos % 0.0 - 8.0 % 0.9   Basophil % 0.0 - 1.9 % 0.4   Immature Granulocytes 0.0 - 0.5 % 0.3   Gran # (ANC) 1.8 - 7.7 K/uL 7.4   Lymph # 1.0 - 4.8 K/uL 1.9   Mono # 0.3 - 1.0 K/uL 0.8   Eos # 0.0 - 0.5 K/uL 0.1   Baso # 0.00 - 0.20 K/uL 0.04   Immature Grans (Abs) 0.00 - 0.04 K/uL 0.03   nRBC 0 /100 WBC 0   Differential Method  Automated      Latest Reference Range & Units 08/19/24 21:10   Sodium 136 - 145 mmol/L 138   Potassium 3.5 - 5.1 mmol/L 3.1 (L)   Chloride 95 - 110 mmol/L 110   CO2 23 - 29 mmol/L 18 (L)   Anion Gap 8 - 16 mmol/L 10   BUN 6 - 20 mg/dL 18   Creatinine 0.5 - 1.4 mg/dL 0.8   eGFR >60 mL/min/1.73 m^2 >60   Glucose 70 - 110 mg/dL 95   Calcium 8.7 - 10.5 mg/dL 8.7   Magnesium  1.6 - 2.6 mg/dL 1.8   ALP 55 - 135 U/L 75   PROTEIN TOTAL 6.0 - 8.4 g/dL 6.6   Albumin 3.5 - 5.2 g/dL 3.7   BILIRUBIN TOTAL 0.1 - 1.0 mg/dL 0.1   AST 10 - 40 U/L 20   ALT 10 - 44 U/L 14     EKG (3/23/24):  Sinus tachycardia   Right atrial enlargement   Rightward axis   Nonspecific ST and T wave abnormality     TTE (9/27/21):  The left ventricle is normal in size with normal systolic function.  The estimated ejection fraction is 55%. QEF 57%.  The left ventricular global longitudinal strain is -19%.  Normal left ventricular diastolic function.  Normal right ventricular size with normal right ventricular systolic function.  Normal central venous pressure (3 mmHg).    24 Hour Vitals:  Temp:  [37 °C (98.6 °F)-37.1 °C (98.8 °F)] 37.1 °C (98.8 °F)  Pulse:  [80-99] 80  Resp:  [18-20] 20  SpO2:  [97 %-100 %] 97 %  BP: ()/(55-95) 96/55   See Nursing  Charting For Additional Vitals      Pre-op Assessment    I have reviewed the Patient Summary Reports.     I have reviewed the Nursing Notes.    I have reviewed the Medications.     Review of Systems  Social:  Non-Smoker, No Alcohol Use       Hematology/Oncology:       -- Anemia:                                  Cardiovascular:     Hypertension              ECG has been reviewed.                          Pulmonary:  Pulmonary Normal                       Hepatic/GI:     GERD   IBS          Musculoskeletal:     L humerus fracture            Neurological:  Neurology Normal                                      Endocrine:  Endocrine Normal            Psych:  Psychiatric History                  Physical Exam  General: Well nourished, Cooperative and Alert    Airway:  Mouth Opening: Normal  TM Distance: Normal    Dental:  Periodontal disease    Chest/Lungs:  Normal Respiratory Rate        Anesthesia Plan  Type of Anesthesia, risks & benefits discussed:    Anesthesia Type: Gen ETT  Intra-op Monitoring Plan: Standard ASA Monitors  Post Op Pain Control Plan: multimodal analgesia and peripheral nerve block  Induction:  IV  Informed Consent: Informed consent signed with the Patient and all parties understand the risks and agree with anesthesia plan.  All questions answered. Patient consented to blood products? Yes  ASA Score: 2    Ready For Surgery From Anesthesia Perspective.     .

## 2024-08-20 NOTE — ANESTHESIA POSTPROCEDURE EVALUATION
Anesthesia Post Evaluation    Patient: Patsy Akins    Procedure(s) Performed: Procedure(s) (LRB):  ORIF, FRACTURE, HUMERUS, DISTAL (Left)  WASHOUT (Left)    Final Anesthesia Type: general      Patient location during evaluation: PACU  Patient participation: Yes- Able to Participate  Level of consciousness: awake and alert  Post-procedure vital signs: reviewed and stable  Pain management: adequate  Airway patency: patent    PONV status at discharge: No PONV  Anesthetic complications: no      Cardiovascular status: hemodynamically stable  Respiratory status: unassisted and spontaneous ventilation  Hydration status: euvolemic  Follow-up not needed.              Vitals Value Taken Time   /70 08/20/24 1432   Temp 36.9 °C (98.4 °F) 08/20/24 1331   Pulse 86 08/20/24 1439   Resp 10 08/20/24 1439   SpO2 96 % 08/20/24 1439   Vitals shown include unfiled device data.      No case tracking events are documented in the log.      Pain/Leticia Score: Pain Rating Prior to Med Admin: 6 (8/20/2024  2:15 PM)  Pain Rating Post Med Admin: 6 (8/20/2024  2:15 PM)  Leticia Score: 9 (8/20/2024  2:00 PM)

## 2024-08-20 NOTE — TRANSFER OF CARE
"Anesthesia Transfer of Care Note    Patient: Patsy Akins    Procedure(s) Performed: Procedure(s) (LRB):  ORIF, FRACTURE, HUMERUS, DISTAL (Left)  WASHOUT (Left)    Patient location: PACU    Anesthesia Type: general    Transport from OR: Transported from OR on room air with adequate spontaneous ventilation    Post pain: adequate analgesia    Post assessment: no apparent anesthetic complications and tolerated procedure well    Post vital signs: stable    Level of consciousness: awake and alert    Nausea/Vomiting: no nausea/vomiting    Complications: none    Transfer of care protocol was followed      Last vitals: Visit Vitals  /70 (BP Location: Right arm, Patient Position: Lying)   Pulse 102   Temp 36.9 °C (98.4 °F) (Temporal)   Resp 18   Ht 5' 3" (1.6 m)   Wt 59.9 kg (132 lb)   SpO2 98%   Breastfeeding No   BMI 23.38 kg/m²     "

## 2024-08-20 NOTE — CARE UPDATE
Assumed care of patient. Seen post op. Pain controlled, arm in sling and sore. No other complaints. Reviewed op note. Plan start diet, PT consult, NWB BLANCA, and monitor.    Gina Merchant MD  08/20/2024 3:12 PM

## 2024-08-20 NOTE — PROGRESS NOTES
0922- Patient up to bathroom and voided 800ml. Ambulated back to bed. Assistance x 1 to get back into bed. Ordered medications given.     0938- Patient off unit via stretcher and accompanied by transport. Magnesium sulfate infusion paused and sent with patient.

## 2024-08-20 NOTE — CONSULTS
PARRISH distal humerus open fracture    HPI: Patsy Garrett34 y.o. complaining of left elbow pain status post a fall while attempting to roller blade.  She fell onto an outstretched arm.  She presented to the emergency room.  It was evident that she had a small grade 1 open fracture the distal humerus.  The fracture is comminuted and displaced intra-articular.  The patient has a history of bipolar disorder however has been stable on her medication.  We discussed with the patient about proceeding with an open reduction internal fixation of the left distal humerus fracture with washout of the open fracture as well.  She understands this.  We also discussed the possibility of using bone graft because of the amount of comminution to the distal humerus.    ROS:   Pertinent positives:  Left distal humerus fracture   Negatives: F/C, N/V, CP, SOB,    All other 14 point ROS negative    PMH:   Past Medical History:   Diagnosis Date    Bipolar affective     Bipolar disorder with depression     Anxiety disorder    Chronic back pain     Hypokalemia     Irritable bowel syndrome        PSH: No past surgical history on file.    Social Hx:   Social History     Occupational History    Occupation: Disabled    Tobacco Use    Smoking status: Never    Smokeless tobacco: Never    Tobacco comments:     Don't like it.   Substance and Sexual Activity    Alcohol use: Not Currently     Alcohol/week: 0.0 standard drinks of alcohol     Comment: Don't drink.    Drug use: No    Sexual activity: Never       Medications:    No current facility-administered medications on file prior to encounter.     Current Outpatient Medications on File Prior to Encounter   Medication Sig Dispense Refill    FLUoxetine 20 MG capsule Take 20 mg by mouth once daily.      FLUoxetine 40 MG capsule Take 40 mg by mouth once daily.      gabapentin (NEURONTIN) 600 MG tablet Take 600 mg by mouth 3 (three) times daily.      hydrOXYzine (ATARAX) 50 MG tablet Take 100 mg by  "mouth as needed.      melatonin (MELATIN) 5 mg Take by mouth.      omeprazole (PRILOSEC) 20 MG capsule TAKE 1 CAPSULE BY MOUTH DAILY 30 capsule 10    ondansetron (ZOFRAN-ODT) 4 MG TbDL Take 1 tablet (4 mg total) by mouth every 8 (eight) hours as needed (nausea/vomiting). 15 tablet 0    OXcarbazepine (TRILEPTAL) 300 MG Tab Take 0.5 tablets (150 mg total) by mouth 2 (two) times daily. (Patient taking differently: Take 300 mg by mouth 2 (two) times daily.) 180 tablet 2    QUEtiapine (SEROQUEL) 25 MG Tab Take by mouth 3 (three) times daily.      topiramate (TOPAMAX) 200 MG Tab TAKE 1 TABLET BY MOUTH IN THE EVENING 30 tablet 10    [DISCONTINUED] mirtazapine (REMERON) 15 MG tablet Take 15 mg by mouth every evening. (Patient not taking: Reported on 6/27/2024)      [DISCONTINUED] OLANZapine (ZYPREXA) 5 MG tablet Take 5 mg by mouth every evening. (Patient not taking: Reported on 6/27/2024)           PE:         Vitals:    08/20/24 0415   BP: (!) 96/55   Pulse: 80   Resp: 20   Temp: 98.8 °F (37.1 °C)       Estimated body mass index is 23.38 kg/m² as calculated from the following:    Height as of this encounter: 5' 3" (1.6 m).    Weight as of this encounter: 59.9 kg (132 lb).     General WDWN, NAD     Extremity:  Examination left upper extremity the patient is neurovascular intact distally.  She appears to have full sensation to the left hand and full motor to left hand.  There is pain on palpation around the left elbow.  She is in a splint.  She appears to be comfortable in the splint.    Labs:    Lab Results   Component Value Date    WBC 10.28 08/19/2024    HGB 10.3 (L) 08/19/2024    HCT 33.9 (L) 08/19/2024    MCV 75 (L) 08/19/2024     08/19/2024           BMP  Lab Results   Component Value Date     08/19/2024    K 3.1 (L) 08/19/2024     08/19/2024    CO2 18 (L) 08/19/2024    BUN 18 08/19/2024    CREATININE 0.8 08/19/2024    CALCIUM 8.7 08/19/2024    ANIONGAP 10 08/19/2024    EGFRNORACEVR >60 08/19/2024 " "      No results found for: "INR", "PROTIME"    No results found for: "SEDRATE"    No results found for: "CRP"    Radiography:  Film    Interpretation    X-rays as well as CT scan of the left elbow shows a displaced comminuted left distal humerus fracture.  There is a intra-articular split as well as a separate shaft piece with a large degree of displacement.    A/P  34 y.o.female with history of bipolar disorder attempting to roller skate suffered injury with a left distal humerus comminuted intra-articular fracture which is open grade 1.    We will plan for an open reduction internal fixation with washout of the open fracture today of the left humerus.  Patient understands that bone grafting may be appropriate.  We signed the consents.  The patient has been receiving IV antibiotics from the time of the ER and she also underwent cleaning of the wound in the emergency room as well.    Risks and complications were discussed including but not limited to the risks of anesthetic complications, infection, wound healing complications, bleeding, injury to nerve, vessels, anr or soft tissues. Need to repeat or perform future operations, instability, limb length inequality, rotational discrepancy, non-union, mal-union,  neurologic dysfunction including numbness, DVT, pulmonary embolism, myocardial infarction, stroke and death among others were discussed, and the patient elects to proceed.      Arnaud Simon MD    "

## 2024-08-20 NOTE — SUBJECTIVE & OBJECTIVE
Past Medical History:   Diagnosis Date    Bipolar affective     Bipolar disorder with depression     Anxiety disorder    Chronic back pain     Hypokalemia     Irritable bowel syndrome        No past surgical history on file.    Review of patient's allergies indicates:   Allergen Reactions    Lamotrigine Hives and Itching       No current facility-administered medications on file prior to encounter.     Current Outpatient Medications on File Prior to Encounter   Medication Sig    FLUoxetine 20 MG capsule Take 20 mg by mouth once daily.    FLUoxetine 40 MG capsule Take 40 mg by mouth once daily.    gabapentin (NEURONTIN) 600 MG tablet Take 600 mg by mouth 3 (three) times daily.    hydrOXYzine (ATARAX) 50 MG tablet Take 100 mg by mouth as needed.    melatonin (MELATIN) 5 mg Take by mouth.    omeprazole (PRILOSEC) 20 MG capsule TAKE 1 CAPSULE BY MOUTH DAILY    ondansetron (ZOFRAN-ODT) 4 MG TbDL Take 1 tablet (4 mg total) by mouth every 8 (eight) hours as needed (nausea/vomiting).    OXcarbazepine (TRILEPTAL) 300 MG Tab Take 0.5 tablets (150 mg total) by mouth 2 (two) times daily. (Patient taking differently: Take 300 mg by mouth 2 (two) times daily.)    QUEtiapine (SEROQUEL) 25 MG Tab Take by mouth 3 (three) times daily.    topiramate (TOPAMAX) 200 MG Tab TAKE 1 TABLET BY MOUTH IN THE EVENING    [DISCONTINUED] mirtazapine (REMERON) 15 MG tablet Take 15 mg by mouth every evening. (Patient not taking: Reported on 6/27/2024)    [DISCONTINUED] OLANZapine (ZYPREXA) 5 MG tablet Take 5 mg by mouth every evening. (Patient not taking: Reported on 6/27/2024)     Family History       Problem Relation (Age of Onset)    Alcohol abuse Mother, Father, Maternal Grandmother, Maternal Aunt    Arthritis Maternal Grandmother    Asthma Brother    Depression Mother, Father, Brother, Paternal Grandmother    Diabetes Paternal Grandmother    Learning disabilities Brother    Mental illness Mother, Brother, Paternal Aunt    Miscarriages /  Stillbirths Paternal Aunt    Stroke Maternal Grandmother, Paternal Grandmother, Paternal Grandfather          Tobacco Use    Smoking status: Never    Smokeless tobacco: Never    Tobacco comments:     Don't like it.   Substance and Sexual Activity    Alcohol use: Not Currently     Alcohol/week: 0.0 standard drinks of alcohol     Comment: Don't drink.    Drug use: No    Sexual activity: Never     Review of Systems   Constitutional:  Negative for chills and fever.   Respiratory:  Negative for shortness of breath.    Cardiovascular:  Negative for chest pain.   Gastrointestinal:  Negative for diarrhea, nausea and vomiting.   Genitourinary:  Negative for dysuria and hematuria.   Musculoskeletal:  Positive for arthralgias (left elbow) and myalgias.   Neurological:  Positive for light-headedness. Negative for dizziness and syncope.     Objective:     Vital Signs (Most Recent):  Temp: 98.8 °F (37.1 °C) (08/20/24 0415)  Pulse: 80 (08/20/24 0415)  Resp: 20 (08/20/24 0415)  BP: (!) 96/55 (08/20/24 0415)  SpO2: 97 % (08/20/24 0415) Vital Signs (24h Range):  Temp:  [98.6 °F (37 °C)-98.8 °F (37.1 °C)] 98.8 °F (37.1 °C)  Pulse:  [80-99] 80  Resp:  [18-20] 20  SpO2:  [97 %-100 %] 97 %  BP: ()/(55-95) 96/55     Weight: 59.9 kg (132 lb)  Body mass index is 23.38 kg/m².     Physical Exam  Vitals reviewed.   Constitutional:       General: She is awake. She is not in acute distress.     Appearance: Normal appearance. She is not ill-appearing or diaphoretic.   Cardiovascular:      Rate and Rhythm: Normal rate.      Heart sounds: No murmur heard.     No friction rub. No gallop.   Pulmonary:      Effort: Pulmonary effort is normal. No accessory muscle usage or respiratory distress.      Breath sounds: Normal breath sounds. No wheezing, rhonchi or rales.   Abdominal:      General: Abdomen is flat. Bowel sounds are normal.      Palpations: Abdomen is soft.      Tenderness: There is no abdominal tenderness. There is no guarding or  rebound.   Musculoskeletal:      Right lower leg: No edema.      Left lower leg: No edema.      Comments: Pt's left arm is wrapped and splinted. Pt endorses significant pain with little movement of her arm. Sensation intact to left hand. Good drip strength. Cap refill <2 seconds. Left hand warm to touch. 2+ radial pulse to left hand.   Skin:     General: Skin is warm and dry.   Neurological:      Mental Status: She is alert and oriented to person, place, and time.   Psychiatric:         Behavior: Behavior is cooperative.                Significant Labs: All pertinent labs within the past 24 hours have been reviewed.  CBC:   Recent Labs   Lab 08/19/24 2110   WBC 10.28   HGB 10.3*   HCT 33.9*        CMP:   Recent Labs   Lab 08/19/24 2110      K 3.1*      CO2 18*   GLU 95   BUN 18   CREATININE 0.8   CALCIUM 8.7   PROT 6.6   ALBUMIN 3.7   BILITOT 0.1   ALKPHOS 75   AST 20   ALT 14   ANIONGAP 10     Magnesium:   Recent Labs   Lab 08/19/24 2110   MG 1.8       Significant Imaging:   Imaging Results              CT Arm Elbow Without Contrast Left (Final result)  Result time 08/20/24 00:09:07      Final result by Chacha Krueger MD (08/20/24 00:09:07)                   Impression:      As above described.      Electronically signed by: Chacha Krueger  Date:    08/20/2024  Time:    00:09               Narrative:    EXAMINATION:  CT ARM ELBOW WITHOUT CONTRAST LEFT    CLINICAL HISTORY:  Fracture, elbow;evaluate for intraarticular extension;    TECHNIQUE:  0.625 mm axial images obtained through the left elbow.  Coronal sagittal reformatted images were provided.    COMPARISON:  None.    FINDINGS:  There is acute traumatic comminuted fracture of the distal humerus in the condylar region with intra-articular extension.  The radius and ulna visualized are intact.  There are multiple foci of air seen within the soft tissues about the elbow suggesting an open fracture.                                        X-Ray Chest 1 View (Final result)  Result time 08/19/24 22:44:30      Final result by Chacha Krueger MD (08/19/24 22:44:30)                   Impression:      No acute intrathoracic abnormality.      Electronically signed by: Chacha Krueger  Date:    08/19/2024  Time:    22:44               Narrative:    EXAMINATION:  CHEST ONE VIEW    CLINICAL HISTORY:  Pain in left arm    TECHNIQUE:  One view of the chest.    COMPARISON:  03/11/2024    FINDINGS:  The cardiac silhouette is within normal limits.   There is no focal consolidation, pneumothorax, or pleural effusion. Mild dextroscoliosis is present.                                       X-Ray Elbow Complete Left (Final result)  Result time 08/19/24 23:11:23      Final result by Chacha Krueger MD (08/19/24 23:11:23)                   Impression:      As above described.      Electronically signed by: Chacha Krueger  Date:    08/19/2024  Time:    23:11               Narrative:    EXAMINATION:  THREE VIEWS OF THE LEFT ELBOW    CLINICAL HISTORY:  Pain.    TECHNIQUE:  AP, oblique, and lateral view of the left elbow    COMPARISON:  Left humerus 08/19/2024    FINDINGS:  Please see report for left elbow radiograph performed same date.                                       X-Ray Forearm Left (Final result)  Result time 08/19/24 22:58:18      Final result by Chacha Krueger MD (08/19/24 22:58:18)                   Impression:      As above described.      Electronically signed by: Chacha Krueger  Date:    08/19/2024  Time:    22:58               Narrative:    EXAMINATION:  TWO VIEW FOREARM    CLINICAL HISTORY:  Pain in left arm    TECHNIQUE:  AP and lateral views of the left forearm    COMPARISON:  None.    FINDINGS:  AP and lateral view of the left forearm demonstrates mildly comminuted fracture of the distal humeral condyle.  There are foci of air seen in the subcutaneous fat at the lower humerus and elbow.  Bones are normally mineralized.    Three views of  the left elbow demonstrate mildly comminuted displaced fracture of the humerus.  Foci of air seen in the subcutaneous tissues of the elbow.  The bones are normally mineralized.                                       X-Ray Humerus 2 View Left (Final result)  Result time 08/19/24 20:43:28      Final result by Chacha Krueger MD (08/19/24 20:43:28)                   Impression:      As above described.      Electronically signed by: Chacha Krueger  Date:    08/19/2024  Time:    20:43               Narrative:    EXAMINATION:  TWO VIEWS OF THE LEFT HUMERUS    CLINICAL HISTORY:  Pain in arm, unspecified    TECHNIQUE:  AP and lateral view of the left humerus    COMPARISON:  None.    FINDINGS:  Single AP of the left humerus demonstrate mildly comminuted fracture of the distal femur.  The condylar region of the humerus appears to articulate with the proximal radius and ulna.  There are multiple foci of air seen at the distal soft tissues of the arm, which may be related to penetrating injury or laceration.  Recommend clinical correlation.

## 2024-08-20 NOTE — PLAN OF CARE
Problem: Adult Inpatient Plan of Care  Goal: Plan of Care Review  Outcome: Progressing  Goal: Patient-Specific Goal (Individualized)  Outcome: Progressing  Goal: Absence of Hospital-Acquired Illness or Injury  Outcome: Progressing     Problem: Wound  Goal: Optimal Coping  Outcome: Progressing  Goal: Optimal Functional Ability  Outcome: Progressing  Goal: Absence of Infection Signs and Symptoms  Outcome: Progressing  Goal: Improved Oral Intake  Outcome: Progressing  Goal: Optimal Pain Control and Function  Outcome: Progressing  Goal: Skin Health and Integrity  Outcome: Progressing  Goal: Optimal Wound Healing  Outcome: Progressing     Problem: Fall Injury Risk  Goal: Absence of Fall and Fall-Related Injury  Outcome: Progressing

## 2024-08-20 NOTE — ANESTHESIA PROCEDURE NOTES
Intubation    Date/Time: 8/20/2024 10:57 AM    Performed by: Dania Oh CRNA  Authorized by: Charo Mckeon MD    Intubation:     Induction:  Intravenous    Intubated:  Postinduction    Mask Ventilation:  Easy mask    Attempts:  1    Attempted By:  CRNA    Method of Intubation:  Video laryngoscopy    Blade:  Faria 3    Laryngeal View Grade: Grade I - full view of cords      Difficult Airway Encountered?: No      Complications:  None    Airway Device:  Oral endotracheal tube    Airway Device Size:  7.0    Style/Cuff Inflation:  Cuffed (inflated to minimal occlusive pressure)    Inflation Amount (mL):  5    Tube secured:  21    Secured at:  The lips    Placement Verified By:  Capnometry    Complicating Factors:  None    Findings Post-Intubation:  Atraumatic/condition of teeth unchanged and BS equal bilateral

## 2024-08-20 NOTE — H&P
[facility] Medicine  History & Physical    Patient Name: Patsy Akins  MRN: [id number]  Patient Class: OP- Observation  Admission Date: 8/19/2024  Attending Physician: Pino Hilario MD   Primary Care Provider: Ghanshyam Ramos MD         Patient information was obtained from patient, past medical records, and ER records.     Subjective:     Principal Problem:Open bicondylar fracture of distal end of left humerus    Chief Complaint:   Chief Complaint   Patient presents with    Arm Injury     Pt arrived via ems, pt chief complaint is Arm Injury. Pt was roller skating and fell fracturing left arm.          HPI: Ms. Akins is a 34 yr old female with a hx of bipolar 1 disorder, GERD, HTN, chronic back pain, irritable bowel syndrome who presents to the ED via EMS with an arm injury.  Patient endorses that around 6-6:30 p.m. she was roller-skating and was going down a ramp when she fell.  Patient endorses sticking her hand out to catch herself and falling onto her hand and arm.  She is unsure how she landed.  She endorses looking at the injury and knowing right away that she had fractured her arm.  She endorses throbbing pain to the area since.  She also endorses lightheadedness.  She denies tobacco, alcohol, and recreational drug use.  She denies fever, chills, SOB, CP, nausea, vomiting, diarrhea, dysuria, hematuria, dizziness, and syncope.    Upon arrival to ED, patient afebrile, HR 96, RR of 18, BP of 100/60, satting 98% on RA.  Workup in the ED included CBC, CMP, magnesium, hCG, type and screen, CXR, CT arm elbow left, left humerus x-ray, left elbow x-ray, left forearm x-ray.  Workup revealed H/H of 10.3/33.9, potassium of 3.1, CO2 of 18.  Left humerus x-ray showed Single AP of the left humerus demonstrate mildly comminuted fracture of the distal femur.  The condylar region of the humerus appears to articulate with the proximal radius and ulna.  There are multiple foci of air seen at the  "distal soft tissues of the arm, which may be related to penetrating injury or laceration.  Recommend clinical correlation."CXR showed no acute intrathoracic abnormality.   Left forearm x-ray shows AP and lateral view of the left forearm demonstrates mildly comminuted fracture of the distal humeral condyle.  There are foci of air seen in the subcutaneous fat at the lower humerus and elbow.  Bones are normally mineralized."Left elbow x-ray shows Three views of the left elbow demonstrate mildly comminuted displaced fracture of the humerus.  Foci of air seen in the subcutaneous tissues of the elbow.  The bones are normally mineralized." CT arm elbow without contrast showed There is acute traumatic comminuted fracture of the distal humerus in the condylar region with intra-articular extension.  The radius and ulna visualized are intact.  There are multiple foci of air seen within the soft tissues about the elbow suggesting an open fracture.".  Patient was given cefazolin 1 g IV x2, gabapentin 300 mg oral, morphine 4 mg IV, mupirocin ointment, potassium bicarb 50 mEq oral, quetiapine 25 mg oral, 1 L NS bolus, lorazepam 0.5 mg IV while in the ED. case discussed with ED provider and patient will be placed under observation for further management.    Past Medical History:   Diagnosis Date    Bipolar affective     Bipolar disorder with depression     Anxiety disorder    Chronic back pain     Hypokalemia     Irritable bowel syndrome        No past surgical history on file.    Review of patient's allergies indicates:   Allergen Reactions    Lamotrigine Hives and Itching       No current facility-administered medications on file prior to encounter.     Current Outpatient Medications on File Prior to Encounter   Medication Sig    FLUoxetine 20 MG capsule Take 20 mg by mouth once daily.    FLUoxetine 40 MG capsule Take 40 mg by mouth once daily.    gabapentin (NEURONTIN) 600 MG tablet Take 600 mg by mouth 3 (three) times " daily.    hydrOXYzine (ATARAX) 50 MG tablet Take 100 mg by mouth as needed.    melatonin (MELATIN) 5 mg Take by mouth.    omeprazole (PRILOSEC) 20 MG capsule TAKE 1 CAPSULE BY MOUTH DAILY    ondansetron (ZOFRAN-ODT) 4 MG TbDL Take 1 tablet (4 mg total) by mouth every 8 (eight) hours as needed (nausea/vomiting).    OXcarbazepine (TRILEPTAL) 300 MG Tab Take 0.5 tablets (150 mg total) by mouth 2 (two) times daily. (Patient taking differently: Take 300 mg by mouth 2 (two) times daily.)    QUEtiapine (SEROQUEL) 25 MG Tab Take by mouth 3 (three) times daily.    topiramate (TOPAMAX) 200 MG Tab TAKE 1 TABLET BY MOUTH IN THE EVENING    [DISCONTINUED] mirtazapine (REMERON) 15 MG tablet Take 15 mg by mouth every evening. (Patient not taking: Reported on 6/27/2024)    [DISCONTINUED] OLANZapine (ZYPREXA) 5 MG tablet Take 5 mg by mouth every evening. (Patient not taking: Reported on 6/27/2024)     Family History       Problem Relation (Age of Onset)    Alcohol abuse Mother, Father, Maternal Grandmother, Maternal Aunt    Arthritis Maternal Grandmother    Asthma Brother    Depression Mother, Father, Brother, Paternal Grandmother    Diabetes Paternal Grandmother    Learning disabilities Brother    Mental illness Mother, Brother, Paternal Aunt    Miscarriages / Stillbirths Paternal Aunt    Stroke Maternal Grandmother, Paternal Grandmother, Paternal Grandfather          Tobacco Use    Smoking status: Never    Smokeless tobacco: Never    Tobacco comments:     Don't like it.   Substance and Sexual Activity    Alcohol use: Not Currently     Alcohol/week: 0.0 standard drinks of alcohol     Comment: Don't drink.    Drug use: No    Sexual activity: Never     Review of Systems   Constitutional:  Negative for chills and fever.   Respiratory:  Negative for shortness of breath.    Cardiovascular:  Negative for chest pain.   Gastrointestinal:  Negative for diarrhea, nausea and vomiting.   Genitourinary:  Negative for dysuria and hematuria.    Musculoskeletal:  Positive for arthralgias (left elbow) and myalgias.   Neurological:  Positive for light-headedness. Negative for dizziness and syncope.     Objective:     Vital Signs (Most Recent):  Temp: 98.8 °F (37.1 °C) (08/20/24 0415)  Pulse: 80 (08/20/24 0415)  Resp: 20 (08/20/24 0415)  BP: (!) 96/55 (08/20/24 0415)  SpO2: 97 % (08/20/24 0415) Vital Signs (24h Range):  Temp:  [98.6 °F (37 °C)-98.8 °F (37.1 °C)] 98.8 °F (37.1 °C)  Pulse:  [80-99] 80  Resp:  [18-20] 20  SpO2:  [97 %-100 %] 97 %  BP: ()/(55-95) 96/55     Weight: 59.9 kg (132 lb)  Body mass index is 23.38 kg/m².     Physical Exam  Vitals reviewed.   Constitutional:       General: She is awake. She is not in acute distress.     Appearance: Normal appearance. She is not ill-appearing or diaphoretic.   Cardiovascular:      Rate and Rhythm: Normal rate.      Heart sounds: No murmur heard.     No friction rub. No gallop.   Pulmonary:      Effort: Pulmonary effort is normal. No accessory muscle usage or respiratory distress.      Breath sounds: Normal breath sounds. No wheezing, rhonchi or rales.   Abdominal:      General: Abdomen is flat. Bowel sounds are normal.      Palpations: Abdomen is soft.      Tenderness: There is no abdominal tenderness. There is no guarding or rebound.   Musculoskeletal:      Right lower leg: No edema.      Left lower leg: No edema.      Comments: Pt's left arm is wrapped and splinted. Pt endorses significant pain with little movement of her arm. Sensation intact to left hand. Good drip strength. Cap refill <2 seconds. Left hand warm to touch. 2+ radial pulse to left hand.   Skin:     General: Skin is warm and dry.   Neurological:      Mental Status: She is alert and oriented to person, place, and time.   Psychiatric:         Behavior: Behavior is cooperative.                Significant Labs: All pertinent labs within the past 24 hours have been reviewed.  CBC:   Recent Labs   Lab 08/19/24  2110   WBC 10.28   HGB  10.3*   HCT 33.9*        CMP:   Recent Labs   Lab 08/19/24  2110      K 3.1*      CO2 18*   GLU 95   BUN 18   CREATININE 0.8   CALCIUM 8.7   PROT 6.6   ALBUMIN 3.7   BILITOT 0.1   ALKPHOS 75   AST 20   ALT 14   ANIONGAP 10     Magnesium:   Recent Labs   Lab 08/19/24  2110   MG 1.8       Significant Imaging:   Imaging Results              CT Arm Elbow Without Contrast Left (Final result)  Result time 08/20/24 00:09:07      Final result by Chacha Krueger MD (08/20/24 00:09:07)                   Impression:      As above described.      Electronically signed by: Chacha Krueger  Date:    08/20/2024  Time:    00:09               Narrative:    EXAMINATION:  CT ARM ELBOW WITHOUT CONTRAST LEFT    CLINICAL HISTORY:  Fracture, elbow;evaluate for intraarticular extension;    TECHNIQUE:  0.625 mm axial images obtained through the left elbow.  Coronal sagittal reformatted images were provided.    COMPARISON:  None.    FINDINGS:  There is acute traumatic comminuted fracture of the distal humerus in the condylar region with intra-articular extension.  The radius and ulna visualized are intact.  There are multiple foci of air seen within the soft tissues about the elbow suggesting an open fracture.                                       X-Ray Chest 1 View (Final result)  Result time 08/19/24 22:44:30      Final result by Chacha Krueger MD (08/19/24 22:44:30)                   Impression:      No acute intrathoracic abnormality.      Electronically signed by: Chacha Krueger  Date:    08/19/2024  Time:    22:44               Narrative:    EXAMINATION:  CHEST ONE VIEW    CLINICAL HISTORY:  Pain in left arm    TECHNIQUE:  One view of the chest.    COMPARISON:  03/11/2024    FINDINGS:  The cardiac silhouette is within normal limits.   There is no focal consolidation, pneumothorax, or pleural effusion. Mild dextroscoliosis is present.                                       X-Ray Elbow Complete Left (Final  result)  Result time 08/19/24 23:11:23      Final result by Chacha Krueger MD (08/19/24 23:11:23)                   Impression:      As above described.      Electronically signed by: Chacha Krueger  Date:    08/19/2024  Time:    23:11               Narrative:    EXAMINATION:  THREE VIEWS OF THE LEFT ELBOW    CLINICAL HISTORY:  Pain.    TECHNIQUE:  AP, oblique, and lateral view of the left elbow    COMPARISON:  Left humerus 08/19/2024    FINDINGS:  Please see report for left elbow radiograph performed same date.                                       X-Ray Forearm Left (Final result)  Result time 08/19/24 22:58:18      Final result by Chacha Krueger MD (08/19/24 22:58:18)                   Impression:      As above described.      Electronically signed by: Chacha Krueger  Date:    08/19/2024  Time:    22:58               Narrative:    EXAMINATION:  TWO VIEW FOREARM    CLINICAL HISTORY:  Pain in left arm    TECHNIQUE:  AP and lateral views of the left forearm    COMPARISON:  None.    FINDINGS:  AP and lateral view of the left forearm demonstrates mildly comminuted fracture of the distal humeral condyle.  There are foci of air seen in the subcutaneous fat at the lower humerus and elbow.  Bones are normally mineralized.    Three views of the left elbow demonstrate mildly comminuted displaced fracture of the humerus.  Foci of air seen in the subcutaneous tissues of the elbow.  The bones are normally mineralized.                                       X-Ray Humerus 2 View Left (Final result)  Result time 08/19/24 20:43:28      Final result by Chacha Krueger MD (08/19/24 20:43:28)                   Impression:      As above described.      Electronically signed by: Chacha Krueger  Date:    08/19/2024  Time:    20:43               Narrative:    EXAMINATION:  TWO VIEWS OF THE LEFT HUMERUS    CLINICAL HISTORY:  Pain in arm, unspecified    TECHNIQUE:  AP and lateral view of the left  "humerus    COMPARISON:  None.    FINDINGS:  Single AP of the left humerus demonstrate mildly comminuted fracture of the distal femur.  The condylar region of the humerus appears to articulate with the proximal radius and ulna.  There are multiple foci of air seen at the distal soft tissues of the arm, which may be related to penetrating injury or laceration.  Recommend clinical correlation.                                     Assessment/Plan:     * Open bicondylar fracture of distal end of left humerus  Ms. Akins is a 34 yr old female with a hx of bipolar 1 disorder, GERD, HTN, chronic back pain, irritable bowel syndrome who presents to the ED via EMS with an arm injury. Left humerus x-ray showed Single AP of the left humerus demonstrate mildly comminuted fracture of the distal femur.  The condylar region of the humerus appears to articulate with the proximal radius and ulna.  There are multiple foci of air seen at the distal soft tissues of the arm, which may be related to penetrating injury or laceration.  Recommend clinical correlation."CXR showed no acute intrathoracic abnormality.   Left forearm x-ray shows AP and lateral view of the left forearm demonstrates mildly comminuted fracture of the distal humeral condyle.  There are foci of air seen in the subcutaneous fat at the lower humerus and elbow.  Bones are normally mineralized."Left elbow x-ray shows Three views of the left elbow demonstrate mildly comminuted displaced fracture of the humerus.  Foci of air seen in the subcutaneous tissues of the elbow.  The bones are normally mineralized." CT arm elbow without contrast showed There is acute traumatic comminuted fracture of the distal humerus in the condylar region with intra-articular extension.  The radius and ulna visualized are intact.  There are multiple foci of air seen within the soft tissues about the elbow suggesting an open fracture."    - Orthopedic surgery consulted, appreciate recs  - " "Ortho recs from note this morning: "We will plan for an open reduction internal fixation with washout of the open fracture today of the left humerus. Patient understands that bone grafting may be appropriate. We signed the consents."  - NPO  - PRN analgesia  - Pt currently has left arm wrapped and splinted    Essential hypertension  Chronic,  Latest blood pressure and vitals reviewed-     Temp:  [98.6 °F (37 °C)-98.8 °F (37.1 °C)]   Pulse:  [80-99]   Resp:  [18-20]   BP: ()/(55-95)   SpO2:  [97 %-100 %] .   Home meds for hypertension were reviewed and noted below.       While in the hospital, will manage blood pressure as follows; Adjust home antihypertensive regimen as follows- No home antihypertensives noted on home med list    Will utilize p.r.n. blood pressure medication only if patient's blood pressure greater than 180/110 and she develops symptoms such as worsening chest pain or shortness of breath.    Bipolar 1 disorder, depressed  - Continue home Fluoxetine, trileptal, seroquel, and topamax        VTE Risk Mitigation (From admission, onward)           Ordered     Reason for No Pharmacological VTE Prophylaxis  Once        Question:  Reasons:  Answer:  Risk of Bleeding    08/19/24 2308     IP VTE HIGH RISK PATIENT  Once         08/19/24 2308     Place sequential compression device  Until discontinued         08/19/24 2308                         On 08/20/2024, patient should be placed in hospital observation services under my care in collaboration with Pino Hilario MD.           RENE TitusC  Department of [facility] Medicine  [facility]          "

## 2024-08-20 NOTE — BRIEF OP NOTE
[facility] [place] - Surgery  Brief Operative Note    SUMMARY     Surgery Date: 8/20/2024     Surgeons and Role:     * Arnaud Simon MD - Primary    Assisting Surgeon: None    Pre-op Diagnosis:  Humerus fracture [S42.309A]    Post-op Diagnosis:  Post-Op Diagnosis Codes:     * Humerus fracture [S42.309A]    Procedure(s) (LRB):  ORIF, FRACTURE, HUMERUS, DISTAL (Left)  WASHOUT (Left)    Anesthesia: Choice    Implants:  * No implants in log *    Operative Findings:  Left open distal humerus fracture comminuted intra-articular displaced    Estimated Blood Loss: * No values recorded between 8/20/2024 11:20 AM and 8/20/2024 12:41 PM *    50 cc    Estimated Blood Loss has been documented.         Specimens:   Specimen (24h ago, onward)      None            [id number]

## 2024-08-20 NOTE — HPI
"Ms. Akins is a 34 yr old female with a hx of bipolar 1 disorder, GERD, HTN, chronic back pain, irritable bowel syndrome who presents to the ED via EMS with an arm injury.  Patient endorses that around 6-6:30 p.m. she was roller-skating and was going down a ramp when she fell.  Patient endorses sticking her hand out to catch herself and falling onto her hand and arm.  She is unsure how she landed.  She endorses looking at the injury and knowing right away that she had fractured her arm.  She endorses throbbing pain to the area since.  She also endorses lightheadedness.  She denies tobacco, alcohol, and recreational drug use.  She denies fever, chills, SOB, CP, nausea, vomiting, diarrhea, dysuria, hematuria, dizziness, and syncope.    Upon arrival to ED, patient afebrile, HR 96, RR of 18, BP of 100/60, satting 98% on RA.  Workup in the ED included CBC, CMP, magnesium, hCG, type and screen, CXR, CT arm elbow left, left humerus x-ray, left elbow x-ray, left forearm x-ray.  Workup revealed H/H of 10.3/33.9, potassium of 3.1, CO2 of 18.  Left humerus x-ray showed Single AP of the left humerus demonstrate mildly comminuted fracture of the distal femur.  The condylar region of the humerus appears to articulate with the proximal radius and ulna.  There are multiple foci of air seen at the distal soft tissues of the arm, which may be related to penetrating injury or laceration.  Recommend clinical correlation."CXR showed no acute intrathoracic abnormality.   Left forearm x-ray shows AP and lateral view of the left forearm demonstrates mildly comminuted fracture of the distal humeral condyle.  There are foci of air seen in the subcutaneous fat at the lower humerus and elbow.  Bones are normally mineralized."Left elbow x-ray shows Three views of the left elbow demonstrate mildly comminuted displaced fracture of the humerus.  Foci of air seen in the subcutaneous tissues of the elbow.  The bones are normally mineralized." " "CT arm elbow without contrast showed There is acute traumatic comminuted fracture of the distal humerus in the condylar region with intra-articular extension.  The radius and ulna visualized are intact.  There are multiple foci of air seen within the soft tissues about the elbow suggesting an open fracture.".  Patient was given cefazolin 1 g IV x2, gabapentin 300 mg oral, morphine 4 mg IV, mupirocin ointment, potassium bicarb 50 mEq oral, quetiapine 25 mg oral, 1 L NS bolus, lorazepam 0.5 mg IV while in the ED. case discussed with ED provider and patient will be placed under observation for further management.  "

## 2024-08-20 NOTE — OP NOTE
OPERATIVE REPORT    DATE OF DICTATION: 08/20/2024    DATE OF OPERATION: 08/20/2024    SURGEON: MIMA SCHAFER M.D.    ASSISTANT: Stephanie Goldsmith    PRE-OPERATIVE DIAGNOSIS:    Left distal humerus open comminuted fracture    POST-OPERATIVE DIAGNOSIS:    Same    PROCEDURE PERFORMED:    Open reduction internal fixation left distal humerus fracture intra-articular comminuted displaced  Washout open fracture with debridement  Neurolysis ulnar nerve    ANESTHESIA: General    FINDINGS:  Open fracture left distal humerus    BLOOD LOSS:  50 cc        IMPLANTS:  Four hole medial plate distal humerus, 3 hole posterolateral plate    COMPLICATIONS: none      HPI: The patient is a 34 y.o. y/o female suffering from open left distal humerus fracture.  Patient has been receiving IV antibiotics were taking her for washout of the open fracture with open reduction internal fixation.      PROCEDURE IN DETAIL: After the appropriate consents were signed discussing possible risks and complications of the surgery including but not limited to wound healing complications such as skin breakdown and infection, as well as injuries to nerves, soft tissue, and vessels in the area of the surgery, as well as non-union or mal-union at the fracture site, as well as new fractures, as well as needing to repeat or perform future operations, limb length discrepancy, limb rotational discrepancy, and other anesthesia related complications such as heart attack, stroke, deep vein thrombosis, pulmonary embolism, and  Death. After these consents were signed and the patient was marked in the holding area, the patient was brought back to the operating room and placed under anesthesia. The patient was transferred onto the operative table and placed in the prone position. All bony prominences and neurovascualr structures were well padded The operative limb was prepped and draped in the normal sterile standard fashion. IV antibiotics had been  infused. A time out was called including the scrub tech, the circulating nurse, the anesthesia staff, and the surgical staff.         After appropriate draping made a posterior incision.  We released both medial and lateral care was taken not to injure the ulnar nerve.  We dissected out the ulnar nerve performed a neural lysis in order to mobilize this.  We initially reduce the lateral side placing a posterior lateral plate and holding this in place with K-wires as well as clamps were fixation.  We worked around the triceps.  Once this was stable we then came to the medial side and compress the distal aspect holding this in place with K-wires again there was a split butterfly fragment which we had to reduce back once the ulnar nerve had been released were able to slide a medial plate around the medial epicondyle and hold this in place again with K-wires we placed proximal cortical screws again compressing this as well as distal locking screws.  We used the approach withdrawal technique on the x-rays in order to ensure there was no prominent hardware.  Were happy with the fixation with a range of motion.  There was no impingement.  We did place a g of vancomycin we had irrigated and debrided the open fracture site which is a small grade 1 focal over the lateral aspect of the humerus.  This was debrided prior to the open reduction internal fixation.  We then closed the deep tissue with 1. Vicryl sutures, subcutaneous tissue with 2-0 Vicryl sutures and the skin with nylon sutures.  Sterile dressing was applied to the wound the patient was woken from anesthesia without complications.  All counts correct     Postop care for the patient she is nonweightbearing to the left upper extremity.  We will begin physical therapy thank you

## 2024-08-20 NOTE — ED PROVIDER NOTES
Encounter Date: 8/19/2024       History     Chief Complaint   Patient presents with    Arm Injury     Pt arrived via ems, pt chief complaint is Arm Injury. Pt was roller skating and fell fracturing left arm.       The history is provided by the patient and medical records.   34-year-old female with past medical history of bipolar presenting to the emergency department today via EMS for evaluation of a arm injury x1 hour.  Patient states she was roller-skating when she lost her balance falling forward landing on her left arm outstretched.  Patient's AMS noted deformity of the left elbow.  Patient's arm was splinted prior to arrival.  Patient was given fentanyl for pain prior to arrival with her pain 5/10.  Patient states she has motion of her hand and wrist however is too painful to move her elbow.  No other complaints at this time.  Denies any extremity paresthesias, head injury, loss of consciousness, neck or back pain.  Review of patient's allergies indicates:   Allergen Reactions    Lamotrigine Hives and Itching     Past Medical History:   Diagnosis Date    Bipolar affective     Bipolar disorder with depression     Anxiety disorder    Chronic back pain     Hypokalemia     Irritable bowel syndrome      No past surgical history on file.  Family History   Problem Relation Name Age of Onset    Mental illness Mother Zeynep     Alcohol abuse Mother Zeynep     Depression Mother Zeynep     Alcohol abuse Father Jay     Depression Father Jay     Asthma Brother [place]     Depression Brother Luigi     Learning disabilities Brother Luigi     Mental illness Brother [place]     Alcohol abuse Maternal Grandmother Mesilla     Arthritis Maternal Grandmother Mesilla     Stroke Maternal Grandmother Joann     Depression Paternal Grandmother Darlene     Diabetes Paternal Grandmother Darlene     Stroke Paternal Grandmother Darlene     Stroke Paternal Grandfather Rony     Alcohol abuse Maternal Aunt Marjorie     Mental illness Paternal  Aunt Hilda     Miscarriages / Stillbirths Paternal Aunt Yvrose     Breast cancer Neg Hx      Colon cancer Neg Hx      Ovarian cancer Neg Hx       Social History     Tobacco Use    Smoking status: Never    Smokeless tobacco: Never    Tobacco comments:     Don't like it.   Substance Use Topics    Alcohol use: Not Currently     Alcohol/week: 0.0 standard drinks of alcohol     Comment: Don't drink.    Drug use: No     Review of Systems   Constitutional:  Negative for chills, diaphoresis, fatigue and fever.   HENT:  Negative for congestion, rhinorrhea and sore throat.    Eyes:  Negative for redness and visual disturbance.   Respiratory:  Negative for cough and shortness of breath.    Cardiovascular:  Negative for chest pain, palpitations and leg swelling.   Gastrointestinal:  Negative for abdominal pain, diarrhea, nausea and vomiting.   Genitourinary:  Negative for difficulty urinating, dysuria, flank pain and frequency.   Musculoskeletal:  Positive for arthralgias (left elbow). Negative for back pain, myalgias, neck pain and neck stiffness.   Skin:  Negative for rash.   Neurological:  Negative for dizziness, weakness, light-headedness and headaches.   Hematological:  Does not bruise/bleed easily.       Physical Exam     Initial Vitals [08/19/24 1941]   BP Pulse Resp Temp SpO2   100/60 96 18 98.6 °F (37 °C) 98 %      MAP       --         Physical Exam    Nursing note and vitals reviewed.  Constitutional: She appears well-developed and well-nourished. She is not diaphoretic. No distress.   HENT:   Head: Normocephalic and atraumatic. Head is without raccoon's eyes, without Perry's sign, without abrasion, without contusion and without laceration.   Right Ear: Tympanic membrane, external ear and ear canal normal.   Left Ear: Tympanic membrane, external ear and ear canal normal.   Nose: Nose normal. No rhinorrhea, sinus tenderness, nasal deformity, septal deviation or nasal septal hematoma. No epistaxis.   Mouth/Throat:  Uvula is midline and oropharynx is clear and moist.   Eyes: Conjunctivae, EOM and lids are normal. Pupils are equal, round, and reactive to light. Right eye exhibits no discharge. Left eye exhibits no discharge.   Neck: Trachea normal. Neck supple. No tracheal tenderness present. No tracheal deviation present.   Normal range of motion.   Full passive range of motion without pain.     Cardiovascular:  Normal rate, regular rhythm, normal heart sounds, intact distal pulses and normal pulses.     Exam reveals no distant heart sounds and no friction rub.       Pulmonary/Chest: Effort normal and breath sounds normal. No respiratory distress.   Abdominal: Abdomen is soft. Bowel sounds are normal. She exhibits no distension, no pulsatile midline mass and no mass. There is no abdominal tenderness.   No right CVA tenderness.  No left CVA tenderness. There is no rebound and no guarding.   Musculoskeletal:      Right shoulder: Normal.      Left shoulder: Normal.      Left upper arm: Deformity (distal), tenderness and bony tenderness present. No swelling, edema or lacerations.      Right elbow: Normal.      Left elbow: Deformity present. No swelling, effusion or lacerations. Decreased range of motion. Tenderness present.      Right forearm: Normal.      Left forearm: Normal.      Right wrist: Normal.      Left wrist: Normal.      Right hand: Normal.      Left hand: Normal.      Cervical back: Normal, full passive range of motion without pain, normal range of motion and neck supple. No edema, erythema or rigidity. No spinous process tenderness or muscular tenderness. Normal range of motion.      Thoracic back: Normal.      Lumbar back: Normal.      Right hip: Normal.      Left hip: Normal.      Right knee: Normal.      Left knee: Normal.      Right lower leg: Normal.      Left lower leg: Normal.      Right ankle: Normal.      Left ankle: Normal.      Right foot: Normal.      Left foot: Normal.     Neurological: She is alert and  oriented to person, place, and time. She has normal strength. No cranial nerve deficit or sensory deficit. GCS eye subscore is 4. GCS verbal subscore is 5. GCS motor subscore is 6.   Skin: Skin is warm and dry. Capillary refill takes less than 2 seconds. Abrasion (Several abrasions nonbleeding to the left forearm and elbow.) noted. No bruising and no ecchymosis noted. No erythema.   Psychiatric: She has a normal mood and affect. Her speech is normal and behavior is normal. Thought content normal.         ED Course   Splint Application    Date/Time: 8/19/2024 8:54 PM    Performed by: Heri Weinstein PA-C  Authorized by: Noé Seymour MD  Consent Done: Emergent Situation  Location details: left arm  Splint type: long arm  Supplies used: Ortho-Glass  Post-procedure: The splinted body part was neurovascularly unchanged following the procedure.  Patient tolerance: Patient tolerated the procedure well with no immediate complications      Critical Care    Date/Time: 8/19/2024 10:40 PM    Performed by: Heri Weinstein PA-C  Authorized by: Noé Seymour MD  Direct patient critical care time: 40 minutes  Additional history critical care time: 10 minutes  Ordering / reviewing critical care time: 20 minutes  Documentation critical care time: 20 minutes  Consulting other physicians critical care time: 15 minutes  Total critical care time (exclusive of procedural time) : 105 minutes  Critical care was necessary to treat or prevent imminent or life-threatening deterioration of the following conditions: trauma.  Critical care was time spent personally by me on the following activities: development of treatment plan with patient or surrogate, discussions with consultants, interpretation of cardiac output measurements, evaluation of patient's response to treatment, examination of patient, obtaining history from patient or surrogate, ordering and performing treatments and interventions, ordering and review of  laboratory studies, ordering and review of radiographic studies, re-evaluation of patient's condition, pulse oximetry and review of old charts.        Labs Reviewed   CBC W/ AUTO DIFFERENTIAL - Abnormal       Result Value    WBC 10.28      RBC 4.52      Hemoglobin 10.3 (*)     Hematocrit 33.9 (*)     MCV 75 (*)     MCH 22.8 (*)     MCHC 30.4 (*)     RDW 18.6 (*)     Platelets 183      MPV 8.1 (*)     Immature Granulocytes 0.3      Gran # (ANC) 7.4      Immature Grans (Abs) 0.03      Lymph # 1.9      Mono # 0.8      Eos # 0.1      Baso # 0.04      nRBC 0      Gran % 71.8      Lymph % 18.8      Mono % 7.8      Eosinophil % 0.9      Basophil % 0.4      Differential Method Automated     COMPREHENSIVE METABOLIC PANEL - Abnormal    Sodium 138      Potassium 3.1 (*)     Chloride 110      CO2 18 (*)     Glucose 95      BUN 18      Creatinine 0.8      Calcium 8.7      Total Protein 6.6      Albumin 3.7      Total Bilirubin 0.1      Alkaline Phosphatase 75      AST 20      ALT 14      eGFR >60      Anion Gap 10     MAGNESIUM    Magnesium 1.8     POCT URINE PREGNANCY    POC Preg Test, Ur Negative       Acceptable Yes     TYPE & SCREEN    Group & Rh O POS      Indirect Clarence NEG      Specimen Outdate 08/22/2024 23:59     GROUP & RH          Imaging Results              CT Arm Elbow Without Contrast Left (In process)                      X-Ray Chest 1 View (Final result)  Result time 08/19/24 22:44:30      Final result by Chacha Krueger MD (08/19/24 22:44:30)                   Impression:      No acute intrathoracic abnormality.      Electronically signed by: Chacha Krueger  Date:    08/19/2024  Time:    22:44               Narrative:    EXAMINATION:  CHEST ONE VIEW    CLINICAL HISTORY:  Pain in left arm    TECHNIQUE:  One view of the chest.    COMPARISON:  03/11/2024    FINDINGS:  The cardiac silhouette is within normal limits.   There is no focal consolidation, pneumothorax, or pleural effusion. Mild  dextroscoliosis is present.                                       X-Ray Elbow Complete Left (In process)                      X-Ray Forearm Left (In process)  Result time 08/19/24 22:56:27                     X-Ray Humerus 2 View Left (Final result)  Result time 08/19/24 20:43:28      Final result by Chacha Krueger MD (08/19/24 20:43:28)                   Impression:      As above described.      Electronically signed by: Chacha Krueger  Date:    08/19/2024  Time:    20:43               Narrative:    EXAMINATION:  TWO VIEWS OF THE LEFT HUMERUS    CLINICAL HISTORY:  Pain in arm, unspecified    TECHNIQUE:  AP and lateral view of the left humerus    COMPARISON:  None.    FINDINGS:  Single AP of the left humerus demonstrate mildly comminuted fracture of the distal femur.  The condylar region of the humerus appears to articulate with the proximal radius and ulna.  There are multiple foci of air seen at the distal soft tissues of the arm, which may be related to penetrating injury or laceration.  Recommend clinical correlation.                                    X-Rays:   Independently Interpreted Readings:   Chest X-Ray: Personal interpretation:  Questionable slight rotation, no convincing cardiomegaly, no obvious effusion, no dense lobar consolidation, no obvious pneumothorax.     Medications   ceFAZolin 2 g in D5W 50 mL IVPB (MB+) (has no administration in time range)   LORazepam injection 0.5 mg (0.5 mg Intravenous Given 8/19/24 2156)   QUEtiapine tablet 25 mg (25 mg Oral Given 8/19/24 2253)   gabapentin capsule 300 mg (300 mg Oral Given 8/19/24 2253)   mupirocin 2 % ointment 1 Tube (1 Tube Topical (Top) Given 8/19/24 2015)   sodium chloride 0.9% bolus 1,000 mL 1,000 mL (0 mLs Intravenous Stopped 8/19/24 2255)   morphine injection 4 mg (4 mg Intravenous Given 8/19/24 2108)   ceFAZolin (ANCEF) 1 g in D5W 50 mL IVPB (MB+) (0 g Intravenous Stopped 8/19/24 2255)   potassium bicarbonate disintegrating tablet 50 mEq  (50 mEq Oral Given 8/19/24 0018)     Medical Decision Making  34-year-old female with past medical history of bipolar presenting to the emergency department today via EMS for evaluation of a arm injury x1 hour.  Patient states she was roller-skating when she lost her balance falling forward landing on her left arm outstretched.  Patient's AMS noted deformity of the left elbow.  Patient's arm was splinted prior to arrival.  Patient was given fentanyl for pain prior to arrival with her pain 5/10.  Patient states she has motion of her hand and wrist however is too painful to move her elbow.  No other complaints at this time.  Denies any extremity paresthesias, head injury, loss of consciousness, neck or back pain.  Patient's chart and medical history reviewed.  Patient's vitals reviewed.  They are afebrile, no respiratory distress, nontoxic-appearing in the ED.  Differential diagnosis is considered the following.  - Septic Arthritis, Gout, Osteomyelitis: considered with pain, although unlikely without overlying erythema, patient is afebrile  - Fracture/Dislocation: considered with pain, imaging ordered for further evaluation  - Compartment Syndrome: unlikely with 2+ distal pulses, no pallor, no paresthesias, no woody induration.   Patient is up to date on her tetanus. Given Morphine for pain.  Pending imaging, plan accordingly for possible reduction and orthopedic consult vs splinting and discharge.  At the end of my shift patient case and care discussed with and transferred over to Heri Sorto PA-C. Patient stable upon last contact.      JAH THOMAS PA-C    DISCLAIMER: This note was prepared with [employer] voice recognition transcription software. Garbled syntax, mangled pronouns, and other bizarre constructions may be attributed to that software system.      Amount and/or Complexity of Data Reviewed  Labs: ordered.  Radiology: ordered. Decision-making details documented in ED Course.    Risk  Prescription drug  management.               ED Course as of 08/19/24 2256   Mon Aug 19, 2024   2033 Roller skating, tripped over a curb, FOOSH injury.  EMS contacted by bystander.  Denies head injury or LOC.  Denies neck or back pain.  Denies any other wounds or injuries sustained during the incident.  Right-hand dominant.  Admits to mild numbness develop elbow, no numbness to the hand or wrist.  Retains full active range of motion of the wrist and hand.  Sensation intact distally.  2+ radial bilaterally.    Displaced fracture of distal humerus, puncture wound overlying the lateral aspect of the elbow with serous sanguinous drainage, significant edema about the elbow and distal humerus, subq air on imaging suspicious for intra-articular involvement/open fracture. []   2034 Antibiotics administered, preop labs ordered, will contact ortho after stabilization of the arm.    Last PO intake around 5:30pm.  []   2117 Discussed case with ; strongly encouraged three views of left elbow, images of left forearm, followed by CT of the elbow joint.    Will place in a right angle long arm splint with a coaptation, Ancef 2 g q.6; will discuss case following CT of elbow. []   2216  reviewed images; agrees with the need for admission.  Suggested  admission with ortho consult.  He will see her early in the a.m. for consents, for re-evaluation.  Continue pain control overnight, continue antibiotics overnight. []      ED Course User Index  [SM] Heri Weinstein PA-C                           Clinical Impression:  Final diagnoses:  [W19.XXXA] Fall  [E87.6] Hypokalemia  [S42.492B] Open bicondylar fracture of left humerus, initial encounter (Primary)          ED Disposition Condition    Observation                 Sammy Acevedo PA-C  08/19/24 2022            Heri Weinstein PA-C  08/19/24 2256

## 2024-08-21 VITALS
DIASTOLIC BLOOD PRESSURE: 63 MMHG | HEIGHT: 63 IN | OXYGEN SATURATION: 100 % | SYSTOLIC BLOOD PRESSURE: 116 MMHG | RESPIRATION RATE: 18 BRPM | TEMPERATURE: 99 F | WEIGHT: 132 LBS | BODY MASS INDEX: 23.39 KG/M2 | HEART RATE: 94 BPM

## 2024-08-21 LAB
ANION GAP SERPL CALC-SCNC: 7 MMOL/L (ref 8–16)
BASOPHILS # BLD AUTO: 0.03 K/UL (ref 0–0.2)
BASOPHILS NFR BLD: 0.4 % (ref 0–1.9)
BUN SERPL-MCNC: 5 MG/DL (ref 6–20)
CALCIUM SERPL-MCNC: 8.5 MG/DL (ref 8.7–10.5)
CHLORIDE SERPL-SCNC: 109 MMOL/L (ref 95–110)
CO2 SERPL-SCNC: 22 MMOL/L (ref 23–29)
CREAT SERPL-MCNC: 0.7 MG/DL (ref 0.5–1.4)
DIFFERENTIAL METHOD BLD: ABNORMAL
EOSINOPHIL # BLD AUTO: 0.1 K/UL (ref 0–0.5)
EOSINOPHIL NFR BLD: 1 % (ref 0–8)
ERYTHROCYTE [DISTWIDTH] IN BLOOD BY AUTOMATED COUNT: 18.8 % (ref 11.5–14.5)
EST. GFR  (NO RACE VARIABLE): >60 ML/MIN/1.73 M^2
GLUCOSE SERPL-MCNC: 108 MG/DL (ref 70–110)
HCT VFR BLD AUTO: 32.7 % (ref 37–48.5)
HGB BLD-MCNC: 9.8 G/DL (ref 12–16)
IMM GRANULOCYTES # BLD AUTO: 0.03 K/UL (ref 0–0.04)
IMM GRANULOCYTES NFR BLD AUTO: 0.4 % (ref 0–0.5)
LYMPHOCYTES # BLD AUTO: 1.8 K/UL (ref 1–4.8)
LYMPHOCYTES NFR BLD: 24.6 % (ref 18–48)
MAGNESIUM SERPL-MCNC: 1.8 MG/DL (ref 1.6–2.6)
MCH RBC QN AUTO: 22.7 PG (ref 27–31)
MCHC RBC AUTO-ENTMCNC: 30 G/DL (ref 32–36)
MCV RBC AUTO: 76 FL (ref 82–98)
MONOCYTES # BLD AUTO: 1 K/UL (ref 0.3–1)
MONOCYTES NFR BLD: 13.2 % (ref 4–15)
NEUTROPHILS # BLD AUTO: 4.4 K/UL (ref 1.8–7.7)
NEUTROPHILS NFR BLD: 60.4 % (ref 38–73)
NRBC BLD-RTO: 0 /100 WBC
PHOSPHATE SERPL-MCNC: 2.9 MG/DL (ref 2.7–4.5)
PLATELET # BLD AUTO: 175 K/UL (ref 150–450)
PMV BLD AUTO: 8.6 FL (ref 9.2–12.9)
POTASSIUM SERPL-SCNC: 3.8 MMOL/L (ref 3.5–5.1)
RBC # BLD AUTO: 4.32 M/UL (ref 4–5.4)
SODIUM SERPL-SCNC: 138 MMOL/L (ref 136–145)
WBC # BLD AUTO: 7.33 K/UL (ref 3.9–12.7)

## 2024-08-21 PROCEDURE — 84100 ASSAY OF PHOSPHORUS: CPT | Performed by: ORTHOPAEDIC SURGERY

## 2024-08-21 PROCEDURE — 97165 OT EVAL LOW COMPLEX 30 MIN: CPT

## 2024-08-21 PROCEDURE — 25000003 PHARM REV CODE 250: Performed by: ORTHOPAEDIC SURGERY

## 2024-08-21 PROCEDURE — 97535 SELF CARE MNGMENT TRAINING: CPT

## 2024-08-21 PROCEDURE — 80048 BASIC METABOLIC PNL TOTAL CA: CPT | Performed by: ORTHOPAEDIC SURGERY

## 2024-08-21 PROCEDURE — 36415 COLL VENOUS BLD VENIPUNCTURE: CPT | Performed by: ORTHOPAEDIC SURGERY

## 2024-08-21 PROCEDURE — 25000003 PHARM REV CODE 250: Performed by: HOSPITALIST

## 2024-08-21 PROCEDURE — 63600175 PHARM REV CODE 636 W HCPCS: Performed by: HOSPITALIST

## 2024-08-21 PROCEDURE — 85025 COMPLETE CBC W/AUTO DIFF WBC: CPT | Performed by: ORTHOPAEDIC SURGERY

## 2024-08-21 PROCEDURE — 63600175 PHARM REV CODE 636 W HCPCS: Performed by: ORTHOPAEDIC SURGERY

## 2024-08-21 PROCEDURE — 83735 ASSAY OF MAGNESIUM: CPT | Performed by: ORTHOPAEDIC SURGERY

## 2024-08-21 RX ORDER — FERROUS SULFATE 325(65) MG
325 TABLET ORAL DAILY
Qty: 30 TABLET | Refills: 2 | Status: SHIPPED | OUTPATIENT
Start: 2024-08-21 | End: 2024-11-19

## 2024-08-21 RX ORDER — CEPHALEXIN 500 MG/1
1000 CAPSULE ORAL 2 TIMES DAILY
Qty: 8 CAPSULE | Refills: 0 | Status: SHIPPED | OUTPATIENT
Start: 2024-08-22 | End: 2024-08-24

## 2024-08-21 RX ORDER — AMOXICILLIN 250 MG
1 CAPSULE ORAL 2 TIMES DAILY PRN
Qty: 20 TABLET | Refills: 0 | Status: SHIPPED | OUTPATIENT
Start: 2024-08-21

## 2024-08-21 RX ORDER — HYDROCODONE BITARTRATE AND ACETAMINOPHEN 10; 325 MG/1; MG/1
1 TABLET ORAL EVERY 6 HOURS PRN
Qty: 20 TABLET | Refills: 0 | Status: SHIPPED | OUTPATIENT
Start: 2024-08-21

## 2024-08-21 RX ORDER — ONDANSETRON 4 MG/1
4 TABLET, ORALLY DISINTEGRATING ORAL EVERY 8 HOURS PRN
Qty: 20 TABLET | Refills: 0 | Status: SHIPPED | OUTPATIENT
Start: 2024-08-21

## 2024-08-21 RX ADMIN — ACETAMINOPHEN 650 MG: 325 TABLET ORAL at 04:08

## 2024-08-21 RX ADMIN — HYDROCODONE BITARTRATE AND ACETAMINOPHEN 1 TABLET: 10; 325 TABLET ORAL at 12:08

## 2024-08-21 RX ADMIN — ACETAMINOPHEN 650 MG: 325 TABLET ORAL at 06:08

## 2024-08-21 RX ADMIN — CEFAZOLIN 2 G: 2 INJECTION, POWDER, FOR SOLUTION INTRAMUSCULAR; INTRAVENOUS at 06:08

## 2024-08-21 RX ADMIN — ENOXAPARIN SODIUM 40 MG: 40 INJECTION SUBCUTANEOUS at 04:08

## 2024-08-21 RX ADMIN — ACETAMINOPHEN 650 MG: 325 TABLET ORAL at 02:08

## 2024-08-21 RX ADMIN — SENNOSIDES AND DOCUSATE SODIUM 1 TABLET: 50; 8.6 TABLET ORAL at 08:08

## 2024-08-21 RX ADMIN — GABAPENTIN 600 MG: 300 CAPSULE ORAL at 08:08

## 2024-08-21 RX ADMIN — FLUOXETINE HYDROCHLORIDE 60 MG: 20 CAPSULE ORAL at 08:08

## 2024-08-21 RX ADMIN — CEFAZOLIN 2 G: 2 INJECTION, POWDER, FOR SOLUTION INTRAMUSCULAR; INTRAVENOUS at 10:08

## 2024-08-21 RX ADMIN — CEFAZOLIN 2 G: 2 INJECTION, POWDER, FOR SOLUTION INTRAMUSCULAR; INTRAVENOUS at 02:08

## 2024-08-21 RX ADMIN — FERROUS SULFATE TAB 325 MG (65 MG ELEMENTAL FE) 1 EACH: 325 (65 FE) TAB at 08:08

## 2024-08-21 RX ADMIN — GABAPENTIN 600 MG: 300 CAPSULE ORAL at 02:08

## 2024-08-21 RX ADMIN — MUPIROCIN: 20 OINTMENT TOPICAL at 08:08

## 2024-08-21 RX ADMIN — OXCARBAZEPINE 150 MG: 150 TABLET, FILM COATED ORAL at 08:08

## 2024-08-21 NOTE — PT/OT/SLP PROGRESS
Physical Therapy      Patient Name:  Patsy Akins   MRN:  [id number]    Patient not seen today secondary to  (Per OT evaluation, pt performing ADL's and ambulating >100' Modified Independent without AD.  PT to sign off.  Please refer to OT evaluation for D/C recs.

## 2024-08-21 NOTE — PLAN OF CARE
[facility]  Discharge Final Note    Primary Care Provider: Ghanshyam Ramos MD  Case Management Final Discharge Note      Discharge Disposition: home    New DME ordered / company name: none    Relevant SDOH / Transition of Care Barriers:  No Help @ Home    Primary Caretaker and contact information: self    Scheduled followup appointment: Ortho  9/5/2024 @ 10:30 a.m.    Referrals placed: Ortho    Transportation: [employer]        Patient and family educated on discharge services and updated on DC plan. Bedside RN notified, patient clear to discharge from Case Management Perspective.     Expected Discharge Date: 8/21/2024    Final Discharge Note (most recent)       Final Note - 08/21/24 1328          Final Note    Assessment Type Final Discharge Note     Anticipated Discharge Disposition Home or Self Care     What phone number can be called within the next 1-3 days to see how you are doing after discharge? [phone]     Hospital Resources/Appts/Education Provided Appointments scheduled and added to AVS        Post-Acute Status    Discharge Delays None known at this time                     Important Message from Medicare             Contact Info       Arnaud Simon MD   Specialty: Orthopedic Surgery    [address] [place] [address]  [address]  [place] [address]   Phone: [phone]       Next Steps: Follow up on 9/5/2024    Instructions: 10:30 a.m. on September 5, 2024 see Dr. Simon for your followup appointment.          Addendum: Will have last dose of Abx prior to discharge.

## 2024-08-21 NOTE — PLAN OF CARE
Pt stable, VS WNL.  Voiding and ambulating independently.  Tolerating regular diet.  Pain managed with tylenol 650, and Norco 10.  Received 0230 dose of cefazolin.  IV SL.  L arm propped on pillow.  Pt doing well.

## 2024-08-21 NOTE — DISCHARGE SUMMARY
"[facility]  [facility] Medicine  Discharge Summary      Patient Name: Patsy Akins  MRN: [id number]  JANNETH: [id number]  Patient Class: IP- Inpatient  Admission Date: 8/19/2024  Hospital Length of Stay: 1 days  Discharge Date and Time:  08/21/2024 11:41 AM  Attending Physician: Gina Merchant MD   Discharging Provider: Gina Merchant MD  Primary Care Provider: Ghanshyam Ramos MD    Primary Care Team: Networked reference to record PCT     HPI:   Ms. Akins is a 34 yr old female with a hx of bipolar 1 disorder, GERD, HTN, chronic back pain, irritable bowel syndrome who presents to the ED via EMS with an arm injury.  Patient endorses that around 6-6:30 p.m. she was roller-skating and was going down a ramp when she fell.  Patient endorses sticking her hand out to catch herself and falling onto her hand and arm.  She is unsure how she landed.  She endorses looking at the injury and knowing right away that she had fractured her arm.  She endorses throbbing pain to the area since.  She also endorses lightheadedness.  She denies tobacco, alcohol, and recreational drug use.  She denies fever, chills, SOB, CP, nausea, vomiting, diarrhea, dysuria, hematuria, dizziness, and syncope.    Upon arrival to ED, patient afebrile, HR 96, RR of 18, BP of 100/60, satting 98% on RA.  Workup in the ED included CBC, CMP, magnesium, hCG, type and screen, CXR, CT arm elbow left, left humerus x-ray, left elbow x-ray, left forearm x-ray.  Workup revealed H/H of 10.3/33.9, potassium of 3.1, CO2 of 18.  Left humerus x-ray showed Single AP of the left humerus demonstrate mildly comminuted fracture of the distal femur.  The condylar region of the humerus appears to articulate with the proximal radius and ulna.  There are multiple foci of air seen at the distal soft tissues of the arm, which may be related to penetrating injury or laceration.  Recommend clinical correlation."CXR showed no acute intrathoracic abnormality.   " "Left forearm x-ray shows AP and lateral view of the left forearm demonstrates mildly comminuted fracture of the distal humeral condyle.  There are foci of air seen in the subcutaneous fat at the lower humerus and elbow.  Bones are normally mineralized."Left elbow x-ray shows Three views of the left elbow demonstrate mildly comminuted displaced fracture of the humerus.  Foci of air seen in the subcutaneous tissues of the elbow.  The bones are normally mineralized." CT arm elbow without contrast showed There is acute traumatic comminuted fracture of the distal humerus in the condylar region with intra-articular extension.  The radius and ulna visualized are intact.  There are multiple foci of air seen within the soft tissues about the elbow suggesting an open fracture.".  Patient was given cefazolin 1 g IV x2, gabapentin 300 mg oral, morphine 4 mg IV, mupirocin ointment, potassium bicarb 50 mEq oral, quetiapine 25 mg oral, 1 L NS bolus, lorazepam 0.5 mg IV while in the ED. case discussed with ED provider and patient will be placed under observation for further management.    Procedure(s) (LRB):  ORIF, FRACTURE, HUMERUS, DISTAL (Left)  WASHOUT (Left)      Hospital Course:   Ms Patsy Akins was admitted with L humeral fracture. To OR on 8/20/24 for     Open reduction internal fixation left distal humerus fracture intra-articular comminuted displaced  Washout open fracture with debridement  Neurolysis ulnar nerve    With Dr Simon. Continued cefazolin post op due to open fracture. No weight bearing to left upper extremity. Worked with OT post operatively, no issues. Tolerating diet. Stable for discharge to home with Orthopaedics follow up. Per Ortho, keflex 1000mg BID x2 days due to open fracture. PRN norco, zofran, senna/doc also prescribed. Prescribed iron for iron deficiency anemia which will need further evaluation as outpatient.      Goals of Care Treatment Preferences:  Code Status: Full Code       "   Consults:   Consults (From admission, onward)          Status Ordering Provider     Inpatient consult to Orthopedic Surgery  Once        Provider:  Arnaud Simon MD    Completed ANGIE SUMMERS            No new Assessment & Plan notes have been filed under this hospital service since the last note was generated.  Service: Hospital Medicine    Final Active Diagnoses:    Diagnosis Date Noted POA    PRINCIPAL PROBLEM:  Open bicondylar fracture of distal end of left humerus [S42.492B] 08/19/2024 Yes    Essential hypertension [I10] 11/16/2019 Yes    Bipolar 1 disorder, depressed [F31.9] 11/15/2019 Yes      Problems Resolved During this Admission:       Discharged Condition: good    Disposition: Home or Self Care    Follow Up:   Follow-up Information       Schedule an appointment as soon as possible for a visit  with [facility] -.    Specialties: Orthopedic Surgery, Physical Therapy  Why: As needed, for follow up  Contact information:  [address] [place] [address]  [phone]                           Patient Instructions:      Diet Adult Regular     Notify your health care provider if you experience any of the following:  temperature >100.4     Notify your health care provider if you experience any of the following:  persistent nausea and vomiting or diarrhea     Notify your health care provider if you experience any of the following:  severe uncontrolled pain     Notify your health care provider if you experience any of the following:  redness, tenderness, or signs of infection (pain, swelling, redness, odor or green/yellow discharge around incision site)     Notify your health care provider if you experience any of the following:  difficulty breathing or increased cough     Notify your health care provider if you experience any of the following:  severe persistent headache     Notify your health care provider if you experience any of the following:  worsening rash      Notify your health care provider if you experience any of the following:  persistent dizziness, light-headedness, or visual disturbances     Notify your health care provider if you experience any of the following:  increased confusion or weakness     Activity as tolerated       Significant Diagnostic Studies: N/A    Pending Diagnostic Studies:       Procedure Component Value Units Date/Time    EKG 12-lead [[id number]]     Order Status: Sent Lab Status: No result            Medications:  Reconciled Home Medications:      Medication List        START taking these medications      cephALEXin 500 MG capsule  Commonly known as: KEFLEX  Take 2 capsules (1,000 mg total) by mouth 2 (two) times a day. for 2 days  Start taking on: August 22, 2024     ferrous sulfate 325 (65 FE) MG EC tablet  Take 1 tablet (325 mg total) by mouth once daily.     HYDROcodone-acetaminophen  mg per tablet  Commonly known as: NORCO  Take 1 tablet by mouth every 6 (six) hours as needed (severe pain not controlled by tylenol or ibuprofen).     senna-docusate 8.6-50 mg 8.6-50 mg per tablet  Commonly known as: PERICOLACE  Take 1 tablet by mouth 2 (two) times daily as needed for Constipation.            CHANGE how you take these medications      OXcarbazepine 300 MG Tab  Commonly known as: TRILEPTAL  Take 0.5 tablets (150 mg total) by mouth 2 (two) times daily.  What changed: how much to take            CONTINUE taking these medications      * FLUoxetine 40 MG capsule  Take 40 mg by mouth once daily.     * FLUoxetine 20 MG capsule  Take 20 mg by mouth once daily.     gabapentin 600 MG tablet  Commonly known as: NEURONTIN  Take 600 mg by mouth 3 (three) times daily.     hydrOXYzine 50 MG tablet  Commonly known as: ATARAX  Take 100 mg by mouth as needed.     melatonin 5 mg  Commonly known as: MELATIN  Take by mouth.     omeprazole 20 MG capsule  Commonly known as: PRILOSEC  TAKE 1 CAPSULE BY MOUTH DAILY     ondansetron 4 MG Tbdl  Commonly known  as: ZOFRAN-ODT  Take 1 tablet (4 mg total) by mouth every 8 (eight) hours as needed (nausea/vomiting).     QUEtiapine 25 MG Tab  Commonly known as: SEROQUEL  Take 25 mg by mouth every evening.     topiramate 200 MG Tab  Commonly known as: TOPAMAX  TAKE 1 TABLET BY MOUTH IN THE EVENING           * This list has 2 medication(s) that are the same as other medications prescribed for you. Read the directions carefully, and ask your doctor or other care provider to review them with you.                  Indwelling Lines/Drains at time of discharge:   Lines/Drains/Airways       None                   Time spent on the discharge of patient: 35 minutes         Gina Merchant MD  Department of Hospital Medicine  [facility]

## 2024-08-21 NOTE — PLAN OF CARE
[facility]  Initial Discharge Assessment       Primary Care Provider: Ghanshyam Ramos MD  Case Management Assessment     PCP: See above  Pharmacy: [facility]    Patient Arrived From: home alone  Existing Help at Home: none    Barriers to Discharge: No Help @ Home    Discharge Plan:    A. home   B. home      Discharge planning assessment completed with patient's assistance.  Patient is from home alone and does not have help.  Patient reported that she participates in an Intensive Out Patient (Mental Health) Program and stated that she will have interaction with others on a regular basis. She also has a [employer] counselor.  At discharge, she will call a [employer].           Admission Diagnosis: Hypokalemia [E87.6]  Fall [W19.XXXA]  Chest pain [R07.9]  Open bicondylar fracture of left humerus, initial encounter [S42.492B]    Admission Date: 8/19/2024  Expected Discharge Date: 8/21/2024    Transition of Care Barriers: No family/friends to help    Payor: MEDICARE / Plan: MEDICARE PART A & B / Product Type: Government /     Extended Emergency Contact Information  Primary Emergency Contact: yenni simmons   [place]  Mobile Phone: [phone]  Relation: Brother  Secondary Emergency Contact: Evangelina De Leon  Home Phone: [phone]  Relation: Other  Preferred language: English   needed? No    Discharge Plan A: Home  Discharge Plan B: Home      [facility] - [place] - [id number] [facility]  [place] [address]  Phone: [phone] Fax: [phone]    [id number] - [place] - [address] [facility]  [address] [facility] [address] [place] [address]  Phone: [phone] Fax: [phone]    [facility] - [place] - [address] [facility]  [address] [facility] [address] [facility]  [place] [address]  Phone: [phone] Fax: [phone]      Initial Assessment (most recent)       Adult Discharge Assessment - 08/21/24 1323          Discharge  Assessment    Assessment Type Discharge Planning Assessment     Confirmed/corrected address, phone number and insurance Yes     Confirmed Demographics Correct on Facesheet     Source of Information patient     Communicated JEAN with patient/caregiver Yes     Reason For Admission Hypokalemia and arm injury     People in Home alone     Facility Arrived From: Home alone     Do you expect to return to your current living situation? Yes     Do you have help at home or someone to help you manage your care at home? No     Prior to hospitilization cognitive status: Alert/Oriented     Current cognitive status: Alert/Oriented     Walking or Climbing Stairs Difficulty no     Dressing/Bathing Difficulty no     Equipment Currently Used at Home none     Readmission within 30 days? No     Patient currently being followed by outpatient case management? No     Do you currently have service(s) that help you manage your care at home? No   IOP at [facility]    Do you take prescription medications? Yes     Do you have prescription coverage? Yes     Coverage Medicaid     Do you have any problems affording any of your prescribed medications? No     Who is going to help you get home at discharge? [employer]     How do you get to doctors appointments? other (see comments)     Are you on dialysis? No     Do you take coumadin? No     Discharge Plan A Home     Discharge Plan B Home     DME Needed Upon Discharge  none     Discharge Plan discussed with: Patient     Transition of Care Barriers No family/friends to help

## 2024-08-21 NOTE — HOSPITAL COURSE
Ms Patsy Aikns was admitted with L humeral fracture. To OR on 8/20/24 for     Open reduction internal fixation left distal humerus fracture intra-articular comminuted displaced  Washout open fracture with debridement  Neurolysis ulnar nerve    With Dr Simon. Continued cefazolin post op due to open fracture. No weight bearing to left upper extremity. Worked with OT post operatively, no issues. Tolerating diet. Stable for discharge to home with Orthopaedics follow up. Per Ortho, keflex 1000mg BID x2 days due to open fracture. PRN norco, zofran, senna/doc also prescribed. Prescribed iron for iron deficiency anemia which will need further evaluation as outpatient.

## 2024-08-21 NOTE — PLAN OF CARE
Problem: Occupational Therapy  Goal: Occupational Therapy Goal  Description: Goals to be met by: 8/21/2024     Patient will increase functional independence with ADLs by performing:    UE Dressing with Modified [place]. Goal Met  Supine to sit with Modified [place]. Goal Met  Stand pivot transfers with Modified [place]. Goal Met  Upper extremity exercise program x 10 reps , with independence. Goal Met    Outcome: Adequate for Care Transition    Pt able to ambulate over 100 feet Independently without assistive device.  Pt understands donning and doffing sling with adjustments  ROM for Edema management   Pt able to teach back information  Pt to cover splint for bathing

## 2024-08-21 NOTE — PT/OT/SLP EVAL
Occupational Therapy   Evaluation    Name: Patsy Akins  MRN: [id number]  Admitting Diagnosis: Open bicondylar fracture of distal end of left humerus  Recent Surgery: Procedure(s) (LRB):  ORIF, FRACTURE, HUMERUS, DISTAL (Left)  WASHOUT (Left) 1 Day Post-Op    Recommendations:     Discharge Recommendations: Low Intensity Therapy  Discharge Equipment Recommendations:  none  Barriers to discharge:  None    Assessment:     Patsy Akins is a 34 y.o. female with a medical diagnosis of Open bicondylar fracture of distal end of left humerus.  She presents with no family support. Performance deficits affecting function: weakness, decreased upper extremity function, decreased ROM, impaired coordination, impaired joint extensibility, impaired fine motor, pain, orthopedic precautions.      Rehab Prognosis: Good; patient would benefit from acute skilled OT services to address these deficits and reach maximum level of function.       Plan:     Patient to be seen   to address the above listed problems via therapeutic activities, therapeutic exercises  Plan of Care Expires:    Plan of Care Reviewed with: patient    Subjective     Chief Complaint: Pain  Patient/Family Comments/goals:  to return to home    Occupational Profile:  Living Environment: Pt lives independently  Previous level of function: Independent   Roles and Routines: Skating and able to care for self  Equipment Used at Home: none  Assistance upon Discharge: none one is available to help    Pain/Comfort:  Pain Rating 1: 3/10  Pain Rating Post-Intervention 1: 3/10    Patients cultural, spiritual, Cheondoism conflicts given the current situation:      Objective:     Communicated with: nurse prior to session.  Patient found supine with   upon OT entry to room.    General Precautions: Standard, fall  Orthopedic Precautions: LUE non weight bearing  Braces: UE Sling  Respiratory Status: Room air    Occupational Performance:    Bed Mobility:    Patient completed  Rolling/Turning to Left with  modified independence  Patient completed Rolling/Turning to Right with modified independence  Patient completed Scooting/Bridging with modified independence  Patient completed Supine to Sit with modified independence    Functional Mobility/Transfers:  Patient completed Sit <> Stand Transfer with modified independence  with  no assistive device   Patient completed Bed <> Chair Transfer using Stand Pivot technique with modified independence with no assistive device  Functional Mobility: Pt able to ambulate over 100 feet with no assistive device    Activities of Daily Living:  Feeding:  modified independence    Grooming: modified independence    Upper Body Dressing: supervision verbal cues to adjust sling    Cognitive/Visual Perceptual:  Cognitive/Psychosocial Skills:     -       Oriented to: Person, Place, Time, and Situation   -       Follows Commands/attention:Follows two-step commands  -       Communication: clear/fluent  -       Memory: No Deficits noted  -       Safety awareness/insight to disability: intact   -       Mood/Affect/Coping skills/emotional control: Flat affect    Physical Exam:  Balance:    -       good  Upper Extremity Range of Motion:     Upper Extremity Strength:    -       Right Upper Extremity: WFL  -       Left Upper Extremity: WFL unable to assess elbow ( in half cast with ace wrap/ Pt instructed to use cast cover for bathing skills   Strength:    -       Right Upper Extremity: WFL  -       Left Upper Extremity: WFL    AMPA 6 Click ADL:  AMPAC Total Score: 24    Treatment & Education:  -Education ROM  -cast cover for Left UE    Patient left sitting edge of bed with call button in reach and nurse  notified    GOALS:   Multidisciplinary Problems       Occupational Therapy Goals          Problem: Occupational Therapy    Goal Priority Disciplines Outcome Interventions   Occupational Therapy Goal     OT, PT/OT Adequate for Care Transition    Description: Goals to  be met by: 8/21/2024     Patient will increase functional independence with ADLs by performing:    UE Dressing with Modified [place]. Goal Met  Supine to sit with Modified [place]. Goal Met  Stand pivot transfers with Modified [place]. Goal Met  Upper extremity exercise program x 10 reps , with independence. Goal Met                         History:     Past Medical History:   Diagnosis Date    Bipolar affective     Bipolar disorder with depression     Anxiety disorder    Chronic back pain     Hypokalemia     Irritable bowel syndrome        History reviewed. No pertinent surgical history.    Time Tracking:     OT Date of Treatment: 08/21/24  OT Start Time: 0758  OT Stop Time: 0822  OT Total Time (min): 24 min    Billable Minutes:Evaluation 10  Self Care/Home Management 14    8/21/2024

## 2024-08-22 NOTE — PROGRESS NOTES
Awake female sitting on side of bed.  She reports she is ready for discharge.    Temp:  [98.9 °F (37.2 °C)-99.7 °F (37.6 °C)] 98.9 °F (37.2 °C)  Pulse:  [82-99] 94  Resp:  [17-18] 18  SpO2:  [97 %-100 %] 100 %  BP: ()/(63-68) 116/63    Physical examination:    Left arm is in a posterior splint.  She has a sling in place.  The sling is adjusted for comfort on her.  She is able to move her fingers well with intact digital flexion and extension and intrinsics.  Sensibility to light touch is present throughout.      Recent Labs   Lab 08/21/24  0609   WBC 7.33   RBC 4.32   HGB 9.8*   HCT 32.7*      MCV 76*   MCH 22.7*   MCHC 30.0*       No current facility-administered medications for this encounter.    Current Outpatient Medications:     [START ON 8/22/2024] cephALEXin (KEFLEX) 500 MG capsule, Take 2 capsules (1,000 mg total) by mouth 2 (two) times a day for 2 days, Disp: 8 capsule, Rfl: 0    ferrous sulfate (FEOSOL) 325 mg (65 mg iron) Tab tablet, Take 1 tablet (325 mg total) by mouth once daily., Disp: 30 tablet, Rfl: 2    FLUoxetine 20 MG capsule, Take 20 mg by mouth once daily., Disp: , Rfl:     FLUoxetine 40 MG capsule, Take 40 mg by mouth once daily., Disp: , Rfl:     gabapentin (NEURONTIN) 600 MG tablet, Take 600 mg by mouth 3 (three) times daily., Disp: , Rfl:     HYDROcodone-acetaminophen (NORCO)  mg per tablet, Take 1 tablet by mouth every 6 (six) hours as needed for severe pain not controlled by Tylenol or Ibuprofen, Disp: 20 tablet, Rfl: 0    hydrOXYzine (ATARAX) 50 MG tablet, Take 100 mg by mouth as needed., Disp: , Rfl:     melatonin (MELATIN) 5 mg, Take by mouth., Disp: , Rfl:     omeprazole (PRILOSEC) 20 MG capsule, TAKE 1 CAPSULE BY MOUTH DAILY, Disp: 30 capsule, Rfl: 10    ondansetron (ZOFRAN-ODT) 4 MG TbDL, Dissolve 1 tablet (4 mg total) by mouth every 8 (eight) hours as needed (nausea/vomiting)., Disp: 20 tablet, Rfl: 0    OXcarbazepine (TRILEPTAL) 300 MG Tab, Take 0.5 tablets (150  mg total) by mouth 2 (two) times daily., Disp: 180 tablet, Rfl: 2    QUEtiapine (SEROQUEL) 25 MG Tab, Take 25 mg by mouth every evening., Disp: , Rfl:     senna-docusate 8.6-50 mg (PERICOLACE) 8.6-50 mg per tablet, Take 1 tablet by mouth 2 (two) times daily as needed for Constipation., Disp: 20 tablet, Rfl: 0    topiramate (TOPAMAX) 200 MG Tab, TAKE 1 TABLET BY MOUTH IN THE EVENING, Disp: 30 tablet, Rfl: 10    Assessment and Plan:    Status post I and D and ORIF left distal humerus fracture.  Continue with splint and sling.  Follow-up Dr. Simon in 2 weeks.    Bessy Ortiz MD  [facility]  [phone]  This note has been transcribed with voice recognition software, but not reviewed and may contain unrecognized errors.

## 2024-09-02 ENCOUNTER — PATIENT MESSAGE (OUTPATIENT)
Dept: OBSTETRICS AND GYNECOLOGY | Facility: CLINIC | Age: 34
End: 2024-09-02
Payer: MEDICARE

## 2024-09-24 ENCOUNTER — TELEPHONE (OUTPATIENT)
Dept: NEUROLOGY | Facility: CLINIC | Age: 34
End: 2024-09-24
Payer: MEDICARE

## 2024-09-27 ENCOUNTER — TELEPHONE (OUTPATIENT)
Dept: NEUROLOGY | Facility: CLINIC | Age: 34
End: 2024-09-27
Payer: MEDICARE

## 2024-09-27 NOTE — TELEPHONE ENCOUNTER
Spoke with patient about rescheduling EMG appointment to 10/1/24. Patient verbalized understanding.

## 2024-10-17 ENCOUNTER — PATIENT MESSAGE (OUTPATIENT)
Dept: ADMINISTRATIVE | Facility: CLINIC | Age: 34
End: 2024-10-17
Payer: MEDICARE

## 2024-10-18 ENCOUNTER — TELEPHONE (OUTPATIENT)
Dept: ADMINISTRATIVE | Facility: CLINIC | Age: 34
End: 2024-10-18
Payer: MEDICARE

## 2024-10-21 ENCOUNTER — OFFICE VISIT (OUTPATIENT)
Dept: HOME HEALTH SERVICES | Facility: CLINIC | Age: 34
End: 2024-10-21
Payer: MEDICARE

## 2024-10-21 ENCOUNTER — TELEPHONE (OUTPATIENT)
Dept: ADMINISTRATIVE | Facility: CLINIC | Age: 34
End: 2024-10-21
Payer: MEDICARE

## 2024-10-21 VITALS — BODY MASS INDEX: 23.74 KG/M2 | HEIGHT: 63 IN | WEIGHT: 134 LBS

## 2024-10-21 DIAGNOSIS — G89.29 CHRONIC BACK PAIN, UNSPECIFIED BACK LOCATION, UNSPECIFIED BACK PAIN LATERALITY: ICD-10-CM

## 2024-10-21 DIAGNOSIS — F31.9 BIPOLAR 1 DISORDER, DEPRESSED: ICD-10-CM

## 2024-10-21 DIAGNOSIS — M54.9 CHRONIC BACK PAIN, UNSPECIFIED BACK LOCATION, UNSPECIFIED BACK PAIN LATERALITY: ICD-10-CM

## 2024-10-21 DIAGNOSIS — D64.9 ANEMIA, UNSPECIFIED TYPE: ICD-10-CM

## 2024-10-21 DIAGNOSIS — Z00.00 ENCOUNTER FOR PREVENTIVE HEALTH EXAMINATION: Primary | ICD-10-CM

## 2024-10-21 DIAGNOSIS — K21.9 GASTROESOPHAGEAL REFLUX DISEASE WITHOUT ESOPHAGITIS: ICD-10-CM

## 2024-10-21 PROCEDURE — G0439 PPPS, SUBSEQ VISIT: HCPCS | Mod: 95,,, | Performed by: NURSE PRACTITIONER

## 2024-10-21 NOTE — PATIENT INSTRUCTIONS
Counseling and Referral of Other Preventative  (Italic type indicates deductible and co-insurance are waived)    Patient Name: Patsy Akins  Today's Date: 10/21/2024    Health Maintenance       Date Due Completion Date    Influenza Vaccine (1) 09/01/2024 1/21/2021    COVID-19 Vaccine (4 - 2024-25 season) 09/01/2024 2/1/2022    Cervical Cancer Screening 07/11/2029 7/11/2024    TETANUS VACCINE 06/12/2034 6/12/2024    RSV Vaccine (Age 60+ and Pregnant patients) (1 - 1-dose 75+ series) 02/12/2063 ---        No orders of the defined types were placed in this encounter.    The following information is provided to all patients.  This information is to help you find resources for any of the problems found today that may be affecting your health:                  Living healthy guide: www.[facility].[place].gov      Understanding Diabetes: www.diabetes.org      Eating healthy: www.cdc.gov/healthyweight      [facility] home safety checklist: www.cdc.gov/steadi/patient.html      Agency on Aging: www.goea.[place].[facility]      Alcoholics anonymous (AA): www.aa.org      Physical Activity: www.elbert.nih.gov/nr4bcfs      Tobacco use: www.quitwithusla.org

## 2024-10-21 NOTE — PROGRESS NOTES
"The patient location is: [place]  The chief complaint leading to consultation is: Health Risk Assessment    Visit type: audiovisual    Face to Face time with patient: 25  40 minutes of total time spent on the encounter, which includes face to face time and non-face to face time preparing to see the patient (eg, review of tests), Obtaining and/or reviewing separately obtained history, Documenting clinical information in the electronic or other health record, Independently interpreting results (not separately reported) and communicating results to the patient/family/caregiver, or Care coordination (not separately reported).         Each patient to whom he or she provides medical services by telemedicine is:  (1) informed of the relationship between the physician and patient and the respective role of any other health care provider with respect to management of the patient; and (2) notified that he or she may decline to receive medical services by telemedicine and may withdraw from such care at any time.    Notes:       Patsy Akins presented for a follow-up Medicare AWV today. The following components were reviewed and updated:    Medical history  Family History  Social history  Allergies and Current Medications  Health Risk Assessment  Health Maintenance  Care Team    **See Completed Assessments for Annual Wellness visit with in the encounter summary    The following assessments were completed:  Depression Screening  Cognitive function Screening  Timed Get Up Test  Whisper Test      Opioid documentation:      Patient does not have a current opioid prescription.          Vitals:    10/21/24 1255   Weight: 60.8 kg (134 lb)   Height: 5' 3" (1.6 m)     Body mass index is 23.74 kg/m².       Physical Exam  Constitutional:       Appearance: Normal appearance.   Neurological:      General: No focal deficit present.      Mental Status: She is alert and oriented to person, place, and time.           Diagnoses and health " risks identified today and associated recommendations/orders:  1. Encounter for preventive health examination  Assessments completed. Preventive measures, health maintenance, and immunizations reviewed with patient.    2. Bipolar 1 disorder, depressed  Stable, patient on Seroquel and Fluoxetine. Followed by Psychiatry.    3. Chronic back pain, unspecified back location, unspecified back pain laterality  Stable, patient on Gabapentin. Followed by PCP.    4. Anemia, unspecified type  Stable, patient on Ferrous Sulfate. Followed by PCP.    5. Gastroesophageal reflux disease without esophagitis  Stable, patient on Prilosec. Followed by PCP.      Provided Patsy with a 5-10 year written screening schedule and personal prevention plan. Recommendations were developed using the USPSTF age appropriate recommendations. Education, counseling, and referrals were provided as needed.  After Visit Summary printed and given to patient which includes a list of additional screenings\tests needed.    Follow up in about 1 year (around 10/21/2025) for your next annual wellness visit.      Yulisa Aldana, NP  I offered to discuss advanced care planning, including how to pick a person who would make decisions for you if you were unable to make them for yourself, called a health care power of , and what kind of decisions you might make such as use of life sustaining treatments such as ventilators and tube feeding when faced with a life limiting illness recorded on a living will that they will need to know. (How you want to be cared for as you near the end of your natural life)     X Patient is interested in learning more about how to make advanced directives.  I provided them paperwork and offered to discuss this with them.

## 2024-10-23 PROBLEM — D64.9 ANEMIA: Status: ACTIVE | Noted: 2024-10-23

## 2024-11-15 ENCOUNTER — PATIENT MESSAGE (OUTPATIENT)
Dept: SURGERY | Facility: HOSPITAL | Age: 34
End: 2024-11-15
Payer: MEDICARE

## 2024-11-26 ENCOUNTER — PATIENT MESSAGE (OUTPATIENT)
Dept: ADMINISTRATIVE | Facility: HOSPITAL | Age: 34
End: 2024-11-26
Payer: MEDICARE

## 2025-01-26 RX ORDER — OMEPRAZOLE 20 MG/1
20 CAPSULE, DELAYED RELEASE ORAL
Qty: 90 CAPSULE | Refills: 1 | Status: SHIPPED | OUTPATIENT
Start: 2025-01-26

## 2025-01-27 NOTE — TELEPHONE ENCOUNTER
No care due was identified.  Health [facility] Embedded Care Due Messages. Reference number: [id number].   1/26/2025 8:29:23 PM CST

## 2025-01-27 NOTE — TELEPHONE ENCOUNTER
Refill Authorization Note     Refill Decision Note   Patsy Akins  is requesting a refill authorization.  Brief Assessment and Rationale for Refill:  Approve     Medication Therapy Plan:  Open bicondylar fracture of left humerus, initial encounter ..., ED to Hosp-Admission (Discharged) (Obs) - no change in meds    Medication Reconciliation Completed: No   Comments:     No Care Gaps recommended.     Note composed:8:43 PM 01/26/2025

## 2025-04-30 DIAGNOSIS — G43.909 MIGRAINE WITHOUT STATUS MIGRAINOSUS, NOT INTRACTABLE, UNSPECIFIED MIGRAINE TYPE: ICD-10-CM

## 2025-04-30 RX ORDER — TOPIRAMATE 200 MG/1
200 TABLET, FILM COATED ORAL NIGHTLY
Qty: 30 TABLET | Refills: 10 | Status: SHIPPED | OUTPATIENT
Start: 2025-04-30

## 2025-04-30 NOTE — TELEPHONE ENCOUNTER
Refill Routing Note   Medication(s) are not appropriate for processing by [facility] for the following reason(s):        Outside of protocol    [facility] action(s):  Route               Appointments  past 12m or future 3m with PCP    Date Provider   Last Visit   6/12/2024 Ghanshyam Ramos MD   Next Visit   Visit date not found Ghanshyam Ramos MD   ED visits in past 90 days: 0        Note composed:11:57 AM 04/30/2025

## (undated) DEVICE — SPLINT PLASTER FS 5IN X 30IN

## (undated) DEVICE — BLANKET LOWER BODY 55.9X40.2IN

## (undated) DEVICE — Device

## (undated) DEVICE — PAD CAST SPECIALIST STRL 6

## (undated) DEVICE — SOL IRR SOD CHL .9% POUR

## (undated) DEVICE — APPLICATOR CHLORAPREP ORN 26ML

## (undated) DEVICE — DRESSING GAUZE XEROFORM 5X9

## (undated) DEVICE — ELECTRODE REM PLYHSV RETURN 9

## (undated) DEVICE — SUT 0 8-27IN VICRYL PL CT-1

## (undated) DEVICE — SPONGE COTTON TRAY 4X4IN

## (undated) DEVICE — BANDAGE ELAS SOFTWRAP ST 6X5YD

## (undated) DEVICE — SUT VICRYL PLUS 2-0 CT1 18

## (undated) DEVICE — SEE MEDLINE ITEM 157166

## (undated) DEVICE — SUT ETHILON 3/0 18IN PS-1

## (undated) DEVICE — COVER OVERHEAD SURG LT BLUE

## (undated) DEVICE — PACK ARTHROSCOPY W/ISO BAC

## (undated) DEVICE — BANDAGE ESMARK ELASTIC ST 4X9

## (undated) DEVICE — PULSAVAC ZIMMER

## (undated) DEVICE — COVER TABLE 44X90 STERILE

## (undated) DEVICE — CANISTER SUCTION RIGID 2000CC

## (undated) DEVICE — SOL NACL IRR 3000ML

## (undated) DEVICE — BIT BRILL 2.5

## (undated) DEVICE — LOOP VESSEL BLUE MAXI

## (undated) DEVICE — BIT DRILL 2.8 W/QC PERCU 200MM

## (undated) DEVICE — BANDAGE ELAS SOFTWRAP ST 4X5YD

## (undated) DEVICE — BNDG COFLEX FOAM LF2 ST 4X5YD

## (undated) DEVICE — GOWN NONREINF SET-IN SLV XL

## (undated) DEVICE — DRAPE STERI INSTRUMENT 1018

## (undated) DEVICE — DRAPE C-ARM FOR MOBILE XRAY

## (undated) DEVICE — BANDAGE MATRIX HK LOOP 4IN 5YD

## (undated) DEVICE — TOURNIQUET SB QC SP 18X4IN

## (undated) DEVICE — BIT DRILL 2.0MM D DEPTH 110MM

## (undated) DEVICE — DEV-O-LOOPS MAXI RED